# Patient Record
Sex: FEMALE | Race: WHITE | Employment: OTHER | URBAN - METROPOLITAN AREA
[De-identification: names, ages, dates, MRNs, and addresses within clinical notes are randomized per-mention and may not be internally consistent; named-entity substitution may affect disease eponyms.]

---

## 2017-01-13 ENCOUNTER — OFFICE VISIT (OUTPATIENT)
Dept: FAMILY MEDICINE | Facility: CLINIC | Age: 82
End: 2017-01-13
Payer: COMMERCIAL

## 2017-01-13 VITALS
TEMPERATURE: 97.3 F | HEART RATE: 84 BPM | BODY MASS INDEX: 27.66 KG/M2 | DIASTOLIC BLOOD PRESSURE: 46 MMHG | WEIGHT: 137 LBS | SYSTOLIC BLOOD PRESSURE: 106 MMHG

## 2017-01-13 DIAGNOSIS — N89.8 VAGINAL IRRITATION: ICD-10-CM

## 2017-01-13 DIAGNOSIS — R82.90 NONSPECIFIC FINDING ON EXAMINATION OF URINE: ICD-10-CM

## 2017-01-13 DIAGNOSIS — N30.01 ACUTE CYSTITIS WITH HEMATURIA: ICD-10-CM

## 2017-01-13 DIAGNOSIS — B37.31 CANDIDIASIS OF VAGINA: Primary | ICD-10-CM

## 2017-01-13 LAB
ALBUMIN UR-MCNC: 100 MG/DL
APPEARANCE UR: ABNORMAL
BACTERIA #/AREA URNS HPF: ABNORMAL /HPF
BILIRUB UR QL STRIP: ABNORMAL
COLOR UR AUTO: YELLOW
GLUCOSE UR STRIP-MCNC: NEGATIVE MG/DL
HGB UR QL STRIP: ABNORMAL
KETONES UR STRIP-MCNC: NEGATIVE MG/DL
LEUKOCYTE ESTERASE UR QL STRIP: ABNORMAL
MICRO REPORT STATUS: NORMAL
NITRATE UR QL: NEGATIVE
NON-SQ EPI CELLS #/AREA URNS LPF: ABNORMAL /LPF
PH UR STRIP: 5.5 PH (ref 5–7)
RBC #/AREA URNS AUTO: ABNORMAL /HPF (ref 0–2)
SP GR UR STRIP: >1.03 (ref 1–1.03)
SPECIMEN SOURCE: NORMAL
URN SPEC COLLECT METH UR: ABNORMAL
UROBILINOGEN UR STRIP-ACNC: 0.2 EU/DL (ref 0.2–1)
WBC #/AREA URNS AUTO: ABNORMAL /HPF (ref 0–2)
WET PREP SPEC: NORMAL

## 2017-01-13 PROCEDURE — 99213 OFFICE O/P EST LOW 20 MIN: CPT | Performed by: NURSE PRACTITIONER

## 2017-01-13 PROCEDURE — 81001 URINALYSIS AUTO W/SCOPE: CPT | Performed by: NURSE PRACTITIONER

## 2017-01-13 PROCEDURE — 87210 SMEAR WET MOUNT SALINE/INK: CPT | Performed by: NURSE PRACTITIONER

## 2017-01-13 PROCEDURE — 87088 URINE BACTERIA CULTURE: CPT | Performed by: NURSE PRACTITIONER

## 2017-01-13 PROCEDURE — 87086 URINE CULTURE/COLONY COUNT: CPT | Performed by: NURSE PRACTITIONER

## 2017-01-13 PROCEDURE — 87186 SC STD MICRODIL/AGAR DIL: CPT | Performed by: NURSE PRACTITIONER

## 2017-01-13 RX ORDER — NITROFURANTOIN 25; 75 MG/1; MG/1
100 CAPSULE ORAL 2 TIMES DAILY
Qty: 20 CAPSULE | Refills: 0 | Status: SHIPPED | OUTPATIENT
Start: 2017-01-13 | End: 2017-01-23

## 2017-01-13 RX ORDER — FLUCONAZOLE 150 MG/1
TABLET ORAL
Qty: 2 TABLET | Refills: 0 | Status: SHIPPED | OUTPATIENT
Start: 2017-01-13 | End: 2017-01-27

## 2017-01-13 ASSESSMENT — ENCOUNTER SYMPTOMS
NAUSEA: 0
FREQUENCY: 1
DYSURIA: 1
CHILLS: 0
SORE THROAT: 0
FLANK PAIN: 1
DIARRHEA: 0
CONSTIPATION: 0
FEVER: 0
EYE ITCHING: 0
COUGH: 0
WHEEZING: 0
SINUS PRESSURE: 0
FATIGUE: 0
EYE DISCHARGE: 0
RHINORRHEA: 0
SHORTNESS OF BREATH: 0
DIAPHORESIS: 0

## 2017-01-13 NOTE — PROGRESS NOTES
SUBJECTIVE:                                                    Kina Sears is a 89 year old female who presents to clinic today for the following health issues:      Has been having vaginal irritation for the last month. Lives in assisted living and has been putting some nystatin cream on the area and it is not getting better. Is incont of urine and changes pads at least once per day. Is having some lower back pain-more than usual. Has noticed that is going to the bathroom more frequently than usual. No chills or sweats.     Vaginal Symptoms      Duration: over one month    Description  itching, burning, odor and pelvic pain    Intensity:  severe    Accompanying signs and symptoms (fever/dysuria/abdominal or back pain): Pain, burning, difficult to sit down, urine is dark yellow    History  Sexually active: not at present  Possibility of pregnancy: No  Recent antibiotic use: no     Precipitating or alleviating factors: None    Therapies tried and outcome: Nystatin cream, drinking water  Outcome: not effective           Problem list and histories reviewed & adjusted, as indicated.  Additional history: as documented    Current Outpatient Prescriptions   Medication Sig Dispense Refill     Cholecalciferol (VITAMIN D PO)        fluconazole (DIFLUCAN) 150 MG tablet Take 1 tab by mouth today and then repeat in 4 days. 2 tablet 0     nitrofurantoin, macrocrystal-monohydrate, (MACROBID) 100 MG capsule Take 1 capsule (100 mg) by mouth 2 times daily for 10 days 20 capsule 0     lisinopril (PRINIVIL,ZESTRIL) 20 MG tablet Take 1 tablet (20 mg) by mouth daily 90 tablet 0     labetalol (NORMODYNE) 100 MG tablet TAKE ONE AND ONE-HALF TABLETS BY MOUTH TWICE DAILY 270 tablet 0     fluticasone (FLONASE) 50 MCG/ACT nasal spray Spray 2 sprays into both nostrils daily As needed for nasal congestion. 16 g 3     diclofenac (VOLTAREN) 1 % GEL Apply 4 grams to knees up to four times daily using enclosed dosing card as needed for pain 100  g 1     acetaminophen (TYLENOL) 325 MG tablet Take 650mg orally every morning and evening.  Make 650mg additionally as needed for pain every 4 hours, do no exceed 3000mg in 24 hour period 100 tablet 11     omeprazole (PRILOSEC) 20 MG capsule Take 1 capsule (20 mg) by mouth daily 90 capsule 1     order for DME Equipment being ordered: 4 wheeled walker 1 Device 0     acetaminophen 650 MG TABS Take 650 mg by mouth every 4 hours as needed. 250 tablet      clotrimazole (LOTRIMIN) 1 % cream Apply topically 2 times daily As needed under arm and under breasts 15 g 1     nitroglycerin (NITROSTAT) 0.4 MG SL tablet Place 1 tablet (0.4 mg) under the tongue See Admin Instructions EVERY 5 MIN AS NEEDED, UP TO 3 PER EPISODE 90 tablet 0     simvastatin (ZOCOR) 20 MG tablet Take 1 tablet (20 mg) by mouth At Bedtime 90 tablet 1     amLODIPine (NORVASC) 5 MG tablet TAKE ONE TABLET BY MOUTH TWICE A  tablet 1     Allergies   Allergen Reactions     Sulfa Drugs Rash       ROS:  Review of Systems   Constitutional: Negative for fever, chills, diaphoresis and fatigue.   HENT: Negative for ear discharge, ear pain, hearing loss, rhinorrhea, sinus pressure and sore throat.    Eyes: Negative for discharge and itching.   Respiratory: Negative for cough, shortness of breath and wheezing.    Gastrointestinal: Negative for nausea, diarrhea and constipation.   Genitourinary: Positive for dysuria, urgency, frequency, flank pain and enuresis.        Vaginal itching, hurts to sit.     Vaginal odor    Hurts to sit down in vaginal area                 OBJECTIVE:                                                    /46 mmHg  Pulse 84  Temp(Src) 97.3  F (36.3  C) (Tympanic)  Ht   Wt 137 lb (62.143 kg)  Breastfeeding? No  Body mass index is 27.66 kg/(m^2).  Physical Exam   Constitutional: She appears well-developed.   HENT:   Right Ear: Tympanic membrane and external ear normal.   Left Ear: Tympanic membrane and external ear normal.   Nose:  No mucosal edema or rhinorrhea.   Cardiovascular: Normal rate, regular rhythm and normal heart sounds.    Pulmonary/Chest: Effort normal and breath sounds normal.   Abdominal: Soft. Bowel sounds are normal.   Genitourinary:         Neurological: She is alert.   Skin: Skin is warm and dry.   Psychiatric: She has a normal mood and affect.         Diagnostic Test Results:  See Results from today     ASSESSMENT/PLAN:                                                        1. Vaginal irritation  Enc to change incont pads every other time that goes to the bathroom  Use a barrier cream like A&D or Desitin   - UA reflex to Microscopic and Culture  - Wet prep    2. Nonspecific finding on examination of urine  - Urine Culture Aerobic Bacterial    3. Candidiasis of vagina  Educated on use of med  May stop using nystatin cream   - Urine Microscopic  - fluconazole (DIFLUCAN) 150 MG tablet; Take 1 tab by mouth today and then repeat in 4 days.  Dispense: 2 tablet; Refill: 0    4. Acute cystitis with hematuria  Patient counseled regarding prevention of UTI's  Patient instructed to return for fever, vomiting, rash, flank/back pain or if symptoms not resolved in 2 days  Will send urine for C&S    - nitrofurantoin, macrocrystal-monohydrate, (MACROBID) 100 MG capsule; Take 1 capsule (100 mg) by mouth 2 times daily for 10 days  Dispense: 20 capsule; Refill: 0      ROBBIE Dvais [facility]

## 2017-01-13 NOTE — MR AVS SNAPSHOT
After Visit Summary   1/13/2017    Kina Sears    MRN: [id number]           Patient Information     Date Of Birth          6/9/1923        Visit Information        Provider Department      1/13/2017 10:40 AM Margaret Clement APRN [facility]        Today's Diagnoses     Candidiasis of vagina    -  1     Vaginal irritation         Nonspecific finding on examination of urine         Acute cystitis with hematuria           Care Instructions      Vaginal Infection: Yeast (Candidiasis)  Yeast infection occurs when yeast in the vagina increase and start attacking the vaginal tissues. Yeast is a type of fungus. These infections are often caused by a type of yeast called Candida albicans. Other species of yeast can also cause infections. Factors that may make infection more likely include recent antibiotic use, douching, or increased sex. Yeast infections are more common in women who have diabetes, or are obese or pregnant, or have a weak immune system.  Symptoms of yeast infection    Clumpy or thin, white discharge, which may look like cottage cheese    No odor or minimal odor    Severe vaginal itching or burning    Burning with urination    Swelling, redness of vulva    Pain during sex  Treating yeast infection  Yeast infection is treated with a vaginal antifungal cream. In some cases, antifungal pills are prescribed instead. During treatment:    Finish all of your medicine, even if your symptoms go away.    Apply the cream before going to bed. Lie flat after applying so that it doesn't drip out.    Do not douche or use tampons.    Don't rely on a diaphragm or condoms, since the cream may weaken them.    Avoid intercourse if advised by your healthcare provider.     Should I treat a yeast infection myself?  Discuss with your healthcare provider whether you should use over-the-counter medicines to treat a yeast infection. Self-treatment may depend on whether:    You've had a yeast  "infection in the past.    You're at risk for STDs.  Call your healthcare provider if symptoms do not go away or come back after treatment.     6252-8788 [employer]. [address] [place] [address], [place] [address]. All rights reserved. This information is not intended as a substitute for professional medical care. Always follow your healthcare professional's instructions.              Follow-ups after your visit        Who to contact     Normal or non-critical lab and imaging results will be communicated to you by [employer], letter or phone within 4 business days after the clinic has received the results. If you do not hear from us within 7 days, please contact the clinic through [employer] or phone. If you have a critical or abnormal lab result, we will notify you by phone as soon as possible.  Submit refill requests through [employer] or call your pharmacy and they will forward the refill request to us. Please allow 3 business days for your refill to be completed.          If you need to speak with a  for additional information , please call: [phone]           Additional Information About Your Visit        [employer] Information     [employer] lets you send messages to your doctor, view your test results, renew your prescriptions, schedule appointments and more. To sign up, go to [email or web address] . Click on \"Log in\" on the left side of the screen, which will take you to the Welcome page. Then click on \"Sign up Now\" on the right side of the page.     You will be asked to enter the access code listed below, as well as some personal information. Please follow the directions to create your username and password.     Your access code is: [email or web address]  Expires: 2017 11:21 AM     Your access code will  in 90 days. If you need help or a new code, please call your [place] clinic or [phone].        Care EveryWhere ID     This is your Care EveryWhere ID. This could be used by other " organizations to access your [facility] medical records  [id number]        Your Vitals Were     Pulse Temperature Breastfeeding?             84 97.3  F (36.3  C) (Tympanic) No          Blood Pressure from Last 3 Encounters:   01/13/17 106/46   03/22/16 125/65   02/26/16 132/69    Weight from Last 3 Encounters:   01/13/17 137 lb (62.143 kg)   03/22/16 131 lb (59.421 kg)   02/26/16 125 lb (56.7 kg)              We Performed the Following     UA reflex to Microscopic and Culture     Urine Culture Aerobic Bacterial     Urine Microscopic     Wet prep          Today's Medication Changes          These changes are accurate as of: 1/13/17 11:21 AM.  If you have any questions, ask your nurse or doctor.               Start taking these medicines.        Dose/Directions    fluconazole 150 MG tablet   Commonly known as:  DIFLUCAN   Used for:  Candidiasis of vagina   Started by:  Margaret Clement APRN CNP        Take 1 tab by mouth today and then repeat in 4 days.   Quantity:  2 tablet   Refills:  0       nitrofurantoin (macrocrystal-monohydrate) 100 MG capsule   Commonly known as:  MACROBID   Used for:  Acute cystitis with hematuria   Started by:  Margaret Clement APRN CNP        Dose:  100 mg   Take 1 capsule (100 mg) by mouth 2 times daily for 10 days   Quantity:  20 capsule   Refills:  0            Where to get your medicines      These medications [place] [facility] [place] [id number]  [address] [place] [id number]     Phone:  [phone]    - fluconazole 150 MG tablet  - nitrofurantoin (macrocrystal-monohydrate) 100 MG capsule             Primary Care Provider Office Phone # Fax #    Melisa Butcher [phone]       [address] [place] [facility] [address]        Thank you!     Thank you for choosing [facility]  for your care. Our goal is always to provide you with excellent care. Hearing back from our  patients is one way we can continue to improve our services. Please take a few minutes to complete the written survey that you may receive in the mail after your visit with us. Thank you!             Your Updated Medication List - Protect others around you: Learn how to safely use, store and throw away your medicines at www.disposemymeds.org.          This list is accurate as of: 1/13/17 11:21 AM.  Always use your most recent med list.                   Brand Name Dispense Instructions for use    * ACETAMINOPHEN 8 HOUR 650 MG 8 hour tablet   Generic drug:  acetaminophen     250 tablet    Take 650 mg by mouth every 4 hours as needed.       * acetaminophen 325 MG tablet    TYLENOL    100 tablet    Take 650mg orally every morning and evening.  Make 650mg additionally as needed for pain every 4 hours, do no exceed 3000mg in 24 hour period       amLODIPine 5 MG tablet    NORVASC    180 tablet    TAKE ONE TABLET BY MOUTH TWICE A DAY       clotrimazole 1 % cream    LOTRIMIN    15 g    Apply topically 2 times daily As needed under arm and under breasts       diclofenac 1 % Gel topical gel    VOLTAREN    100 g    Apply 4 grams to knees up to four times daily using enclosed dosing card as needed for pain       fluconazole 150 MG tablet    DIFLUCAN    2 tablet    Take 1 tab by mouth today and then repeat in 4 days.       fluticasone 50 MCG/ACT spray    FLONASE    16 g    Spray 2 sprays into both nostrils daily As needed for nasal congestion.       labetalol 100 MG tablet    NORMODYNE    270 tablet    TAKE ONE AND ONE-HALF TABLETS BY MOUTH TWICE DAILY       lisinopril 20 MG tablet    PRINIVIL/ZESTRIL    90 tablet    Take 1 tablet (20 mg) by mouth daily       nitrofurantoin (macrocrystal-monohydrate) 100 MG capsule    MACROBID    20 capsule    Take 1 capsule (100 mg) by mouth 2 times daily for 10 days       nitroglycerin 0.4 MG sublingual tablet    NITROSTAT    90 tablet    Place 1 tablet (0.4 mg) under the tongue See Admin  Instructions EVERY 5 MIN AS NEEDED, UP TO 3 PER EPISODE       omeprazole 20 MG CR capsule    priLOSEC    90 capsule    Take 1 capsule (20 mg) by mouth daily       order for DME     1 Device    Equipment being ordered: 4 wheeled walker       simvastatin 20 MG tablet    ZOCOR    90 tablet    Take 1 tablet (20 mg) by mouth At Bedtime       VITAMIN D PO          * Notice:  This list has 2 medication(s) that are the same as other medications prescribed for you. Read the directions carefully, and ask your doctor or other care provider to review them with you.

## 2017-01-13 NOTE — PATIENT INSTRUCTIONS
Vaginal Infection: Yeast (Candidiasis)  Yeast infection occurs when yeast in the vagina increase and start attacking the vaginal tissues. Yeast is a type of fungus. These infections are often caused by a type of yeast called Candida albicans. Other species of yeast can also cause infections. Factors that may make infection more likely include recent antibiotic use, douching, or increased sex. Yeast infections are more common in women who have diabetes, or are obese or pregnant, or have a weak immune system.  Symptoms of yeast infection    Clumpy or thin, white discharge, which may look like cottage cheese    No odor or minimal odor    Severe vaginal itching or burning    Burning with urination    Swelling, redness of vulva    Pain during sex  Treating yeast infection  Yeast infection is treated with a vaginal antifungal cream. In some cases, antifungal pills are prescribed instead. During treatment:    Finish all of your medicine, even if your symptoms go away.    Apply the cream before going to bed. Lie flat after applying so that it doesn't drip out.    Do not douche or use tampons.    Don't rely on a diaphragm or condoms, since the cream may weaken them.    Avoid intercourse if advised by your healthcare provider.     Should I treat a yeast infection myself?  Discuss with your healthcare provider whether you should use over-the-counter medicines to treat a yeast infection. Self-treatment may depend on whether:    You've had a yeast infection in the past.    You're at risk for STDs.  Call your healthcare provider if symptoms do not go away or come back after treatment.     1746-5594 [employer]. [address] [place] [address], [place] [address]. All rights reserved. This information is not intended as a substitute for professional medical care. Always follow your healthcare professional's instructions.

## 2017-01-13 NOTE — NURSING NOTE
"Chief Complaint   Patient presents with     Vaginal Problem       Initial /46 mmHg  Pulse 84  Temp(Src) 97.3  F (36.3  C) (Tympanic)  Ht   Wt 137 lb (62.143 kg)  Breastfeeding? No Estimated body mass index is 27.66 kg/(m^2) as calculated from the following:    Height as of 2/11/16: 4' 11\" (1.499 m).    Weight as of this encounter: 137 lb (62.143 kg).  BP completed using cuff size: regular    April SONU Holder      "

## 2017-01-16 LAB
BACTERIA SPEC CULT: ABNORMAL
MICRO REPORT STATUS: ABNORMAL
MICROORGANISM SPEC CULT: ABNORMAL
MICROORGANISM SPEC CULT: ABNORMAL
SPECIMEN SOURCE: ABNORMAL

## 2017-01-16 NOTE — PROGRESS NOTES
Quick Note:    Your urine culture is positive. You are on the right antibiotic. Make sure to take the full course of the antibiotic. Please return 3 days after your antibiotic is finished to resubmit a urine sample to make sure that infection is gone.  ______

## 2017-01-18 ENCOUNTER — TELEPHONE (OUTPATIENT)
Dept: FAMILY MEDICINE | Facility: CLINIC | Age: 82
End: 2017-01-18

## 2017-01-18 DIAGNOSIS — N39.0 UTI (URINARY TRACT INFECTION): Primary | ICD-10-CM

## 2017-01-23 ENCOUNTER — TELEPHONE (OUTPATIENT)
Dept: FAMILY MEDICINE | Facility: CLINIC | Age: 82
End: 2017-01-23

## 2017-01-23 ENCOUNTER — COMMUNICATION - HEALTHEAST (OUTPATIENT)
Dept: ADMINISTRATIVE | Facility: CLINIC | Age: 82
End: 2017-01-23

## 2017-01-23 NOTE — TELEPHONE ENCOUNTER
Patient's son called back. He is leaving town as soon as he hears back from us. He wants to know if patient should still be taking the macrobid and what time of day should she be taking her metoprolol.  She is currently taking:  Metoprolol 25 mg daily  Lasix 20 mg BID  K 10 mEq daily  Vit D 2000 iu daily    She is not taking simvastatin or amlodipine.  Please review current medications and address whether she should still be taking the macrobid and when she should be taking the metoprolol. Provider's CMA notified. Thank you.  Kathi Montenegro RN

## 2017-01-23 NOTE — TELEPHONE ENCOUNTER
Patient son called and would like to talk to the nurse of Dr. Butcher about some medications that his mom was prescribed when she was in with [facility].  Son says its important to call right away.    Mila Ott [facility]

## 2017-01-23 NOTE — TELEPHONE ENCOUNTER
Advised to finish the macrobid, take until gone.     Follow up this Friday to address metoprolol and lasix.

## 2017-01-23 NOTE — TELEPHONE ENCOUNTER
Son reports that patient was seen on 1/13/17 for UTI. She was treated with macrobid 100 mg BID for 10 days. She took medication on Fri, Sat, Sun, and Mon.   Patient was admitted to [facility] for difficulty with breathing due to another issue separate from UTI. She did not receive her macrobid on Tues, Wed, Thurs, or Friday even though son had told staff about the macrobid. When patient was discharged on Saturday, patient's son started the macrobid again. She received dosing on Sat, Sun, and today. Son reports that patient is feeling much better, no UTI symptoms now. Son is wondering if she should continue with the macrobid or not.  Patient was also taken off of labetolol and prescribed metoprolol when she was in the hospital. He did not remember the dosing. He is going to her assisted living facility and will confirm what medications and dosing she is on.  Please advise in Dr. Butcher's absence recommendations about her macrobid. Thank you.  Kathi Montenegro RN

## 2017-01-27 ENCOUNTER — OFFICE VISIT (OUTPATIENT)
Dept: FAMILY MEDICINE | Facility: CLINIC | Age: 82
End: 2017-01-27
Payer: COMMERCIAL

## 2017-01-27 VITALS
HEART RATE: 92 BPM | SYSTOLIC BLOOD PRESSURE: 112 MMHG | OXYGEN SATURATION: 97 % | DIASTOLIC BLOOD PRESSURE: 60 MMHG | TEMPERATURE: 97.7 F

## 2017-01-27 DIAGNOSIS — N30.01 ACUTE CYSTITIS WITH HEMATURIA: ICD-10-CM

## 2017-01-27 DIAGNOSIS — R82.90 NONSPECIFIC FINDING ON EXAMINATION OF URINE: ICD-10-CM

## 2017-01-27 DIAGNOSIS — B37.9 CANDIDA INFECTION: ICD-10-CM

## 2017-01-27 DIAGNOSIS — R30.0 DYSURIA: Primary | ICD-10-CM

## 2017-01-27 DIAGNOSIS — I50.43 ACUTE ON CHRONIC COMBINED SYSTOLIC AND DIASTOLIC CONGESTIVE HEART FAILURE (H): ICD-10-CM

## 2017-01-27 LAB
ALBUMIN SERPL-MCNC: 3.6 G/DL (ref 3.4–5)
ALBUMIN UR-MCNC: NEGATIVE MG/DL
ALP SERPL-CCNC: 67 U/L (ref 40–150)
ALT SERPL W P-5'-P-CCNC: 21 U/L (ref 0–50)
ANION GAP SERPL CALCULATED.3IONS-SCNC: 9 MMOL/L (ref 3–14)
APPEARANCE UR: ABNORMAL
AST SERPL W P-5'-P-CCNC: 19 U/L (ref 0–45)
BACTERIA #/AREA URNS HPF: ABNORMAL /HPF
BASOPHILS # BLD AUTO: 0.1 10E9/L (ref 0–0.2)
BASOPHILS NFR BLD AUTO: 1 %
BILIRUB SERPL-MCNC: 0.3 MG/DL (ref 0.2–1.3)
BILIRUB UR QL STRIP: NEGATIVE
BUN SERPL-MCNC: 37 MG/DL (ref 7–30)
CALCIUM SERPL-MCNC: 9 MG/DL (ref 8.5–10.1)
CHLORIDE SERPL-SCNC: 107 MMOL/L (ref 94–109)
CO2 SERPL-SCNC: 24 MMOL/L (ref 20–32)
COLOR UR AUTO: YELLOW
CREAT SERPL-MCNC: 1.33 MG/DL (ref 0.52–1.04)
DIFFERENTIAL METHOD BLD: ABNORMAL
EOSINOPHIL # BLD AUTO: 0.5 10E9/L (ref 0–0.7)
EOSINOPHIL NFR BLD AUTO: 7 %
ERYTHROCYTE [DISTWIDTH] IN BLOOD BY AUTOMATED COUNT: 22.4 % (ref 10–15)
GFR SERPL CREATININE-BSD FRML MDRD: 37 ML/MIN/1.7M2
GLUCOSE SERPL-MCNC: 123 MG/DL (ref 70–99)
GLUCOSE UR STRIP-MCNC: NEGATIVE MG/DL
HCT VFR BLD AUTO: 33.6 % (ref 35–47)
HGB BLD-MCNC: 9.9 G/DL (ref 11.7–15.7)
HGB UR QL STRIP: ABNORMAL
HYALINE CASTS #/AREA URNS LPF: ABNORMAL /LPF (ref 0–2)
KETONES UR STRIP-MCNC: NEGATIVE MG/DL
LEUKOCYTE ESTERASE UR QL STRIP: ABNORMAL
LYMPHOCYTES # BLD AUTO: 1.4 10E9/L (ref 0.8–5.3)
LYMPHOCYTES NFR BLD AUTO: 19.3 %
MCH RBC QN AUTO: 23.1 PG (ref 26.5–33)
MCHC RBC AUTO-ENTMCNC: 29.5 G/DL (ref 31.5–36.5)
MCV RBC AUTO: 79 FL (ref 78–100)
MONOCYTES # BLD AUTO: 0.8 10E9/L (ref 0–1.3)
MONOCYTES NFR BLD AUTO: 10.5 %
NEUTROPHILS # BLD AUTO: 4.6 10E9/L (ref 1.6–8.3)
NEUTROPHILS NFR BLD AUTO: 62.2 %
NITRATE UR QL: NEGATIVE
NON-SQ EPI CELLS #/AREA URNS LPF: ABNORMAL /LPF
NT-PROBNP SERPL-MCNC: 1909 PG/ML (ref 0–450)
PH UR STRIP: 5 PH (ref 5–7)
PLATELET # BLD AUTO: 338 10E9/L (ref 150–450)
POTASSIUM SERPL-SCNC: 5.5 MMOL/L (ref 3.4–5.3)
PROT SERPL-MCNC: 7 G/DL (ref 6.8–8.8)
RBC # BLD AUTO: 4.28 10E12/L (ref 3.8–5.2)
RBC #/AREA URNS AUTO: ABNORMAL /HPF (ref 0–2)
SODIUM SERPL-SCNC: 140 MMOL/L (ref 133–144)
SP GR UR STRIP: 1.01 (ref 1–1.03)
URN SPEC COLLECT METH UR: ABNORMAL
UROBILINOGEN UR STRIP-ACNC: 0.2 EU/DL (ref 0.2–1)
WBC # BLD AUTO: 7.3 10E9/L (ref 4–11)
WBC #/AREA URNS AUTO: ABNORMAL /HPF (ref 0–2)

## 2017-01-27 PROCEDURE — 85025 COMPLETE CBC W/AUTO DIFF WBC: CPT | Performed by: NURSE PRACTITIONER

## 2017-01-27 PROCEDURE — 99214 OFFICE O/P EST MOD 30 MIN: CPT | Performed by: NURSE PRACTITIONER

## 2017-01-27 PROCEDURE — 87086 URINE CULTURE/COLONY COUNT: CPT | Performed by: NURSE PRACTITIONER

## 2017-01-27 PROCEDURE — 36415 COLL VENOUS BLD VENIPUNCTURE: CPT | Performed by: NURSE PRACTITIONER

## 2017-01-27 PROCEDURE — 83880 ASSAY OF NATRIURETIC PEPTIDE: CPT | Performed by: NURSE PRACTITIONER

## 2017-01-27 PROCEDURE — 81001 URINALYSIS AUTO W/SCOPE: CPT | Performed by: NURSE PRACTITIONER

## 2017-01-27 PROCEDURE — 80053 COMPREHEN METABOLIC PANEL: CPT | Performed by: NURSE PRACTITIONER

## 2017-01-27 PROCEDURE — 87088 URINE BACTERIA CULTURE: CPT | Performed by: NURSE PRACTITIONER

## 2017-01-27 PROCEDURE — 87186 SC STD MICRODIL/AGAR DIL: CPT | Performed by: NURSE PRACTITIONER

## 2017-01-27 RX ORDER — MULTIVIT-MIN/IRON/FOLIC ACID/K 18-600-40
CAPSULE ORAL
COMMUNITY
End: 2018-01-01

## 2017-01-27 RX ORDER — CIPROFLOXACIN 500 MG/1
500 TABLET, FILM COATED ORAL 2 TIMES DAILY
Qty: 14 TABLET | Refills: 0 | Status: SHIPPED | OUTPATIENT
Start: 2017-01-27 | End: 2018-01-01

## 2017-01-27 RX ORDER — FUROSEMIDE 40 MG
40 TABLET ORAL DAILY
Qty: 90 TABLET | Refills: 3 | Status: SHIPPED | OUTPATIENT
Start: 2017-01-27 | End: 2018-01-01

## 2017-01-27 RX ORDER — NYSTATIN 100000 U/G
CREAM TOPICAL 2 TIMES DAILY
COMMUNITY
End: 2018-01-01

## 2017-01-27 RX ORDER — METOPROLOL SUCCINATE 25 MG/1
25 TABLET, EXTENDED RELEASE ORAL DAILY
Qty: 90 TABLET | Refills: 3 | Status: SHIPPED | OUTPATIENT
Start: 2017-01-27 | End: 2018-01-01

## 2017-01-27 ASSESSMENT — ENCOUNTER SYMPTOMS
ABDOMINAL PAIN: 0
NUMBNESS: 0
VOMITING: 0
DYSURIA: 1
SORE THROAT: 0
DIARRHEA: 1
COUGH: 0
NAUSEA: 0
CHEST TIGHTNESS: 0
DIZZINESS: 0
LIGHT-HEADEDNESS: 0
FATIGUE: 0
FREQUENCY: 1
POLYDIPSIA: 0
ABDOMINAL DISTENTION: 0
SHORTNESS OF BREATH: 0
RHINORRHEA: 0
HEADACHES: 0
CONSTIPATION: 0
ARTHRALGIAS: 0
PALPITATIONS: 0
POLYPHAGIA: 0
WHEEZING: 0

## 2017-01-27 NOTE — Clinical Note
[address]  [address]  [phone]      January 30, 2017      Kina Sears  [address]  [address]  [facility] [address]              Dear Ms. Sears,    Your urine culture is positive. You are on the right antibiotic. Make sure to take the full course of the antibiotic. Please return 3 days after your antibiotic is finished to resubmit a urine sample to make sure that infection is gone. Hope the diarrhea is better!    Please call or e-mail with any additional questions or concerns.       Sincerely,    Margaret Clement, ROBBIE-CNP

## 2017-01-27 NOTE — NURSING NOTE
"Chief Complaint   Patient presents with     Urinary Problem       Initial /60 mmHg  Pulse 92  Temp(Src) 97.7  F (36.5  C) (Tympanic)  SpO2 97% Estimated body mass index is 27.66 kg/(m^2) as calculated from the following:    Height as of 2/11/16: 4' 11\" (1.499 m).    Weight as of 1/13/17: 137 lb (62.143 kg).  BP completed using cuff size: regular    April SONU Holder      "

## 2017-01-27 NOTE — PATIENT INSTRUCTIONS
"   * BLADDER INFECTION,Female (Adult)    A bladder infection (\"cystitis\" or \"UTI\") usually causes a constant urge to urinate and a burning when passing urine. Urine may be cloudy, smelly or dark. There may be pain in the lower abdomen. A bladder infection occurs when bacteria from the vaginal area enter the bladder opening (urethra). This can occur from sexual intercourse, wearing tight clothing, dehydration and other factors.  HOME CARE:  1. Drink lots of fluids (at least 6-8 glasses a day, unless you must restrict fluids for other medical reasons). This will force the medicine into your urinary system and flush the bacteria out of your body. Cranberry juice has been shown to help clear out the bacteria.  2. Avoid sexual intercourse until your symptoms are gone.  3. A bladder infection is treated with antibiotics. You may also be given Pyridium (generic = phenazopyridine) to reduce the burning sensation. This medicine will cause your urine to become a bright orange color. The orange urine may stain clothing. You may wear a pad or panty-liner to protect clothing.  PREVENTING FUTURE INFECTIONS:  1. Always wipe from front to back after a bowel movement.  2. Keep the genital area clean and dry.  3. Drink plenty of fluids each day to avoid dehydration.  4. Urinate right after intercourse to flush out the bladder.  5. Wear cotton underwear and cotton-lined panty hose; avoid tight-fitting pants.  6. If you are on birth control pills and are having frequent bladder infections, discuss with your doctor.  FOLLOW UP: Return to this facility or see your doctor if ALL symptoms are not gone after three days of treatment.  GET PROMPT MEDICAL ATTENTION if any of the following occur:    Fever over 101 F (38.3 C)    No improvement by the third day of treatment    Increasing back or abdominal pain    Repeated vomiting; unable to keep medicine down    Weakness, dizziness or fainting    Vaginal discharge    Pain, redness or swelling in " the labia (outer vaginal area)    4959-5494 [employer], [address] [place] [address], [place] [address]. All rights reserved. This information is not intended as a substitute for professional medical care. Always follow your healthcare professional's instructions.

## 2017-01-27 NOTE — MR AVS SNAPSHOT
"              After Visit Summary   1/27/2017    Kina Sears    MRN: [id number]           Patient Information     Date Of Birth          6/9/1923        Visit Information        Provider Department      1/27/2017 10:40 AM Margaret Clement APRN [facility]        Today's Diagnoses     Dysuria    -  1     Nonspecific finding on examination of urine         Acute on chronic combined systolic and diastolic congestive heart failure (H)         Acute cystitis with hematuria         Candida infection           Care Instructions       * BLADDER INFECTION,Female (Adult)    A bladder infection (\"cystitis\" or \"UTI\") usually causes a constant urge to urinate and a burning when passing urine. Urine may be cloudy, smelly or dark. There may be pain in the lower abdomen. A bladder infection occurs when bacteria from the vaginal area enter the bladder opening (urethra). This can occur from sexual intercourse, wearing tight clothing, dehydration and other factors.  HOME CARE:  1. Drink lots of fluids (at least 6-8 glasses a day, unless you must restrict fluids for other medical reasons). This will force the medicine into your urinary system and flush the bacteria out of your body. Cranberry juice has been shown to help clear out the bacteria.  2. Avoid sexual intercourse until your symptoms are gone.  3. A bladder infection is treated with antibiotics. You may also be given Pyridium (generic = phenazopyridine) to reduce the burning sensation. This medicine will cause your urine to become a bright orange color. The orange urine may stain clothing. You may wear a pad or panty-liner to protect clothing.  PREVENTING FUTURE INFECTIONS:  1. Always wipe from front to back after a bowel movement.  2. Keep the genital area clean and dry.  3. Drink plenty of fluids each day to avoid dehydration.  4. Urinate right after intercourse to flush out the bladder.  5. Wear cotton underwear and cotton-lined panty hose; " "avoid tight-fitting pants.  6. If you are on birth control pills and are having frequent bladder infections, discuss with your doctor.  FOLLOW UP: Return to this facility or see your doctor if ALL symptoms are not gone after three days of treatment.  GET PROMPT MEDICAL ATTENTION if any of the following occur:    Fever over 101 F (38.3 C)    No improvement by the third day of treatment    Increasing back or abdominal pain    Repeated vomiting; unable to keep medicine down    Weakness, dizziness or fainting    Vaginal discharge    Pain, redness or swelling in the labia (outer vaginal area)    5762-9374 [employer], [address] [place] [address]. All rights reserved. This information is not intended as a substitute for professional medical care. Always follow your healthcare professional's instructions.          Follow-ups after your visit        Who to contact     Normal or non-critical lab and imaging results will be communicated to you by [email or web address], letter or phone within 4 business days after the clinic has received the results. If you do not hear from us within 7 days, please contact the clinic through [email or web address] or phone. If you have a critical or abnormal lab result, we will notify you by phone as soon as possible.  Submit refill requests through Our[employer] or call your pharmacy and they will forward the refill request to us. Please allow 3 business days for your refill to be completed.          If you need to speak with a  for additional information , please call: [phone]           Additional Information About Your Visit        OurHealthMate Information     Our[employer]ate lets you send messages to your doctor, view your test results, renew your prescriptions, schedule appointments and more. To sign up, go to [email or web address] . Click on \"Log in\" on the left side of the screen, which will take you to the Welcome page. Then click on \"Sign up Now\" on the right side of the page.     You " will be asked to enter the access code listed below, as well as some personal information. Please follow the directions to create your username and password.     Your access code is: [email or web address]  Expires: 2017 11:21 AM     Your access code will  in 90 days. If you need help or a new code, please call your [facility] clinic or [phone].        Care EveryWhere ID     This is your Care EveryWhere ID. This could be used by other organizations to access your [facility] medical records  [id number]        Your Vitals Were     Pulse Temperature Pulse Oximetry             92 97.7  F (36.5  C) (Tympanic) 97%          Blood Pressure from Last 3 Encounters:   17 84/40   17 106/46   16 125/65    Weight from Last 3 Encounters:   17 137 lb (62.143 kg)   16 131 lb (59.421 kg)   16 125 lb (56.7 kg)              We Performed the Following     CBC with platelets differential     Comprehensive metabolic panel     N terminal pro BNP outpatient     UA reflex to Microscopic and Culture     Urine Culture Aerobic Bacterial     Urine Microscopic          Today's Medication Changes          These changes are accurate as of: 17 11:38 AM.  If you have any questions, ask your nurse or doctor.               Start taking these medicines.        Dose/Directions    ciprofloxacin 500 MG tablet   Commonly known as:  CIPRO   Used for:  Acute cystitis with hematuria   Started by:  Margaret Clement APRN CNP        Dose:  500 mg   Take 1 tablet (500 mg) by mouth 2 times daily   Quantity:  14 tablet   Refills:  0       dimethicone-zinc oxide cream   Used for:  Candida infection   Started by:  Margaret Clement APRN CNP        Apply three per day until area is cleared   Quantity:  142 g   Refills:  1         These medicines have changed or have updated prescriptions.        Dose/Directions    acetaminophen 325 MG tablet   Commonly known as:  TYLENOL   This may have changed:  Another  medication with the same name was removed. Continue taking this medication, and follow the directions you see here.   Used for:  Primary osteoarthritis of right knee   Changed by:  Melisa Butcher, DO        Take 650mg orally every morning and evening.  Make 650mg additionally as needed for pain every 4 hours, do no exceed 3000mg in 24 hour period   Quantity:  100 tablet   Refills:  11       furosemide 40 MG tablet   Commonly known as:  LASIX   This may have changed:    - medication strength  - how much to take  - when to take this   Used for:  Acute on chronic combined systolic and diastolic congestive heart failure (H)   Changed by:  Margaret Clement APRN CNP        Dose:  40 mg   Take 1 tablet (40 mg) by mouth daily   Quantity:  90 tablet   Refills:  3       metoprolol 25 MG 24 hr tablet   Commonly known as:  TOPROL-XL   This may have changed:    - medication strength  - when to take this   Used for:  Acute on chronic combined systolic and diastolic congestive heart failure (H)   Changed by:  Margaret Clement APRN CNP        Dose:  25 mg   Take 1 tablet (25 mg) by mouth daily   Quantity:  90 tablet   Refills:  3       vitamin D 2000 UNITS Caps   This may have changed:  Another medication with the same name was removed. Continue taking this medication, and follow the directions you see here.   Changed by:  Margaret Clement APRN CNP        Refills:  0         Stop taking these medicines if you haven't already. Please contact your care team if you have questions.     amLODIPine 5 MG tablet   Commonly known as:  NORVASC   Stopped by:  Margaret Clement APRN CNP           labetalol 100 MG tablet   Commonly known as:  NORMODYNE   Stopped by:  Margaret Clement APRN CNP                Where to get your medicines      These medications were sent to [facility] [address] [facility]  [address] [facility], [facility] [address]     Phone:  [phone] -  ciprofloxacin 500 MG tablet  - dimethicone-zinc oxide cream      These medications were sent to [facility] - [place], [place] - [address] [facility]  [address] [facility] [place], [place] [address]     Phone:  [phone]    - furosemide 40 MG tablet  - metoprolol 25 MG 24 hr tablet             Primary Care Provider Office Phone # Fax #    Melisa Butcher [phone]       [facility] [address] [place] [address] [address] [place] [address]        Thank you!     Thank you for choosing [facility]  for your care. Our goal is always to provide you with excellent care. Hearing back from our patients is one way we can continue to improve our services. Please take a few minutes to complete the written survey that you may receive in the mail after your visit with us. Thank you!             Your Updated Medication List - Protect others around you: Learn how to safely use, store and throw away your medicines at www.disposemymeds.org.          This list is accurate as of: 1/27/17 11:38 AM.  Always use your most recent med list.                   Brand Name Dispense Instructions for use    acetaminophen 325 MG tablet    TYLENOL    100 tablet    Take 650mg orally every morning and evening.  Make 650mg additionally as needed for pain every 4 hours, do no exceed 3000mg in 24 hour period       ciprofloxacin 500 MG tablet    CIPRO    14 tablet    Take 1 tablet (500 mg) by mouth 2 times daily       clotrimazole 1 % cream    LOTRIMIN    15 g    Apply topically 2 times daily As needed under arm and under breasts       diclofenac 1 % Gel topical gel    VOLTAREN    100 g    Apply 4 grams to knees up to four times daily using enclosed dosing card as needed for pain       dimethicone-zinc oxide cream     142 g    Apply three per day until area is cleared       fluticasone 50 MCG/ACT spray    FLONASE    16 g    Spray 2 sprays into both nostrils daily As needed for nasal congestion.        furosemide 40 MG tablet    LASIX    90 tablet    Take 1 tablet (40 mg) by mouth daily       lisinopril 20 MG tablet    PRINIVIL/ZESTRIL    90 tablet    Take 1 tablet (20 mg) by mouth daily       metoprolol 25 MG 24 hr tablet    TOPROL-XL    90 tablet    Take 1 tablet (25 mg) by mouth daily       nitroglycerin 0.4 MG sublingual tablet    NITROSTAT    90 tablet    Place 1 tablet (0.4 mg) under the tongue See Admin Instructions EVERY 5 MIN AS NEEDED, UP TO 3 PER EPISODE       nystatin cream    MYCOSTATIN     Apply topically 2 times daily       omeprazole 20 MG CR capsule    priLOSEC    90 capsule    Take 1 capsule (20 mg) by mouth daily       order for DME     1 Device    Equipment being ordered: 4 wheeled walker       POTASSIUM CHLORIDE PO      Take 10 mEq by mouth       TYLENOL PM EXTRA STRENGTH PO          vitamin D 2000 UNITS Caps

## 2017-01-27 NOTE — Clinical Note
[address]  [address] [phone]      January 27, 2017      Kian Orion  [address]  [address]  [facility] [address]              Dear Kina,     The blood test that checks for fluid is still very elevated, so it is important that you continue to take your furosemide. Your potassium is a bit elevated. Please stop taking the potassium replacement. Your kidney function is also a bit more elevated since increasing your lasix dose. Please try and make sure that you are drinking plenty of water. Should plan to recheck your BMP and BNP in 2 weeks.     Please call or email with any additional questions or concerns.    You may want to consider signing up for [employer], which is a way to access your results online and review your records. The most useful aspect of this for you and your family is that you could ask questions and send messages online through a secure email site. For information regarding this, as well as a printable authorization form, go to [email or web address]            Sincerely,    ROBBIE Solano/antonio                        Results for orders placed or performed in visit on 01/27/17   UA reflex to Microscopic and Culture   Result Value Ref Range    Color Urine Yellow     Appearance Urine Slightly Cloudy     Glucose Urine Negative NEG mg/dL    Bilirubin Urine Negative NEG    Ketones Urine Negative NEG mg/dL    Specific Gravity Urine 1.015 1.003 - 1.035    Blood Urine Small (A) NEG    pH Urine 5.0 5.0 - 7.0 pH    Protein Albumin Urine Negative NEG mg/dL    Urobilinogen Urine 0.2 0.2 - 1.0 EU/dL    Nitrite Urine Negative NEG    Leukocyte Esterase Urine Large (A) NEG    Source Midstream Urine    Urine Microscopic   Result Value Ref Range    WBC Urine 10-25 (A) 0 - 2 /HPF    RBC Urine O - 2 0 - 2 /HPF    Hyaline Casts 2-5 (A) 0 - 2 /LPF    Squamous Epithelial /LPF Urine Moderate (A) FEW /LPF    Bacteria Urine Many (A) NEG /HPF   CBC with  platelets differential   Result Value Ref Range    WBC 7.3 4.0 - 11.0 10e9/L    RBC Count 4.28 3.8 - 5.2 10e12/L    Hemoglobin 9.9 (L) 11.7 - 15.7 g/dL    Hematocrit 33.6 (L) 35.0 - 47.0 %    MCV 79 78 - 100 fl    MCH 23.1 (L) 26.5 - 33.0 pg    MCHC 29.5 (L) 31.5 - 36.5 g/dL    RDW 22.4 (H) 10.0 - 15.0 %    Platelet Count 338 150 - 450 10e9/L    Diff Method Automated Method     % Neutrophils 62.2 %    % Lymphocytes 19.3 %    % Monocytes 10.5 %    % Eosinophils 7.0 %    % Basophils 1.0 %    Absolute Neutrophil 4.6 1.6 - 8.3 10e9/L    Absolute Lymphocytes 1.4 0.8 - 5.3 10e9/L    Absolute Monocytes 0.8 0.0 - 1.3 10e9/L    Absolute Eosinophils 0.5 0.0 - 0.7 10e9/L    Absolute Basophils 0.1 0.0 - 0.2 10e9/L   Comprehensive metabolic panel   Result Value Ref Range    Sodium 140 133 - 144 mmol/L    Potassium 5.5 (H) 3.4 - 5.3 mmol/L    Chloride 107 94 - 109 mmol/L    Carbon Dioxide 24 20 - 32 mmol/L    Anion Gap 9 3 - 14 mmol/L    Glucose 123 (H) 70 - 99 mg/dL    Urea Nitrogen 37 (H) 7 - 30 mg/dL    Creatinine 1.33 (H) 0.52 - 1.04 mg/dL    GFR Estimate 37 (L) >60 mL/min/1.7m2    GFR Estimate If Black 45 (L) >60 mL/min/1.7m2    Calcium 9.0 8.5 - 10.1 mg/dL    Bilirubin Total 0.3 0.2 - 1.3 mg/dL    Albumin 3.6 3.4 - 5.0 g/dL    Protein Total 7.0 6.8 - 8.8 g/dL    Alkaline Phosphatase 67 40 - 150 U/L    ALT 21 0 - 50 U/L    AST 19 0 - 45 U/L   N terminal pro BNP outpatient   Result Value Ref Range    N-Terminal Pro Bnp 1909 (H) 0 - 450 pg/mL

## 2017-01-27 NOTE — Clinical Note
[facility]  [address] [place]  [address]  [phone]      January 27, 2017      Kina Sears  [address]  [address]  [facility] [address]              Kina,       Your anemia is improving since you started taking the iron pill, please continue to take it.     Discussed results of urine test in office, please take antibiotic as prescribed.     Please call or email with any additional questions or concerns            Sincerely,    ROBBIE Solano                  Ref Range 1/27/17 11:39 AM   Flag   WBC 4.0 - 11.0 10e9/L 7.3    RBC Count 3.8 - 5.2 10e12/L 4.28    Hemoglobin 11.7 - 15.7 g/dL 9.9 L   Hematocrit 35.0 - 47.0 % 33.6 L   MCV 78 - 100 fl 79    MCH 26.5 - 33.0 pg 23.1 L   MCHC 31.5 - 36.5 g/dL 29.5 L   RDW 10.0 - 15.0 % 22.4 H   Platelet Count 150 - 450 10e9/L 338    Diff Method  Automated Method    % Neutrophils % 62.2    % Lymphocytes % 19.3    % Monocytes % 10.5    % Eosinophils % 7.0    % Basophils % 1.0    Absolute Neutrophil 1.6 - 8.3 10e9/L 4.6    Absolute Lymphocytes 0.8 - 5.3 10e9/L 1.4    Absolute Monocytes 0.0 - 1.3 10e9/L 0.8    Absolute Eosinophils 0.0 - 0.7 10e9/L 0.5    Absolute Basophils 0.0 - 0.2 10e9/L 0.1    Resulting Agency [facility]        Specimen Collected: 01/27/17 11:39 AM Last Resulted: 01/27/17 12:07 PM               Ref Range 1/27/17 11:06 AM   Flag   Color Urine  Yellow    Appearance Urine  Slightly Cloudy    Glucose Urine NEG mg/dL Negative    Bilirubin Urine NEG  Negative    Ketones Urine NEG mg/dL Negative    Specific Gravity Urine 1.003 - 1.035  1.015    Blood Urine NEG  Small A   pH Urine 5.0 - 7.0 pH 5.0    Protein Albumin Urine NEG mg/dL Negative    Urobilinogen Urine 0.2 - 1.0 EU/dL 0.2    Nitrite Urine NEG  Negative    Leukocyte Esterase Urine NEG  Large A   Source  Midstream Urine    Resulting Agency [facility]        Specimen Collected: 01/27/17 11:06 AM Last Resulted: 01/27/17 11:15 AM            Ref Range 1/27/17 11:06 AM   Flag    WBC Urine 0 - 2 /HPF 10-25 A   RBC Urine 0 - 2 /HPF O - 2    Hyaline Casts 0 - 2 /LPF 2-5 A   Squamous Epithelial /LPF Urine FEW /LPF Moderate A   Bacteria Urine NEG /HPF Many A   Resulting Agency [facility]        Specimen Collected: 01/27/17 11:06 AM Last Resulted: 01/27/17 11:15 AM

## 2017-01-27 NOTE — PROGRESS NOTES
SUBJECTIVE:                                                    Kina Sears is a 89 year old female who presents to clinic today for the following health issues:    Here today with son, Javier,  for follow up on UTI. Took Macrobid and started to have diarrhea. Unsure if diarrhea was from that or from Diflucan. Went to [facility] and was admitted to hospital for CHF. While was in the hospital did not get Macrobid. Did not have any diarrhea when was in the hospital. When got out of the hospital son started to give the Macrobid again, last dose was yesterday. Diarrhea started again after resuming the Macrobid. Is now having urinary symptoms again, pain with urination. Pain with sitting but that is because skin is raw from diarrhea that is having 3-4 times per day-is not able to control it. Is also incont of urine and wears a pad. Son bought bigger more absorbant pads but has not started wearing them yet. When was in the hospital stopped labateolol and started on metoprolol. Lasix was increased to twice per day, started on iron as well. Hates that has to get up multiple times per night to go to the bathroom. Is worried that may fall when is getting up so mch at night.     URINARY TRACT SYMPTOMS      Duration: over one month    Description  dysuria and dark yellow urine    Intensity:  severe    Accompanying signs and symptoms:  Fever/chills: no   Flank pain YES- bilateral  Nausea and vomiting: no   Vaginal symptoms: very painful-sitting is difficult  Abdominal/Pelvic Pain: no     History  History of frequent UTI's: YES  History of kidney stones: no   Sexually Active: no   Possibility of pregnancy: No    Precipitating or alleviating factors: None    Therapies tried and outcome: Diflucan and Macrobid prescribed at office visit 1/13/17. Symptoms improved initially but have now returned.  Went to [facility] 1/17/16-1/20/16 because she had fluid on her lungs.   While in the hospital Macrobid was not given as  prescribed.   Her son started giving her the Macrobid again in the afternoon 1/20/16 when she was discharged from the hospital.          Problem list and histories reviewed & adjusted, as indicated.  Additional history: as documented    Current Outpatient Prescriptions   Medication Sig Dispense Refill     POTASSIUM CHLORIDE PO Take 10 mEq by mouth       Diphenhydramine-APAP, sleep, (TYLENOL PM EXTRA STRENGTH PO)        Cholecalciferol (VITAMIN D) 2000 UNITS CAPS        nystatin (MYCOSTATIN) cream Apply topically 2 times daily       ciprofloxacin (CIPRO) 500 MG tablet Take 1 tablet (500 mg) by mouth 2 times daily 14 tablet 0     dimethicone-zinc oxide (EUCERIN) cream Apply three per day until area is cleared 142 g 1     metoprolol (TOPROL-XL) 25 MG 24 hr tablet Take 1 tablet (25 mg) by mouth daily 90 tablet 3     furosemide (LASIX) 40 MG tablet Take 1 tablet (40 mg) by mouth daily 90 tablet 3     lisinopril (PRINIVIL,ZESTRIL) 20 MG tablet Take 1 tablet (20 mg) by mouth daily 90 tablet 0     fluticasone (FLONASE) 50 MCG/ACT nasal spray Spray 2 sprays into both nostrils daily As needed for nasal congestion. 16 g 3     diclofenac (VOLTAREN) 1 % GEL Apply 4 grams to knees up to four times daily using enclosed dosing card as needed for pain 100 g 1     acetaminophen (TYLENOL) 325 MG tablet Take 650mg orally every morning and evening.  Make 650mg additionally as needed for pain every 4 hours, do no exceed 3000mg in 24 hour period 100 tablet 11     clotrimazole (LOTRIMIN) 1 % cream Apply topically 2 times daily As needed under arm and under breasts 15 g 1     omeprazole (PRILOSEC) 20 MG capsule Take 1 capsule (20 mg) by mouth daily 90 capsule 1     order for DME Equipment being ordered: 4 wheeled walker 1 Device 0     [DISCONTINUED] FUROSEMIDE PO Take 20 mg by mouth 2 times daily       [DISCONTINUED] METOPROLOL SUCCINATE ER PO Take 25 mg by mouth       nitroglycerin (NITROSTAT) 0.4 MG SL tablet Place 1 tablet (0.4 mg) under  the tongue See Admin Instructions EVERY 5 MIN AS NEEDED, UP TO 3 PER EPISODE 90 tablet 0     Allergies   Allergen Reactions     Sulfa Drugs Rash       ROS:  Review of Systems   Constitutional: Negative for fatigue.   HENT: Negative for ear pain, rhinorrhea and sore throat.    Respiratory: Negative for cough, chest tightness, shortness of breath and wheezing.    Cardiovascular: Negative for chest pain, palpitations and leg swelling.   Gastrointestinal: Positive for diarrhea. Negative for nausea, vomiting, abdominal pain, constipation and abdominal distention.   Endocrine: Negative for cold intolerance, heat intolerance, polydipsia, polyphagia and polyuria.   Genitourinary: Positive for dysuria, urgency, frequency and enuresis.   Musculoskeletal: Negative for arthralgias.   Neurological: Negative for dizziness, light-headedness, numbness and headaches.         OBJECTIVE:                                                    /60 mmHg  Pulse 92  Temp(Src) 97.7  F (36.5  C) (Tympanic)  SpO2 97%  There is no weight on file to calculate BMI.  Physical Exam   Constitutional: She appears well-developed.   HENT:   Right Ear: Tympanic membrane and external ear normal.   Left Ear: Tympanic membrane and external ear normal.   Nose: No mucosal edema or rhinorrhea.   Cardiovascular: Normal rate, regular rhythm and normal heart sounds.    Pulmonary/Chest: Effort normal and breath sounds normal.   Abdominal: Soft. Bowel sounds are normal.   Neurological: She is alert.   Skin: Skin is warm and dry.   Psychiatric: She has a normal mood and affect.         Diagnostic Test Results:  none      ASSESSMENT/PLAN:                                                    1. Dysuria    - UA reflex to Microscopic and Culture  - Urine Microscopic    2. Nonspecific finding on examination of urine  - Urine Culture Aerobic Bacterial    3. Acute on chronic combined systolic and diastolic congestive heart failure (H)  Metoprolol refilled to mail  order pharmacy   Change lasix from 20 mg orally to 40 mg orally daily  Recheck labs today   - metoprolol (TOPROL-XL) 25 MG 24 hr tablet; Take 1 tablet (25 mg) by mouth daily  Dispense: 90 tablet; Refill: 3  - CBC with platelets differential  - Comprehensive metabolic panel  - N terminal pro BNP outpatient  - furosemide (LASIX) 40 MG tablet; Take 1 tablet (40 mg) by mouth daily  Dispense: 90 tablet; Refill: 3  No signs of fluid overload at visit today    4. Acute cystitis with hematuria  Patient counseled regarding prevention of UTI's  Patient instructed to return for fever, vomiting, rash, flank/back pain or if symptoms not resolved in 2 days  Will send urine for C&S  Encouraged to eat yogurt   - ciprofloxacin (CIPRO) 500 MG tablet; Take 1 tablet (500 mg) by mouth 2 times daily  Dispense: 14 tablet; Refill: 0    5. Candida infection  Educated on use of cream  - dimethicone-zinc oxide (EUCERIN) cream; Apply three per day until area is cleared  Dispense: 142 g; Refill: 1        ROBBIE Davis [facility]

## 2017-01-30 ENCOUNTER — TELEPHONE (OUTPATIENT)
Dept: FAMILY MEDICINE | Facility: CLINIC | Age: 82
End: 2017-01-30

## 2017-01-30 NOTE — TELEPHONE ENCOUNTER
Received fax from [facility] Stephanie Archuleta     Re: furosemide 20mg    Request note: patients son statted 1 tablet 2 times a day requesting qty 180    furosemide (LASIX) 40 MG tablet, Sig: Take 1 tablet (40 mg) by mouth daily, Disp #90 x 3, Approved 01/27/2017    Note: no hx of 20mg in chart

## 2017-01-30 NOTE — TELEPHONE ENCOUNTER
Patient's son reports that she is now taking lasix 40 mg every morning. She does have a few of the 20 mg tablets and she is taking 2 of the 20 mg tablets until the 40 mg tablets arrive.  Kathi Montenegro RN

## 2017-01-30 NOTE — PROGRESS NOTES
Quick Note:    Kina,   Your urine culture is positive. You are on the right antibiotic. Make sure to take the full course of the antibiotic. Please return 3 days after your antibiotic is finished to resubmit a urine sample to make sure that infection is gone. Hope the diarrhea is better!    Please call or email with any additional questions or concerns  ROBBIE Solano    ______

## 2017-01-31 ENCOUNTER — TELEPHONE (OUTPATIENT)
Dept: FAMILY MEDICINE | Facility: CLINIC | Age: 82
End: 2017-01-31

## 2017-01-31 NOTE — TELEPHONE ENCOUNTER
Patient's son notified of recommendations below to stop cipro. He will have facility draw labs for BMP on Thursday and have them fax result to Dr. Butcher. He was unaware that patient was to stop potasium. He is going to check his discharge paper from 1/27/17 office visit.  Kathi Montenegro RN

## 2017-01-31 NOTE — TELEPHONE ENCOUNTER
Patient's son Javier called to report that troy is having numerous episodes of diarrhea. She cannot even make it to the bathroom. She is not eating or drinking now because she is afraid that it will continue to go right through her. Patient has had a total of 5 days of the prescribed seven of cipro 500 mg BID. He is worried about her becoming dehydrated. He would like to know if they are able to stop the antibiotic 2 days early to help with the diarrhea. Advised to have patient take antibiotic with food, add yogurt and a probiotic. Encourage fluid intake and watch for signs of dehydration. Patient does not want to eat or drink due to the diarrhea. Son is over 100 miles away and cannot get to the store for her.   Kathi Montenegro RN

## 2017-01-31 NOTE — TELEPHONE ENCOUNTER
I think it is fine for her to stop the cipro.    I would like to see her BMP repeated this week.  Perhaps this could be drawn at her assisted living and results sent to us?    Please also confirm that she has stopped her potassium supplement per Margaret's result noted from 1/27..    Melisa Butcher

## 2017-01-31 NOTE — TELEPHONE ENCOUNTER
The stop potassium was on the lab result from 1/27 when it was discovered that her potassium was elevated, not from the office visit.  Please see if he got the letter with that information.    Melisa Butcher

## 2017-02-01 ENCOUNTER — TELEPHONE (OUTPATIENT)
Dept: FAMILY MEDICINE | Facility: CLINIC | Age: 82
End: 2017-02-01

## 2017-02-01 NOTE — TELEPHONE ENCOUNTER
Received a call from Javier pt son, he is asking if we can f/u on conversation of yesterday , I reviewed with him notes and he needs an order for the assisted living staff to stop her potassium and stop her Cipro  Asking them to have a BMP done tomorrow per Dr Butcher  I called and spoke with nursing director and pt is on TAB self administrated meds,  They were not aware of need for change, she will go down and see pt and make appro changes --usaly her son set up his mother meds and [place],   I wrote a letter and faxed it to  [phone]   Today   I recalled son and advised him order sent   They are encouraging his mother to drink fluids   Son reporting  Her diarrhea is improved today  Per staff Kina is seen and managed by in house MD group  [facility]   Chart   To Dr Butcher for update  [facility]  RN/Angel Dill

## 2017-02-01 NOTE — TELEPHONE ENCOUNTER
Patient's son notified. Please new telephone encounter 2-1-17 for further information. Will close this encounter.  Magdalena

## 2017-02-02 ENCOUNTER — TRANSFERRED RECORDS (OUTPATIENT)
Dept: HEALTH INFORMATION MANAGEMENT | Facility: CLINIC | Age: 82
End: 2017-02-02

## 2017-02-02 LAB
CREAT SERPL-MCNC: 1.16 MG/DL (ref 0.55–1.02)
GFR SERPL CREATININE-BSD FRML MDRD: 43 ML/MIN/1.73M2
GLUCOSE SERPL-MCNC: 177 MG/DL (ref 70–100)
POTASSIUM SERPL-SCNC: 4.6 MMOL/L (ref 3.5–5.2)

## 2017-02-03 DIAGNOSIS — I10 ESSENTIAL HYPERTENSION WITH GOAL BLOOD PRESSURE LESS THAN 140/90: Primary | ICD-10-CM

## 2017-02-03 RX ORDER — LISINOPRIL 20 MG/1
20 TABLET ORAL DAILY
Qty: 90 TABLET | Refills: 0 | Status: SHIPPED | OUTPATIENT
Start: 2017-02-03 | End: 2017-05-03

## 2017-02-03 NOTE — TELEPHONE ENCOUNTER
Routing refill request to provider for review/approval because:  Labs out of range:  K+ elevated; creatinine elevated    Thelma Mccracken RN

## 2017-02-03 NOTE — TELEPHONE ENCOUNTER
Lisinopril 20mg      Last Written Prescription Date: 11/04/2016 #90 x 0  Last filled 11/09/2016  Last Office Visit with G, P or [facility] prescribing provider: 01/27/2017 MIGUEL Clement       POTASSIUM   Date Value Ref Range Status   01/27/2017 5.5* 3.4 - 5.3 mmol/L Final     CREATININE   Date Value Ref Range Status   01/27/2017 1.33* 0.52 - 1.04 mg/dL Final     BP Readings from Last 3 Encounters:   01/27/17 112/60   01/13/17 106/46   03/22/16 125/65

## 2017-02-07 ENCOUNTER — TELEPHONE (OUTPATIENT)
Dept: FAMILY MEDICINE | Facility: CLINIC | Age: 82
End: 2017-02-07

## 2017-02-07 NOTE — TELEPHONE ENCOUNTER
Pt son called and wanted to know if dr butcher got his mother labs back,  Dr Butcher Confirmed she did and that the labs looked good and were much better , tre states that he had stopped giving his mom the potassium and will continue not too.       Kristi Amezquita, Station [place]

## 2017-02-15 ENCOUNTER — COMMUNICATION - HEALTHEAST (OUTPATIENT)
Dept: ADMINISTRATIVE | Facility: CLINIC | Age: 82
End: 2017-02-15

## 2017-02-20 ENCOUNTER — TELEPHONE (OUTPATIENT)
Dept: FAMILY MEDICINE | Facility: CLINIC | Age: 82
End: 2017-02-20

## 2017-02-20 DIAGNOSIS — R19.7 DIARRHEA, UNSPECIFIED TYPE: Primary | ICD-10-CM

## 2017-02-20 NOTE — TELEPHONE ENCOUNTER
Pts son javier  is calling and states that his mother is still having diarrhea, he wants to know if any of the recent med changes could cause this, please call to triage.    Javier is stating since dr jennings is out of office,  Margaret Clement was involved in her previous care as well.         Kristi Amezquita, Station [place]

## 2017-02-20 NOTE — TELEPHONE ENCOUNTER
Patient's son reports that the bladder issues have resolved. Patient is having diarrhea now. She was recently switched to metoprolol. Son is wondering if this could be the cause. Also diarrhea is black in color, but she is taking an iron supplement. Son will call back to let us know more information about how many times per day she is having diarrhea, is it mucusy or liquid, does she have temp.

## 2017-02-20 NOTE — TELEPHONE ENCOUNTER
Patient's sone reports that she is having an episode of diarrhea every 30 minutes. He did not say how much the amount was. It is liquid with a few semi solid chunks of stool. Did not think that there was any mucus in it. Very dark in color. She is eating little amounts of food, but is drinking quite a bit of water during the day. The staff at the assisted living center have given her imodium a few times but did not seem to help. She is feeling fine otherwise. It is very hard for patient to come into clinic due to her age. He is wondering if she can have her stool tested or cultured. He can have staff collect it. Please advise. Patient would like to be called on land line first. Thank you.  Kathi Montenegro RN

## 2017-02-21 NOTE — TELEPHONE ENCOUNTER
"I spoke to Javier. He said that Kina has been complaining of diarrhea ever since she left the hospital 2 weeks ago. She has tried imodium and still has loose stools. Sometimes she has a formed stool then ift goes back to the diarrhea. He wasn't sure about a fever or cramping. He said I could check with Gayle at [phone] who is one of the nurses there.    I asked if she was under the care of the physician group at the home and he said she was but they just round every month and don't deal with day to day things. They might make recommendations but we have been going through Dr Butcher for her care.\"    I left a message for the nurse at the assisted living to call me back. Please check orders that you would like. Aida Avina RN    "

## 2017-02-21 NOTE — TELEPHONE ENCOUNTER
I am confused about what the plan is.  Last we contacted the assisted living they told us she was being seen by the physician group there.  If this is the case the assisted living should contact that group.    If not I need to know how long she has been having diarrhea to know if stool cultures are appropriate.    If Javier does not have the information perhaps we could talk with the nurse at the assisted living.    Melisa Butcher

## 2017-02-23 ENCOUNTER — TRANSFERRED RECORDS (OUTPATIENT)
Dept: HEALTH INFORMATION MANAGEMENT | Facility: CLINIC | Age: 82
End: 2017-02-23

## 2017-02-24 ENCOUNTER — TRANSFERRED RECORDS (OUTPATIENT)
Dept: HEALTH INFORMATION MANAGEMENT | Facility: CLINIC | Age: 82
End: 2017-02-24

## 2017-02-27 ENCOUNTER — TELEPHONE (OUTPATIENT)
Dept: FAMILY MEDICINE | Facility: CLINIC | Age: 82
End: 2017-02-27

## 2017-02-27 NOTE — TELEPHONE ENCOUNTER
ML for son to callback   I called the [facility]  Spoke with nurse there and they did get the stool sample today and it has been sent, -they will call with results as soon as they get them , patient  Not reporting any new sx or c/o, no temp ,  Taking fluids as normal still some loose stools   [facility]  [facility]  RN/Angel Dill    [facility]  [phone]

## 2017-02-27 NOTE — TELEPHONE ENCOUNTER
Please call Javier pt's son.  Received lab results from her assisted living.  Everything looks pretty good.  they have not been able to collect a liquid stool for the stool studies.  Please find out how if he has heard from her and how she is doing if he knows.  If not please contact the [facility] for an update..  Thanks.    Melisa Butcher

## 2017-02-28 NOTE — TELEPHONE ENCOUNTER
Patient son called and would like to talk to Dr. Butcher nurse.  He says he returning a call from the nurse.    [facility]

## 2017-02-28 NOTE — TELEPHONE ENCOUNTER
It would be very unusual for iron to cause diarrhea, it typically causes constipation.  We checked her iron (blood count) at the end of last week and she was still anemic.  The assisted living reported they were able to collect a stool sample so we'll see if it looks like there is any infection.  i would recommend continuing the iron for now.    Melisa Butcher

## 2017-02-28 NOTE — TELEPHONE ENCOUNTER
"Patient's son reports that she had missed her iron pills on Tuesday and Wednesday of last week. She felt great. No episodes of diarrhea. He is wondering if the diarrhea has any corralation to the iron pills. Patient is back on the iron pills and today she feels \"crummy\" again.  Also patient's son is requesting another set of labs to retest her iron.   Kathi Montenegro RN    "

## 2017-03-01 ENCOUNTER — TELEPHONE (OUTPATIENT)
Dept: FAMILY MEDICINE | Facility: CLINIC | Age: 82
End: 2017-03-01

## 2017-03-01 NOTE — TELEPHONE ENCOUNTER
Called and spoke with Javier, advised per Dr Butcher note, ok, will stop iron and update as need   PZach Spence  RN/Angel Dill

## 2017-03-01 NOTE — TELEPHONE ENCOUNTER
I did get her results from last week.  Her BMP was stable but her blood count remains low.   I have not yet received the stool results.  They can stop the iron supplement if they really feel that is the issue but I doubt it is.  Why don't they hold the iron for 1 week and then update me.    Melisa Butcher

## 2017-03-01 NOTE — TELEPHONE ENCOUNTER
Patient  SonSusan called  He is wanting to know if Dr Butcher rec the BMP from the assisted living, was done yesterday, and he reported the C diff was negative,  He wants  us to be aware last week he forgot her iron supplement for 3 days and his mother did NOT have diarrhea those days, re restarted it and she got diarrhea again, he is giving it at night  She is eating in her room as she is afraid to go to dinning room as she may need to use rest room or have an accident    Not sure how well she is eating   He would like to have the Hgb mehrdad to see if any improvement and possible to stop iron,   Discussed other ways to get iron in diet   Poss  liquid supplement   Boost or ensure   Advised will route to Dr Butcher- she is out of office today    [facility]  RN/Angel Dill

## 2017-03-09 ENCOUNTER — COMMUNICATION - HEALTHEAST (OUTPATIENT)
Dept: ADMINISTRATIVE | Facility: CLINIC | Age: 82
End: 2017-03-09

## 2017-03-09 ENCOUNTER — TELEPHONE (OUTPATIENT)
Dept: FAMILY MEDICINE | Facility: CLINIC | Age: 82
End: 2017-03-09

## 2017-03-09 ENCOUNTER — TELEPHONE (OUTPATIENT)
Dept: NURSING | Facility: CLINIC | Age: 82
End: 2017-03-09

## 2017-03-09 NOTE — TELEPHONE ENCOUNTER
Clinic Action Needed:Yes  Reason for Call: Patient is still having diarrhea.  Tried to contact [facility] to see if they received the order to get a stool sample, but nobody is answering the phone.  The patient said they collected and tested her stool, but can see no record.   Patient Recommendations/Teaching:Referred to clinic - within 24 hours  Teaching per [facility] guidelines.  Routed to:  Team Oscar Willams RN  [place] [facility]

## 2017-03-09 NOTE — TELEPHONE ENCOUNTER
"Call Type: Triage Call    Presenting Problem: \"I've had diarrhea for weeks now, why can't they  give me that pill?\" [facility] called to see if they  have recieved the order for C-Diff stool sample, but unable to  contact anyone.  Patient said they did test a stool sample, but  nothing in EPIC. Will keep trying to contact.  Triage Note:  Guideline Title: Diarrhea or Other Change in Bowel Habits  Recommended Disposition: See Provider within 24 hours  Original Inclination: Wanted to speak with a nurse  Override Disposition:  Intended Action: Follow advice given  Physician Contacted: No  Evaluated by provider AND symptoms not improving or worsening with suggested  treatment or home care ?  YES  Signs of dehydration ? NO  New or worsening signs and symptoms that may indicate shock ? NO  New onset of diarrhea AND any temperature elevation in an immunocompromised  individual or frail elderly ? NO  Severe pain/cramping in abdomen or rectum that interferes with ability to carry  out normal activities or sleep ? NO  Diarrhea associated with new abdominal bloating, distension or swelling ? NO  Diarrhea lasting longer than 72 hours AND not improving with home care ? NO  Passing red, black or tarry material from rectum AND onset of new signs and  symptoms of hypovolemia ? NO  Diarrhea (without blood in stool) following crampy abdominal pain AND repeated  vomiting that has not improved with 3 days of home care ? NO  Worms visible in stool/toilet AND not previously evaluated ? NO  Rectal bleeding (blood in or on the stool, more than scant; black tarry stools) ?  NO  Diarrhea began 3-5 days after starting an antibiotic ? NO  Bloody diarrhea AND has not been evaluated ? NO  Diarrhea began after any gastrointestinal (GI) surgery or procedure and is lasting  longer than defined in provider specified discharge information ? NO  Known diabetic and diarrhea or other change in bowel habits ? NO  High to low (but not zero) " risk of exposure to Ebola within the past 21 days ? NO  Traveled out of country in past 2 months and new onset of unexplained symptom(s) ?  NO  New onset of diarrhea with temperature of 101.5 F (38.6 C) or greater AND symptoms  not improving with 12 hours of home care ? NO  Diarrhea began with current or recent radiation or chemotherapy AND is not  improving with 24 hours of home care ? NO  Physician Instructions:  Care Advice: Call provider if symptoms worsen, such as high fever,  dizziness, severe abdominal cramping, or confusion or lethargy.  SYMPTOM / CONDITION MANAGEMENT  Go to the ED if you have developed signs and symptoms of dehydration such  as very dry mouth and tongue  increased pulse rate at rest  no urine output for 12 hours or more  increasing weakness or drowsiness, or lightheadedness when trying to sit  upright or standing.

## 2017-03-15 NOTE — TELEPHONE ENCOUNTER
Can you please call [facility] and ask them to resend the results to Dr Butcher? We do not have results of her stool studies. Thanks, Aida Avina RN

## 2017-03-15 NOTE — TELEPHONE ENCOUNTER
Left msg for [facility] to call me back and or fax the stool studies to us and gave our fax information.

## 2017-03-16 NOTE — TELEPHONE ENCOUNTER
"The Director of Nursing from [facility] called back. Her name is Fior and her number is [phone].      She said that Kina doesn't really have diarrhea. States, \"We had a heck of a time collecting for the c-diff because her stools were firm. She had one day this week where she had a firm stool and some loose stools with it, then the next day no stool and then they have been firm since. I think Kina is using  Diarrhea as an excuse not to go to the dining room. She prefers to stay in her room.\"    Nurse also said that they do have a rounding physician there that looks in on Kina. The family dispenses her medication so they don't have anything to do with that. Aida Avina RN    "

## 2017-03-16 NOTE — TELEPHONE ENCOUNTER
I received the fax that the C diff was negative.  Please continue to try [place] to get more info and what is going on with the patient.    Melisa Butcher

## 2017-03-16 NOTE — TELEPHONE ENCOUNTER
Left message on Coordinators phone that I need to speak with a nurse regarding Kina. Asked that they call back. Aida Avina RN

## 2017-03-17 NOTE — TELEPHONE ENCOUNTER
Spoke with both Fior and Javier and gave them Dr. Butcher's recommendation that Kina restart on her iron supplement. Javier will not see Kina until Monday but he will get her restarted on it then.    Thelma Mccracken RN

## 2017-03-17 NOTE — TELEPHONE ENCOUNTER
Okay.  I think they just need to communicate that to Javier then.  If we get another call regarding this we'll direct it back to Stacie Kennedy to address with Javier and Kina.    Please do however ask them to restart Kina's iron supplement then.  Javier had requested to hold it due to the diarrhea.    Thanks.  Melisa Butcher

## 2017-04-14 PROBLEM — I50.43 ACUTE ON CHRONIC COMBINED SYSTOLIC AND DIASTOLIC CHF (CONGESTIVE HEART FAILURE) (H): Status: ACTIVE | Noted: 2017-01-27

## 2017-05-03 DIAGNOSIS — I10 ESSENTIAL HYPERTENSION WITH GOAL BLOOD PRESSURE LESS THAN 140/90: ICD-10-CM

## 2017-05-03 NOTE — TELEPHONE ENCOUNTER
lisinopril (PRINIVIL/ZESTRIL) 20 MG tablet      Last Written Prescription Date: 2/3/17  Last Fill Quantity: 90, # refills: 0  Last Office Visit with G, P or [facility] prescribing provider: 1/27/17       Potassium   Date Value Ref Range Status   02/02/2017 4.6 3.5 - 5.2 mmol/L Final     Creatinine   Date Value Ref Range Status   02/02/2017 1.16 (A) 0.55 - 1.02 mg/dL Final     BP Readings from Last 3 Encounters:   01/27/17 112/60   01/13/17 106/46   03/22/16 125/65

## 2017-05-04 NOTE — TELEPHONE ENCOUNTER
Routing refill request to provider for review/approval because:  Labs abnormal. Aida Avina RN

## 2017-05-05 ENCOUNTER — TELEPHONE (OUTPATIENT)
Dept: FAMILY MEDICINE | Facility: CLINIC | Age: 82
End: 2017-05-05

## 2017-05-05 RX ORDER — LISINOPRIL 20 MG/1
TABLET ORAL
Qty: 90 TABLET | Refills: 0 | Status: SHIPPED | OUTPATIENT
Start: 2017-05-05 | End: 2017-08-01

## 2017-05-05 NOTE — TELEPHONE ENCOUNTER
I do not have an update potassium level since the medication was stopped in Jan.  If she has had blood work done at her Assisted living to check the potassium by one of the other providers they would have that informaiton.  If she has not had that updated then we could certainly order that.    It might be easier to keep her care with the group that sees patients through her assisted living so that they have all of the information available to them for her care.    Melisa Butcher

## 2017-05-05 NOTE — TELEPHONE ENCOUNTER
Reason for call: question      Patients son Javier called. He said his Mother has been off of the Potassium Chloride for a bit now, and is wondering if she should start back on. He would like to hear from Dr. Butcher's care team. He also states that Mom is in assisted living, so other Doctors are treating her as well for different things.  Mila Judd  Clinic Station  [facility]                Phone Number patient can be reached at: Cell is best today [phone]  Best Time:  any    Can we leave a detailed message on this number:  YES    Call taken on 5/5/2017 at 1:39 PM by Mila Judd

## 2017-05-05 NOTE — TELEPHONE ENCOUNTER
I left a message on Javier's phone regarding Dr Seth message below. The rounding MD's may have done a potassium level and he should check with the assisted living facility. Aida Avina RN

## 2017-05-23 ENCOUNTER — TELEPHONE (OUTPATIENT)
Dept: FAMILY MEDICINE | Facility: CLINIC | Age: 82
End: 2017-05-23

## 2017-05-23 NOTE — TELEPHONE ENCOUNTER
Son calls with concern of his mom having diarrhea back again. She had diarrhea from when she was discharged from the hospital, they finally got it under control but now has had it for the past week again. She lives at an assisted living and is seen by [facility] that comes into the assisted living. She was on Cholestyramine which her son took her off of because it didn't seem to help. He had her on Immodium which was helping but now has come back again. He also took her off of the potassium chloride thinking that may be the culprit as well. He also has a call into the Physicians group as well wondering why they did not draw a BMP this time. '    He is wondering why she was switched from labetalol to metoprolol in the hospital as she had been on that for many many years and was working just fine? Is wondering if that could be causing the diarrhea? Also wondering out of the potassium chloride, metoprolol and the furosemide which one has the most chance of causing diarrhea?     I did advise that she would probably need to be see if he wants Dr Butcher to give her thoughts on this as she has not seen her in over a year. He lives far away and is hard to get her here that is why he went with this physicians group that comes into the assisted living but he does understand the importance of staying with one or the other for better care for his mom.     Maria A Herrmann RN

## 2017-05-23 NOTE — TELEPHONE ENCOUNTER
She was likely switched from the labetalol to metoprolol due to a formulary issue during her hospital stay.  I do not think the metoprolol or potassium are likely causing the diarrhea.  The furosemide can rarely but it is very important not to stop that medication unless instructed by her doctor.    Unfortunately I do not know what is causing the diarrhea.    I think it is important that they use the physician group at her assisted living since it is difficult for her to get in here.  They also have acces to all of her nursing records and recent lab results.  When 2 different physicians or groups are both trying to manage the same issue without knowledge of what the other is doing it really complicates matters, results in duplicate testing and potentially danger to his mom if various medications are being prescribed.    I would encourage him to continue to pursue management of this issue through her provider in the assisted living and their physician group.    Melisa Butcher

## 2017-05-23 NOTE — TELEPHONE ENCOUNTER
I have attempted to contact this patient by phone with the following results: left message to return my call on answering machine.    MariaA Herrmann RN

## 2017-05-23 NOTE — TELEPHONE ENCOUNTER
Pt. Son left .stating he had questions about pt.  Called son back and he stated pt has continuing diarrhea.  He thinks it is from med's. Son requested Dr. Butcher return his call.  Please triage.    [facility]

## 2017-05-30 ENCOUNTER — TRANSFERRED RECORDS (OUTPATIENT)
Dept: HEALTH INFORMATION MANAGEMENT | Facility: CLINIC | Age: 82
End: 2017-05-30

## 2017-08-01 DIAGNOSIS — I10 ESSENTIAL HYPERTENSION WITH GOAL BLOOD PRESSURE LESS THAN 140/90: ICD-10-CM

## 2017-08-01 RX ORDER — LISINOPRIL 20 MG/1
TABLET ORAL
Qty: 90 TABLET | Refills: 0 | Status: SHIPPED | OUTPATIENT
Start: 2017-08-01 | End: 2018-01-01

## 2017-08-01 NOTE — TELEPHONE ENCOUNTER
Prescription approved per [facility] Refill Protocol or patient Primary care provider (PCP)  VASU Spence RN/Angel Dill

## 2017-08-01 NOTE — TELEPHONE ENCOUNTER
Lisinopril 20mg      Last Written Prescription Date: 05/05/2017 #90 x 0  Last filled 05/08/2017  Last Office Visit with G, P or [facility] prescribing provider: 01/27/2017 MIGUEL Clement       Potassium   Date Value Ref Range Status   02/02/2017 4.6 3.5 - 5.2 mmol/L Final     Creatinine   Date Value Ref Range Status   02/02/2017 1.16 (A) 0.55 - 1.02 mg/dL Final     BP Readings from Last 3 Encounters:   01/27/17 112/60   01/13/17 106/46   03/22/16 125/65

## 2018-01-01 ENCOUNTER — RECORDS - HEALTHEAST (OUTPATIENT)
Dept: LAB | Facility: CLINIC | Age: 83
End: 2018-01-01

## 2018-01-01 ENCOUNTER — APPOINTMENT (OUTPATIENT)
Dept: PHYSICAL THERAPY | Facility: CLINIC | Age: 83
End: 2018-01-01
Payer: MEDICARE

## 2018-01-01 ENCOUNTER — TELEPHONE (OUTPATIENT)
Dept: GERIATRICS | Facility: CLINIC | Age: 83
End: 2018-01-01

## 2018-01-01 ENCOUNTER — TRANSFERRED RECORDS (OUTPATIENT)
Dept: HEALTH INFORMATION MANAGEMENT | Facility: CLINIC | Age: 83
End: 2018-01-01

## 2018-01-01 ENCOUNTER — NURSING HOME VISIT (OUTPATIENT)
Dept: FAMILY MEDICINE | Facility: OTHER | Age: 83
End: 2018-01-01
Payer: COMMERCIAL

## 2018-01-01 ENCOUNTER — NURSING HOME VISIT (OUTPATIENT)
Dept: GERIATRICS | Facility: CLINIC | Age: 83
End: 2018-01-01
Payer: MEDICARE

## 2018-01-01 ENCOUNTER — NURSING HOME VISIT (OUTPATIENT)
Dept: GERIATRICS | Facility: CLINIC | Age: 83
End: 2018-01-01
Payer: COMMERCIAL

## 2018-01-01 ENCOUNTER — APPOINTMENT (OUTPATIENT)
Dept: PHYSICAL THERAPY | Facility: CLINIC | Age: 83
End: 2018-01-01
Attending: FAMILY MEDICINE
Payer: MEDICARE

## 2018-01-01 ENCOUNTER — APPOINTMENT (OUTPATIENT)
Dept: GENERAL RADIOLOGY | Facility: CLINIC | Age: 83
End: 2018-01-01
Attending: EMERGENCY MEDICINE
Payer: MEDICARE

## 2018-01-01 ENCOUNTER — DOCUMENTATION ONLY (OUTPATIENT)
Dept: CARE COORDINATION | Facility: CLINIC | Age: 83
End: 2018-01-01

## 2018-01-01 ENCOUNTER — HOSPITAL ENCOUNTER (OUTPATIENT)
Facility: CLINIC | Age: 83
Setting detail: OBSERVATION
Discharge: NURSING, REHAB OR LONG-TERM CARE | End: 2018-10-16
Attending: EMERGENCY MEDICINE | Admitting: PEDIATRICS
Payer: MEDICARE

## 2018-01-01 VITALS
DIASTOLIC BLOOD PRESSURE: 58 MMHG | SYSTOLIC BLOOD PRESSURE: 96 MMHG | WEIGHT: 112.6 LBS | TEMPERATURE: 96.5 F | BODY MASS INDEX: 22.74 KG/M2 | RESPIRATION RATE: 25 BRPM | HEART RATE: 70 BPM | OXYGEN SATURATION: 93 %

## 2018-01-01 VITALS
SYSTOLIC BLOOD PRESSURE: 117 MMHG | DIASTOLIC BLOOD PRESSURE: 68 MMHG | BODY MASS INDEX: 23.77 KG/M2 | HEART RATE: 82 BPM | RESPIRATION RATE: 24 BRPM | WEIGHT: 117.7 LBS | TEMPERATURE: 98.4 F | OXYGEN SATURATION: 93 %

## 2018-01-01 VITALS
DIASTOLIC BLOOD PRESSURE: 90 MMHG | RESPIRATION RATE: 20 BRPM | OXYGEN SATURATION: 96 % | BODY MASS INDEX: 24.4 KG/M2 | SYSTOLIC BLOOD PRESSURE: 151 MMHG | TEMPERATURE: 97.8 F | WEIGHT: 120.8 LBS | HEART RATE: 101 BPM

## 2018-01-01 VITALS
TEMPERATURE: 98.4 F | BODY MASS INDEX: 26.1 KG/M2 | DIASTOLIC BLOOD PRESSURE: 68 MMHG | RESPIRATION RATE: 36 BRPM | HEART RATE: 92 BPM | OXYGEN SATURATION: 96 % | SYSTOLIC BLOOD PRESSURE: 145 MMHG | WEIGHT: 129.2 LBS

## 2018-01-01 VITALS
HEART RATE: 87 BPM | OXYGEN SATURATION: 90 % | RESPIRATION RATE: 23 BRPM | SYSTOLIC BLOOD PRESSURE: 136 MMHG | BODY MASS INDEX: 25.29 KG/M2 | WEIGHT: 125.44 LBS | DIASTOLIC BLOOD PRESSURE: 44 MMHG | TEMPERATURE: 98.9 F | HEIGHT: 59 IN

## 2018-01-01 VITALS
WEIGHT: 109.6 LBS | OXYGEN SATURATION: 92 % | DIASTOLIC BLOOD PRESSURE: 61 MMHG | HEART RATE: 116 BPM | RESPIRATION RATE: 24 BRPM | BODY MASS INDEX: 22.14 KG/M2 | TEMPERATURE: 98 F | SYSTOLIC BLOOD PRESSURE: 143 MMHG

## 2018-01-01 VITALS
BODY MASS INDEX: 25.13 KG/M2 | WEIGHT: 124.4 LBS | DIASTOLIC BLOOD PRESSURE: 84 MMHG | RESPIRATION RATE: 22 BRPM | TEMPERATURE: 97.8 F | HEART RATE: 101 BPM | SYSTOLIC BLOOD PRESSURE: 143 MMHG | OXYGEN SATURATION: 93 %

## 2018-01-01 VITALS
DIASTOLIC BLOOD PRESSURE: 72 MMHG | SYSTOLIC BLOOD PRESSURE: 149 MMHG | RESPIRATION RATE: 24 BRPM | TEMPERATURE: 97.2 F | OXYGEN SATURATION: 99 % | BODY MASS INDEX: 27.95 KG/M2 | WEIGHT: 138.4 LBS | HEART RATE: 82 BPM

## 2018-01-01 VITALS
OXYGEN SATURATION: 99 % | TEMPERATURE: 96.3 F | SYSTOLIC BLOOD PRESSURE: 161 MMHG | RESPIRATION RATE: 21 BRPM | DIASTOLIC BLOOD PRESSURE: 74 MMHG | HEART RATE: 73 BPM

## 2018-01-01 DIAGNOSIS — Z78.9 IMPAIRED MOBILITY AND ACTIVITIES OF DAILY LIVING: Primary | ICD-10-CM

## 2018-01-01 DIAGNOSIS — L97.921 ULCER OF LEFT LOWER EXTREMITY, LIMITED TO BREAKDOWN OF SKIN (H): ICD-10-CM

## 2018-01-01 DIAGNOSIS — R32 URINARY INCONTINENCE, UNSPECIFIED TYPE: ICD-10-CM

## 2018-01-01 DIAGNOSIS — Z74.09 IMPAIRED MOBILITY AND ACTIVITIES OF DAILY LIVING: ICD-10-CM

## 2018-01-01 DIAGNOSIS — R33.9 URINARY RETENTION: ICD-10-CM

## 2018-01-01 DIAGNOSIS — I50.9 ACUTE CONGESTIVE HEART FAILURE, UNSPECIFIED HEART FAILURE TYPE (H): Primary | ICD-10-CM

## 2018-01-01 DIAGNOSIS — R09.81 SINUS CONGESTION: ICD-10-CM

## 2018-01-01 DIAGNOSIS — M15.0 PRIMARY OSTEOARTHRITIS INVOLVING MULTIPLE JOINTS: ICD-10-CM

## 2018-01-01 DIAGNOSIS — L89.152 PRESSURE INJURY OF COCCYGEAL REGION, STAGE 2 (H): ICD-10-CM

## 2018-01-01 DIAGNOSIS — Z78.9 IMPAIRED MOBILITY AND ACTIVITIES OF DAILY LIVING: ICD-10-CM

## 2018-01-01 DIAGNOSIS — L03.116 CELLULITIS OF LEFT LOWER EXTREMITY: ICD-10-CM

## 2018-01-01 DIAGNOSIS — I10 ESSENTIAL HYPERTENSION WITH GOAL BLOOD PRESSURE LESS THAN 140/90: ICD-10-CM

## 2018-01-01 DIAGNOSIS — I50.42 CHRONIC COMBINED SYSTOLIC AND DIASTOLIC CONGESTIVE HEART FAILURE (H): Primary | ICD-10-CM

## 2018-01-01 DIAGNOSIS — R52 GENERALIZED PAIN: ICD-10-CM

## 2018-01-01 DIAGNOSIS — K21.9 GASTROESOPHAGEAL REFLUX DISEASE WITHOUT ESOPHAGITIS: ICD-10-CM

## 2018-01-01 DIAGNOSIS — R53.1 WEAKNESS: ICD-10-CM

## 2018-01-01 DIAGNOSIS — I50.43 ACUTE ON CHRONIC COMBINED SYSTOLIC AND DIASTOLIC CONGESTIVE HEART FAILURE (H): ICD-10-CM

## 2018-01-01 DIAGNOSIS — R41.0 CONFUSION: ICD-10-CM

## 2018-01-01 DIAGNOSIS — I50.22 CHRONIC SYSTOLIC CONGESTIVE HEART FAILURE (H): ICD-10-CM

## 2018-01-01 DIAGNOSIS — R33.9 URINE RETENTION: ICD-10-CM

## 2018-01-01 DIAGNOSIS — I10 HYPERTENSION GOAL BP (BLOOD PRESSURE) < 140/90: ICD-10-CM

## 2018-01-01 DIAGNOSIS — T83.511D URINARY TRACT INFECTION ASSOCIATED WITH INDWELLING URETHRAL CATHETER, SUBSEQUENT ENCOUNTER: ICD-10-CM

## 2018-01-01 DIAGNOSIS — Z86.79 H/O ANGINA PECTORIS: ICD-10-CM

## 2018-01-01 DIAGNOSIS — Z74.09 IMPAIRED MOBILITY AND ACTIVITIES OF DAILY LIVING: Primary | ICD-10-CM

## 2018-01-01 DIAGNOSIS — I50.42 CHRONIC COMBINED SYSTOLIC AND DIASTOLIC CHF (CONGESTIVE HEART FAILURE) (H): ICD-10-CM

## 2018-01-01 DIAGNOSIS — K21.9 GASTROESOPHAGEAL REFLUX DISEASE, ESOPHAGITIS PRESENCE NOT SPECIFIED: ICD-10-CM

## 2018-01-01 DIAGNOSIS — I25.10 CORONARY ARTERY DISEASE INVOLVING NATIVE CORONARY ARTERY OF NATIVE HEART WITHOUT ANGINA PECTORIS: ICD-10-CM

## 2018-01-01 DIAGNOSIS — I50.42 CHRONIC COMBINED SYSTOLIC AND DIASTOLIC CONGESTIVE HEART FAILURE (H): ICD-10-CM

## 2018-01-01 DIAGNOSIS — L98.419 SKIN ULCER OF BUTTOCK, UNSPECIFIED ULCER STAGE (H): Primary | ICD-10-CM

## 2018-01-01 DIAGNOSIS — R41.0 CONFUSION: Primary | ICD-10-CM

## 2018-01-01 DIAGNOSIS — L97.429 ULCER OF HEEL AND MIDFOOT, LEFT, WITH UNSPECIFIED SEVERITY (H): ICD-10-CM

## 2018-01-01 DIAGNOSIS — N39.0 URINARY TRACT INFECTION ASSOCIATED WITH INDWELLING URETHRAL CATHETER, SUBSEQUENT ENCOUNTER: ICD-10-CM

## 2018-01-01 LAB
ALBUMIN SERPL-MCNC: 2.2 G/DL (ref 3.4–5)
ALBUMIN UR-MCNC: NEGATIVE MG/DL
ALBUMIN UR-MCNC: NEGATIVE MG/DL
ALP SERPL-CCNC: 87 U/L (ref 40–150)
ALT SERPL W P-5'-P-CCNC: 14 U/L (ref 0–50)
ANION GAP SERPL CALCULATED.3IONS-SCNC: 11 MMOL/L (ref 3–14)
ANION GAP SERPL CALCULATED.3IONS-SCNC: 12 MMOL/L (ref 5–18)
ANION GAP SERPL CALCULATED.3IONS-SCNC: 13 MMOL/L (ref 5–18)
ANION GAP SERPL CALCULATED.3IONS-SCNC: 13 MMOL/L (ref 5–18)
ANION GAP SERPL CALCULATED.3IONS-SCNC: 7 MMOL/L (ref 5–18)
ANION GAP SERPL CALCULATED.3IONS-SCNC: 7 MMOL/L (ref 5–18)
ANION GAP SERPL CALCULATED.3IONS-SCNC: 8 MMOL/L (ref 3–14)
ANION GAP SERPL CALCULATED.3IONS-SCNC: 9 MMOL/L (ref 3–14)
APPEARANCE UR: CLEAR
APPEARANCE UR: CLEAR
AST SERPL W P-5'-P-CCNC: 19 U/L (ref 0–45)
BACTERIA SPEC CULT: NO GROWTH
BASE EXCESS BLDV CALC-SCNC: 0.2 MMOL/L
BASE EXCESS BLDV CALC-SCNC: 6.4 MMOL/L
BASOPHILS # BLD AUTO: 0 10E9/L (ref 0–0.2)
BASOPHILS NFR BLD AUTO: 0.4 %
BILIRUB SERPL-MCNC: 0.2 MG/DL (ref 0.2–1.3)
BILIRUB UR QL STRIP: NEGATIVE
BILIRUB UR QL STRIP: NEGATIVE
BUN SERPL-MCNC: 12 MG/DL (ref 7–30)
BUN SERPL-MCNC: 12 MG/DL (ref 8–28)
BUN SERPL-MCNC: 13 MG/DL (ref 7–30)
BUN SERPL-MCNC: 13 MG/DL (ref 7–30)
BUN SERPL-MCNC: 13 MG/DL (ref 8–28)
BUN SERPL-MCNC: 13 MG/DL (ref 8–28)
BUN SERPL-MCNC: 15 MG/DL (ref 8–28)
BUN SERPL-MCNC: 15 MG/DL (ref 8–28)
BUN SERPL-MCNC: 16 MG/DL (ref 8–28)
BUN SERPL-MCNC: 16 MG/DL (ref 8–28)
CALCIUM SERPL-MCNC: 7.3 MG/DL (ref 8.5–10.1)
CALCIUM SERPL-MCNC: 7.5 MG/DL (ref 8.5–10.1)
CALCIUM SERPL-MCNC: 8.1 MG/DL (ref 8.5–10.1)
CALCIUM SERPL-MCNC: 8.2 MG/DL (ref 8.5–10.5)
CALCIUM SERPL-MCNC: 8.2 MG/DL (ref 8.5–10.5)
CALCIUM SERPL-MCNC: 8.6 MG/DL (ref 8.5–10.5)
CALCIUM SERPL-MCNC: 8.7 MG/DL (ref 8.5–10.5)
CALCIUM SERPL-MCNC: 8.7 MG/DL (ref 8.5–10.5)
CALCIUM SERPL-MCNC: 8.9 MG/DL (ref 8.5–10.5)
CALCIUM SERPL-MCNC: 8.9 MG/DL (ref 8.5–10.5)
CHLORIDE BLD-SCNC: 100 MMOL/L (ref 98–107)
CHLORIDE BLD-SCNC: 104 MMOL/L (ref 98–107)
CHLORIDE BLD-SCNC: 105 MMOL/L (ref 98–107)
CHLORIDE BLD-SCNC: 113 MMOL/L (ref 98–107)
CHLORIDE SERPL-SCNC: 103 MMOL/L (ref 94–109)
CHLORIDE SERPL-SCNC: 106 MMOL/L (ref 94–109)
CHLORIDE SERPL-SCNC: 108 MMOL/L (ref 94–109)
CHLORIDE SERPLBLD-SCNC: 100 MMOL/L (ref 98–107)
CHLORIDE SERPLBLD-SCNC: 104 MMOL/L (ref 98–107)
CHLORIDE SERPLBLD-SCNC: 105 MMOL/L (ref 98–107)
CO2 SERPL-SCNC: 17 MMOL/L (ref 22–31)
CO2 SERPL-SCNC: 21 MMOL/L (ref 20–32)
CO2 SERPL-SCNC: 23 MMOL/L (ref 22–31)
CO2 SERPL-SCNC: 23 MMOL/L (ref 22–31)
CO2 SERPL-SCNC: 27 MMOL/L (ref 20–32)
CO2 SERPL-SCNC: 27 MMOL/L (ref 22–31)
CO2 SERPL-SCNC: 28 MMOL/L (ref 20–32)
COLOR UR AUTO: ABNORMAL
COLOR UR AUTO: NORMAL
CREAT SERPL-MCNC: 0.51 MG/DL (ref 0.52–1.04)
CREAT SERPL-MCNC: 0.55 MG/DL (ref 0.52–1.04)
CREAT SERPL-MCNC: 0.59 MG/DL (ref 0.6–1.1)
CREAT SERPL-MCNC: 0.59 MG/DL (ref 0.6–1.1)
CREAT SERPL-MCNC: 0.62 MG/DL (ref 0.52–1.04)
CREAT SERPL-MCNC: 0.7 MG/DL (ref 0.6–1.1)
CREAT SERPL-MCNC: 0.7 MG/DL (ref 0.6–1.1)
CREAT SERPL-MCNC: 0.71 MG/DL (ref 0.6–1.1)
CREAT SERPL-MCNC: 0.71 MG/DL (ref 0.6–1.1)
CREAT SERPL-MCNC: 0.74 MG/DL (ref 0.6–1.1)
CREAT SERPL-MCNC: 0.74 MG/DL (ref 0.6–1.1)
DIFFERENTIAL METHOD BLD: ABNORMAL
EOSINOPHIL NFR BLD AUTO: 0.6 %
ERYTHROCYTE [DISTWIDTH] IN BLOOD BY AUTOMATED COUNT: 15.5 % (ref 11–14.5)
ERYTHROCYTE [DISTWIDTH] IN BLOOD BY AUTOMATED COUNT: 15.9 % (ref 10–15)
ERYTHROCYTE [DISTWIDTH] IN BLOOD BY AUTOMATED COUNT: 15.9 % (ref 10–15)
ERYTHROCYTE [DISTWIDTH] IN BLOOD BY AUTOMATED COUNT: 16.4 % (ref 11–14.5)
ERYTHROCYTE [DISTWIDTH] IN BLOOD BY AUTOMATED COUNT: 16.4 % (ref 11–14.5)
GFR SERPL CREATININE-BSD FRML MDRD: >60 ML/MIN/1.73M2
GFR SERPL CREATININE-BSD FRML MDRD: >90 ML/MIN/1.7M2
GLUCOSE BLD-MCNC: 116 MG/DL (ref 70–125)
GLUCOSE BLD-MCNC: 119 MG/DL (ref 70–125)
GLUCOSE BLD-MCNC: 68 MG/DL (ref 70–125)
GLUCOSE BLD-MCNC: 73 MG/DL (ref 70–125)
GLUCOSE SERPL-MCNC: 102 MG/DL (ref 70–99)
GLUCOSE SERPL-MCNC: 116 MG/DL (ref 70–125)
GLUCOSE SERPL-MCNC: 118 MG/DL (ref 70–99)
GLUCOSE SERPL-MCNC: 119 MG/DL (ref 70–125)
GLUCOSE SERPL-MCNC: 123 MG/DL (ref 70–99)
GLUCOSE SERPL-MCNC: 68 MG/DL (ref 70–125)
GLUCOSE SERPL-MCNC: 73 MG/DL (ref 70–125)
GLUCOSE UR STRIP-MCNC: NEGATIVE MG/DL
GLUCOSE UR STRIP-MCNC: NEGATIVE MG/DL
HCO3 BLDV-SCNC: 24 MMOL/L (ref 21–28)
HCO3 BLDV-SCNC: 29 MMOL/L (ref 21–28)
HCT VFR BLD AUTO: 27.4 % (ref 35–47)
HCT VFR BLD AUTO: 30.8 % (ref 35–47)
HCT VFR BLD AUTO: 30.9 % (ref 35–47)
HCT VFR BLD AUTO: 37.7 % (ref 35–47)
HCT VFR BLD AUTO: 37.7 % (ref 35–47)
HEMOGLOBIN: 11.2 G/DL (ref 12–16)
HGB BLD-MCNC: 11.2 G/DL (ref 12–16)
HGB BLD-MCNC: 8.4 G/DL (ref 11.7–15.7)
HGB BLD-MCNC: 9.5 G/DL (ref 12–16)
HGB BLD-MCNC: 9.6 G/DL (ref 11.7–15.7)
HGB BLD-MCNC: 9.6 G/DL (ref 11.7–15.7)
HGB UR QL STRIP: NEGATIVE
HGB UR QL STRIP: NEGATIVE
HYALINE CASTS #/AREA URNS LPF: 9 /LPF (ref 0–2)
IMM GRANULOCYTES # BLD: 0.1 10E9/L (ref 0–0.4)
IMM GRANULOCYTES NFR BLD: 1.4 %
KETONES UR STRIP-MCNC: NEGATIVE MG/DL
KETONES UR STRIP-MCNC: NEGATIVE MG/DL
LEUKOCYTE ESTERASE UR QL STRIP: NEGATIVE
LEUKOCYTE ESTERASE UR QL STRIP: NEGATIVE
LYMPHOCYTES # BLD AUTO: 1.6 10E9/L (ref 0.8–5.3)
LYMPHOCYTES NFR BLD AUTO: 16.3 %
MAGNESIUM SERPL-MCNC: 1.8 MG/DL (ref 1.6–2.3)
MCH RBC QN AUTO: 27.4 PG (ref 26.5–33)
MCH RBC QN AUTO: 27.7 PG (ref 26.5–33)
MCH RBC QN AUTO: 27.9 PG (ref 27–34)
MCH RBC QN AUTO: 28.1 PG (ref 27–34)
MCH RBC QN AUTO: 28.1 PG (ref 27–34)
MCHC RBC AUTO-ENTMCNC: 29.7 G/DL (ref 32–36)
MCHC RBC AUTO-ENTMCNC: 29.7 G/DL (ref 32–36)
MCHC RBC AUTO-ENTMCNC: 30.7 G/DL (ref 31.5–36.5)
MCHC RBC AUTO-ENTMCNC: 30.7 G/DL (ref 32–36)
MCHC RBC AUTO-ENTMCNC: 31.2 G/DL (ref 31.5–36.5)
MCV RBC AUTO: 89 FL (ref 78–100)
MCV RBC AUTO: 89 FL (ref 78–100)
MCV RBC AUTO: 91 FL (ref 80–100)
MCV RBC AUTO: 95 FL (ref 80–100)
MCV RBC AUTO: 95 FL (ref 80–100)
MONOCYTES # BLD AUTO: 0.6 10E9/L (ref 0–1.3)
MONOCYTES NFR BLD AUTO: 5.8 %
MUCOUS THREADS #/AREA URNS LPF: PRESENT /LPF
NEUTROPHILS # BLD AUTO: 7.6 10E9/L (ref 1.6–8.3)
NEUTROPHILS NFR BLD AUTO: 75.5 %
NITRATE UR QL: NEGATIVE
NITRATE UR QL: NEGATIVE
NRBC # BLD AUTO: 0 10*3/UL
NRBC BLD AUTO-RTO: 0 /100
NT-PROBNP SERPL-MCNC: ABNORMAL PG/ML (ref 0–1800)
O2/TOTAL GAS SETTING VFR VENT: 21 %
O2/TOTAL GAS SETTING VFR VENT: 21 %
PCO2 BLDV: 33 MM HG (ref 40–50)
PCO2 BLDV: 35 MM HG (ref 40–50)
PH BLDV: 7.45 PH (ref 7.32–7.43)
PH BLDV: 7.55 PH (ref 7.32–7.43)
PH UR STRIP: 5 PH (ref 5–7)
PH UR STRIP: 7 [PH] (ref 4.5–8)
PLATELET # BLD AUTO: 284 10E9/L (ref 150–450)
PLATELET # BLD AUTO: 291 THOU/UL (ref 140–440)
PLATELET # BLD AUTO: 291 THOU/UL (ref 140–440)
PLATELET # BLD AUTO: 368 10E9/L (ref 150–450)
PLATELET # BLD AUTO: 397 THOU/UL (ref 140–440)
PMV BLD AUTO: 10.5 FL (ref 8.5–12.5)
PMV BLD AUTO: 9.6 FL (ref 8.5–12.5)
PO2 BLDV: 58 MM HG (ref 25–47)
PO2 BLDV: 60 MM HG (ref 25–47)
POTASSIUM BLD-SCNC: 3.7 MMOL/L (ref 3.5–5)
POTASSIUM BLD-SCNC: 3.7 MMOL/L (ref 3.5–5)
POTASSIUM BLD-SCNC: 3.9 MMOL/L (ref 3.5–5)
POTASSIUM BLD-SCNC: 4.2 MMOL/L (ref 3.5–5)
POTASSIUM SERPL-SCNC: 3.3 MMOL/L (ref 3.4–5.3)
POTASSIUM SERPL-SCNC: 3.6 MMOL/L (ref 3.4–5.3)
POTASSIUM SERPL-SCNC: 3.7 MMOL/L (ref 3.5–5)
POTASSIUM SERPL-SCNC: 3.7 MMOL/L (ref 3.5–5)
POTASSIUM SERPL-SCNC: 3.9 MMOL/L (ref 3.5–5)
POTASSIUM SERPL-SCNC: 4.2 MMOL/L (ref 3.4–5.3)
POTASSIUM SERPL-SCNC: 4.2 MMOL/L (ref 3.5–5)
POTASSIUM SERPL-SCNC: 4.4 MMOL/L (ref 3.4–5.3)
PROT SERPL-MCNC: 6.2 G/DL (ref 6.8–8.8)
RBC # BLD AUTO: 3.07 10E12/L (ref 3.8–5.2)
RBC # BLD AUTO: 3.41 MILL/UL (ref 3.8–5.4)
RBC # BLD AUTO: 3.47 10E12/L (ref 3.8–5.2)
RBC # BLD AUTO: 3.99 MILL/UL (ref 3.8–5.4)
RBC # BLD AUTO: 3.99 MILL/UL (ref 3.8–5.4)
RBC #/AREA URNS AUTO: 1 /HPF (ref 0–2)
SODIUM SERPL-SCNC: 139 MMOL/L (ref 133–144)
SODIUM SERPL-SCNC: 139 MMOL/L (ref 136–145)
SODIUM SERPL-SCNC: 140 MMOL/L (ref 133–144)
SODIUM SERPL-SCNC: 140 MMOL/L (ref 136–145)
SODIUM SERPL-SCNC: 140 MMOL/L (ref 136–145)
SODIUM SERPL-SCNC: 142 MMOL/L (ref 133–144)
SODIUM SERPL-SCNC: 142 MMOL/L (ref 136–145)
SOURCE: ABNORMAL
SP GR UR STRIP: 1.01 (ref 1–1.03)
SP GR UR STRIP: 1.01 (ref 1–1.03)
SQUAMOUS #/AREA URNS AUTO: <1 /HPF (ref 0–1)
TROPONIN I SERPL-MCNC: 0.04 UG/L (ref 0–0.04)
UROBILINOGEN UR STRIP-ACNC: NORMAL
UROBILINOGEN UR STRIP-MCNC: 0 MG/DL (ref 0–2)
WBC # BLD AUTO: 10.1 10E9/L (ref 4–11)
WBC # BLD AUTO: 5.7 10E9/L (ref 4–11)
WBC # BLD AUTO: 6.2 THOU/UL (ref 4–11)
WBC #/AREA URNS AUTO: 1 /HPF (ref 0–5)
WBC: 10.1 THOU/UL (ref 4–11)
WBC: 6.2 THOU/UL (ref 4–11)
YEAST #/AREA URNS HPF: ABNORMAL /HPF

## 2018-01-01 PROCEDURE — A9270 NON-COVERED ITEM OR SERVICE: HCPCS | Mod: GY | Performed by: NURSE PRACTITIONER

## 2018-01-01 PROCEDURE — 99225 ZZC SUBSEQUENT OBSERVATION CARE,LEVEL II: CPT | Performed by: FAMILY MEDICINE

## 2018-01-01 PROCEDURE — 25000132 ZZH RX MED GY IP 250 OP 250 PS 637: Mod: GY | Performed by: EMERGENCY MEDICINE

## 2018-01-01 PROCEDURE — 25000132 ZZH RX MED GY IP 250 OP 250 PS 637: Performed by: INTERNAL MEDICINE

## 2018-01-01 PROCEDURE — 80048 BASIC METABOLIC PNL TOTAL CA: CPT | Performed by: NURSE PRACTITIONER

## 2018-01-01 PROCEDURE — 36415 COLL VENOUS BLD VENIPUNCTURE: CPT | Performed by: NURSE PRACTITIONER

## 2018-01-01 PROCEDURE — 82803 BLOOD GASES ANY COMBINATION: CPT | Performed by: EMERGENCY MEDICINE

## 2018-01-01 PROCEDURE — 93010 ELECTROCARDIOGRAM REPORT: CPT | Mod: Z6 | Performed by: EMERGENCY MEDICINE

## 2018-01-01 PROCEDURE — 36415 COLL VENOUS BLD VENIPUNCTURE: CPT | Performed by: PEDIATRICS

## 2018-01-01 PROCEDURE — 85025 COMPLETE CBC W/AUTO DIFF WBC: CPT | Performed by: EMERGENCY MEDICINE

## 2018-01-01 PROCEDURE — 40000193 ZZH STATISTIC PT WARD VISIT: Performed by: PHYSICAL THERAPIST

## 2018-01-01 PROCEDURE — 99285 EMERGENCY DEPT VISIT HI MDM: CPT | Mod: 25 | Performed by: EMERGENCY MEDICINE

## 2018-01-01 PROCEDURE — G0378 HOSPITAL OBSERVATION PER HR: HCPCS

## 2018-01-01 PROCEDURE — A9270 NON-COVERED ITEM OR SERVICE: HCPCS | Mod: GY | Performed by: EMERGENCY MEDICINE

## 2018-01-01 PROCEDURE — 99310 SBSQ NF CARE HIGH MDM 45: CPT | Performed by: NURSE PRACTITIONER

## 2018-01-01 PROCEDURE — 97162 PT EVAL MOD COMPLEX 30 MIN: CPT | Mod: GP | Performed by: PHYSICAL THERAPIST

## 2018-01-01 PROCEDURE — 96376 TX/PRO/DX INJ SAME DRUG ADON: CPT | Mod: 59 | Performed by: EMERGENCY MEDICINE

## 2018-01-01 PROCEDURE — 83735 ASSAY OF MAGNESIUM: CPT | Performed by: EMERGENCY MEDICINE

## 2018-01-01 PROCEDURE — 93005 ELECTROCARDIOGRAM TRACING: CPT | Mod: XU | Performed by: EMERGENCY MEDICINE

## 2018-01-01 PROCEDURE — 96376 TX/PRO/DX INJ SAME DRUG ADON: CPT

## 2018-01-01 PROCEDURE — 85027 COMPLETE CBC AUTOMATED: CPT | Performed by: NURSE PRACTITIONER

## 2018-01-01 PROCEDURE — 85018 HEMOGLOBIN: CPT | Performed by: FAMILY MEDICINE

## 2018-01-01 PROCEDURE — 96374 THER/PROPH/DIAG INJ IV PUSH: CPT | Mod: 59 | Performed by: EMERGENCY MEDICINE

## 2018-01-01 PROCEDURE — 25000132 ZZH RX MED GY IP 250 OP 250 PS 637: Mod: GY | Performed by: NURSE PRACTITIONER

## 2018-01-01 PROCEDURE — 99220 ZZC INITIAL OBSERVATION CARE,LEVL III: CPT | Mod: AI | Performed by: NURSE PRACTITIONER

## 2018-01-01 PROCEDURE — 97110 THERAPEUTIC EXERCISES: CPT | Mod: GP | Performed by: PHYSICAL THERAPIST

## 2018-01-01 PROCEDURE — 81001 URINALYSIS AUTO W/SCOPE: CPT | Performed by: EMERGENCY MEDICINE

## 2018-01-01 PROCEDURE — 80053 COMPREHEN METABOLIC PANEL: CPT | Performed by: EMERGENCY MEDICINE

## 2018-01-01 PROCEDURE — A9270 NON-COVERED ITEM OR SERVICE: HCPCS | Mod: GY | Performed by: FAMILY MEDICINE

## 2018-01-01 PROCEDURE — 36415 COLL VENOUS BLD VENIPUNCTURE: CPT | Performed by: FAMILY MEDICINE

## 2018-01-01 PROCEDURE — 99309 SBSQ NF CARE MODERATE MDM 30: CPT | Performed by: NURSE PRACTITIONER

## 2018-01-01 PROCEDURE — 82803 BLOOD GASES ANY COMBINATION: CPT | Performed by: FAMILY MEDICINE

## 2018-01-01 PROCEDURE — 25000132 ZZH RX MED GY IP 250 OP 250 PS 637: Mod: GY | Performed by: FAMILY MEDICINE

## 2018-01-01 PROCEDURE — 97530 THERAPEUTIC ACTIVITIES: CPT | Mod: GP | Performed by: PHYSICAL THERAPIST

## 2018-01-01 PROCEDURE — 99305 1ST NF CARE MODERATE MDM 35: CPT | Performed by: FAMILY MEDICINE

## 2018-01-01 PROCEDURE — 84484 ASSAY OF TROPONIN QUANT: CPT | Performed by: EMERGENCY MEDICINE

## 2018-01-01 PROCEDURE — 99217 ZZC OBSERVATION CARE DISCHARGE: CPT | Performed by: FAMILY MEDICINE

## 2018-01-01 PROCEDURE — 71046 X-RAY EXAM CHEST 2 VIEWS: CPT | Mod: TC

## 2018-01-01 PROCEDURE — 25000132 ZZH RX MED GY IP 250 OP 250 PS 637: Mod: GY | Performed by: INTERNAL MEDICINE

## 2018-01-01 PROCEDURE — 84132 ASSAY OF SERUM POTASSIUM: CPT | Performed by: PEDIATRICS

## 2018-01-01 PROCEDURE — 80048 BASIC METABOLIC PNL TOTAL CA: CPT | Performed by: FAMILY MEDICINE

## 2018-01-01 PROCEDURE — 83880 ASSAY OF NATRIURETIC PEPTIDE: CPT | Performed by: EMERGENCY MEDICINE

## 2018-01-01 PROCEDURE — 25000128 H RX IP 250 OP 636: Performed by: EMERGENCY MEDICINE

## 2018-01-01 PROCEDURE — 25000128 H RX IP 250 OP 636: Performed by: PEDIATRICS

## 2018-01-01 PROCEDURE — 99309 SBSQ NF CARE MODERATE MDM 30: CPT | Mod: GW | Performed by: NURSE PRACTITIONER

## 2018-01-01 PROCEDURE — 51702 INSERT TEMP BLADDER CATH: CPT | Performed by: EMERGENCY MEDICINE

## 2018-01-01 PROCEDURE — 99203 OFFICE O/P NEW LOW 30 MIN: CPT | Performed by: SPECIALIST

## 2018-01-01 RX ORDER — ACETAMINOPHEN 325 MG/1
650 TABLET ORAL EVERY 4 HOURS PRN
Qty: 100 TABLET | DISCHARGE
Start: 2018-01-01 | End: 2018-01-01

## 2018-01-01 RX ORDER — AMOXICILLIN 250 MG
1-2 CAPSULE ORAL DAILY PRN
COMMUNITY
End: 2018-01-01

## 2018-01-01 RX ORDER — METOPROLOL SUCCINATE 25 MG/1
TABLET, EXTENDED RELEASE ORAL
Qty: 90 TABLET | Refills: 1 | OUTPATIENT
Start: 2018-01-01

## 2018-01-01 RX ORDER — LISINOPRIL 10 MG/1
20 TABLET ORAL DAILY
Status: DISCONTINUED | OUTPATIENT
Start: 2018-01-01 | End: 2018-01-01 | Stop reason: HOSPADM

## 2018-01-01 RX ORDER — POTASSIUM CL/LIDO/0.9 % NACL 10MEQ/0.1L
10 INTRAVENOUS SOLUTION, PIGGYBACK (ML) INTRAVENOUS
Status: DISCONTINUED | OUTPATIENT
Start: 2018-01-01 | End: 2018-01-01 | Stop reason: HOSPADM

## 2018-01-01 RX ORDER — METOPROLOL SUCCINATE 25 MG/1
25 TABLET, EXTENDED RELEASE ORAL DAILY
Status: DISCONTINUED | OUTPATIENT
Start: 2018-01-01 | End: 2018-01-01 | Stop reason: HOSPADM

## 2018-01-01 RX ORDER — POTASSIUM CHLORIDE 7.45 MG/ML
10 INJECTION INTRAVENOUS
Status: DISCONTINUED | OUTPATIENT
Start: 2018-01-01 | End: 2018-01-01 | Stop reason: HOSPADM

## 2018-01-01 RX ORDER — FUROSEMIDE 10 MG/ML
20 INJECTION INTRAMUSCULAR; INTRAVENOUS ONCE
Status: COMPLETED | OUTPATIENT
Start: 2018-01-01 | End: 2018-01-01

## 2018-01-01 RX ORDER — NALOXONE HYDROCHLORIDE 0.4 MG/ML
.1-.4 INJECTION, SOLUTION INTRAMUSCULAR; INTRAVENOUS; SUBCUTANEOUS
Status: DISCONTINUED | OUTPATIENT
Start: 2018-01-01 | End: 2018-01-01 | Stop reason: HOSPADM

## 2018-01-01 RX ORDER — POTASSIUM CHLORIDE 1500 MG/1
20-40 TABLET, EXTENDED RELEASE ORAL
Status: DISCONTINUED | OUTPATIENT
Start: 2018-01-01 | End: 2018-01-01 | Stop reason: HOSPADM

## 2018-01-01 RX ORDER — ACETAMINOPHEN 325 MG/1
650 TABLET ORAL EVERY 4 HOURS PRN
Status: DISCONTINUED | OUTPATIENT
Start: 2018-01-01 | End: 2018-01-01 | Stop reason: HOSPADM

## 2018-01-01 RX ORDER — LIDOCAINE 40 MG/G
CREAM TOPICAL
Status: DISCONTINUED | OUTPATIENT
Start: 2018-01-01 | End: 2018-01-01 | Stop reason: HOSPADM

## 2018-01-01 RX ORDER — NYSTATIN 100000 [USP'U]/G
POWDER TOPICAL DAILY PRN
Status: ON HOLD | COMMUNITY
End: 2018-01-01

## 2018-01-01 RX ORDER — POTASSIUM CHLORIDE 1.5 G/1.58G
20-40 POWDER, FOR SOLUTION ORAL
Status: DISCONTINUED | OUTPATIENT
Start: 2018-01-01 | End: 2018-01-01 | Stop reason: HOSPADM

## 2018-01-01 RX ORDER — NITROGLYCERIN 0.4 MG/1
0.4 TABLET SUBLINGUAL EVERY 5 MIN PRN
Status: DISCONTINUED | OUTPATIENT
Start: 2018-01-01 | End: 2018-01-01 | Stop reason: HOSPADM

## 2018-01-01 RX ORDER — FUROSEMIDE 40 MG
TABLET ORAL
Qty: 90 TABLET | Refills: 0 | Status: SHIPPED | OUTPATIENT
Start: 2018-01-01 | End: 2018-01-01

## 2018-01-01 RX ORDER — METOPROLOL SUCCINATE 25 MG/1
TABLET, EXTENDED RELEASE ORAL
Qty: 90 TABLET | Refills: 1 | Status: SHIPPED | OUTPATIENT
Start: 2018-01-01 | End: 2018-01-01

## 2018-01-01 RX ORDER — FUROSEMIDE 20 MG
20 TABLET ORAL DAILY
Qty: 30 TABLET | Refills: 0 | DISCHARGE
Start: 2018-01-01 | End: 2018-01-01

## 2018-01-01 RX ORDER — BISACODYL 10 MG
10 SUPPOSITORY, RECTAL RECTAL DAILY PRN
COMMUNITY

## 2018-01-01 RX ORDER — FUROSEMIDE 20 MG
20 TABLET ORAL DAILY
Status: DISCONTINUED | OUTPATIENT
Start: 2018-01-01 | End: 2018-01-01

## 2018-01-01 RX ORDER — FERROUS SULFATE 325(65) MG
325 TABLET ORAL
COMMUNITY
End: 2018-01-01

## 2018-01-01 RX ORDER — LOPERAMIDE HCL 2 MG
2 CAPSULE ORAL 4 TIMES DAILY PRN
COMMUNITY
End: 2018-01-01

## 2018-01-01 RX ORDER — AMOXICILLIN 250 MG
1 CAPSULE ORAL AT BEDTIME
COMMUNITY
End: 2018-01-01

## 2018-01-01 RX ORDER — FUROSEMIDE 20 MG
20 TABLET ORAL DAILY
Status: DISCONTINUED | OUTPATIENT
Start: 2018-01-01 | End: 2018-01-01 | Stop reason: HOSPADM

## 2018-01-01 RX ORDER — POTASSIUM CHLORIDE 29.8 MG/ML
20 INJECTION INTRAVENOUS
Status: DISCONTINUED | OUTPATIENT
Start: 2018-01-01 | End: 2018-01-01 | Stop reason: HOSPADM

## 2018-01-01 RX ORDER — TRAMADOL HYDROCHLORIDE 50 MG/1
25 TABLET ORAL EVERY 6 HOURS PRN
Qty: 12 TABLET | Refills: 0 | Status: SHIPPED | OUTPATIENT
Start: 2018-01-01 | End: 2018-01-01

## 2018-01-01 RX ORDER — FUROSEMIDE 20 MG
20 TABLET ORAL 2 TIMES DAILY
Status: DISCONTINUED | OUTPATIENT
Start: 2018-01-01 | End: 2018-01-01

## 2018-01-01 RX ADMIN — TRAMADOL HYDROCHLORIDE 25 MG: 50 TABLET ORAL at 02:18

## 2018-01-01 RX ADMIN — ACETAMINOPHEN 650 MG: 325 TABLET ORAL at 22:51

## 2018-01-01 RX ADMIN — ACETAMINOPHEN 650 MG: 325 TABLET ORAL at 17:10

## 2018-01-01 RX ADMIN — ACETAMINOPHEN 650 MG: 325 TABLET ORAL at 08:04

## 2018-01-01 RX ADMIN — ACETAMINOPHEN 650 MG: 325 TABLET ORAL at 11:44

## 2018-01-01 RX ADMIN — ACETAMINOPHEN 650 MG: 325 TABLET ORAL at 16:36

## 2018-01-01 RX ADMIN — POTASSIUM CHLORIDE 20 MEQ: 1500 TABLET, EXTENDED RELEASE ORAL at 11:55

## 2018-01-01 RX ADMIN — FUROSEMIDE 20 MG: 10 INJECTION, SOLUTION INTRAVENOUS at 11:35

## 2018-01-01 RX ADMIN — FUROSEMIDE 20 MG: 20 TABLET ORAL at 16:36

## 2018-01-01 RX ADMIN — FUROSEMIDE 20 MG: 10 INJECTION, SOLUTION INTRAVENOUS at 19:19

## 2018-01-01 RX ADMIN — FUROSEMIDE 20 MG: 10 INJECTION, SOLUTION INTRAVENOUS at 10:23

## 2018-01-01 RX ADMIN — ACETAMINOPHEN 650 MG: 325 TABLET ORAL at 23:42

## 2018-01-01 RX ADMIN — LISINOPRIL 20 MG: 10 TABLET ORAL at 08:51

## 2018-01-01 RX ADMIN — METOPROLOL SUCCINATE 25 MG: 25 TABLET, EXTENDED RELEASE ORAL at 08:51

## 2018-01-01 RX ADMIN — LISINOPRIL 20 MG: 10 TABLET ORAL at 08:37

## 2018-01-01 RX ADMIN — TRAMADOL HYDROCHLORIDE 25 MG: 50 TABLET ORAL at 11:12

## 2018-01-01 RX ADMIN — OMEPRAZOLE 20 MG: 20 CAPSULE, DELAYED RELEASE ORAL at 08:51

## 2018-01-01 RX ADMIN — FUROSEMIDE 20 MG: 20 TABLET ORAL at 08:36

## 2018-01-01 RX ADMIN — TRAMADOL HYDROCHLORIDE 25 MG: 50 TABLET ORAL at 20:38

## 2018-01-01 RX ADMIN — OMEPRAZOLE 20 MG: 20 CAPSULE, DELAYED RELEASE ORAL at 08:38

## 2018-01-01 RX ADMIN — FUROSEMIDE 20 MG: 20 TABLET ORAL at 08:52

## 2018-01-01 RX ADMIN — POTASSIUM CHLORIDE 40 MEQ: 1500 TABLET, EXTENDED RELEASE ORAL at 09:55

## 2018-01-01 RX ADMIN — METOPROLOL SUCCINATE 25 MG: 25 TABLET, EXTENDED RELEASE ORAL at 08:38

## 2018-01-01 ASSESSMENT — ENCOUNTER SYMPTOMS
SHORTNESS OF BREATH: 1
FEVER: 0
SORE THROAT: 0
ABDOMINAL PAIN: 0
FREQUENCY: 0
HEARTBURN: 0
DIARRHEA: 1
EYE PAIN: 0
NAUSEA: 0
SHORTNESS OF BREATH: 0
APPETITE CHANGE: 0
COUGH: 0
HEADACHES: 0
ARTHRALGIAS: 0
FREQUENCY: 1
WEAKNESS: 1
DIZZINESS: 0
CONSTIPATION: 0

## 2018-01-01 ASSESSMENT — PAIN DESCRIPTION - DESCRIPTORS
DESCRIPTORS: CONSTANT;ACHING
DESCRIPTORS: SORE
DESCRIPTORS: SORE
DESCRIPTORS: THROBBING

## 2018-01-30 NOTE — TELEPHONE ENCOUNTER
"Requested Prescriptions   Pending Prescriptions Disp Refills     metoprolol succinate (TOPROL-XL) 25 MG 24 hr tablet [Pharmacy Med Name: METOPROLOL SUCCINATE ER 25MG TB24] 90 tablet 3    Last Written Prescription Date:  01/27/2017  #90 x 3  Last filled 10/30/2017  Last Office Visit with [facility] [place]:  01/27/2017 [facility]   Future Office Visit:   none   Sig: TAKE ONE TABLET BY MOUTH EVERY DAY    Beta-Blockers Protocol Failed    1/28/2018  6:42 AM       Failed - Blood pressure under 140/90    BP Readings from Last 3 Encounters:   01/27/17 112/60   01/13/17 106/46   03/22/16 125/65                Failed - Recent or future visit with authorizing provider's specialty    Patient had office visit in the last year or has a visit in the next 30 days with authorizing provider.  See \"Patient Info\" tab in inbasket, or \"Choose Columns\" in Meds & Orders section of the refill encounter.            Passed - Patient is age 6 or older        furosemide (LASIX) 40 MG tablet [Pharmacy Med Name: FUROSEMIDE 40MG TABS] 90 tablet 3    Last Written Prescription Date:  01/27/2017 #90 x 3  Last filled 10/31/2017  Last Office Visit with [facility] [place]:  01/27/2017 [facility]   Future Office Visit:   none   Sig: TAKE ONE TABLET BY MOUTH EVERY DAY    Diuretics (Including Combos) Protocol Failed    1/28/2018  6:42 AM       Failed - Blood pressure under 140/90    BP Readings from Last 3 Encounters:   01/27/17 112/60   01/13/17 106/46   03/22/16 125/65                Failed - Recent or future visit with authorizing provider's specialty    Patient had office visit in the last year or has a visit in the next 30 days with authorizing provider.  See \"Patient Info\" tab in inbasket, or \"Choose Columns\" in Meds & Orders section of the refill encounter.            Failed - Normal serum creatinine on file in past 12 months    Recent Labs   Lab Test 02/02/17   CR  1.16*             Failed - Normal serum sodium on file in past 12 months    Recent Labs   Lab " Test  01/27/17   1139   NA  140             Passed - Patient is age 18 or older       Passed - No active pregancy on record       Passed - Normal serum potassium on file in past 12 months    Recent Labs   Lab Test 02/02/17   POTASSIUM  4.6                   Passed - No positive pregnancy test in past 12 months

## 2018-01-30 NOTE — TELEPHONE ENCOUNTER
Prescription approved per [facility] Refill Protocol or patient Primary care provider (PCP)  VASU Spence RN/Angel Dill

## 2018-07-19 NOTE — TELEPHONE ENCOUNTER
"Requested Prescriptions   Pending Prescriptions Disp Refills     metoprolol succinate (TOPROL-XL) 25 MG 24 hr tablet [Pharmacy Med Name: METOPROLOL SUCCINATE ER 25MG TB24] 90 tablet 1    Last Written Prescription Date:  01/30/2018 #90 x 1  Last filled 05/01/2018  Last office visit: 1/27/2017 [facility]   Future Office Visit:  None   Sig: TAKE ONE TABLET BY MOUTH EVERY DAY    Beta-Blockers Protocol Failed    7/19/2018 12:03 PM       Failed - Blood pressure under 140/90 in past 12 months    BP Readings from Last 3 Encounters:   01/27/17 112/60   01/13/17 106/46   03/22/16 125/65                Failed - Recent (12 mo) or future (30 days) visit within the authorizing provider's specialty    Patient had office visit in the last 12 months or has a visit in the next 30 days with authorizing provider or within the authorizing provider's specialty.  See \"Patient Info\" tab in inbasket, or \"Choose Columns\" in Meds & Orders section of the refill encounter.           Passed - Patient is age 6 or older          "

## 2018-10-09 NOTE — PROGRESS NOTES
[facility]  PRIMARY CARE PROVIDER AND CLINIC:  [facility] [address] [facility]  Chief Complaint   Patient presents with     Hospital F/U     [place] Medical Record Number:  [id number]  Place of Service where encounter took place:  [facility] [[id number]]    HPI:    Kina Sears is a 89 year old  (6/9/1923),admitted to the above facility from  [facility].  Hospital stay 10/1/18 through 10/9/18.  Admitted to this facility for  rehab, medical management and nursing care.  HPI information obtained from: facility chart records, facility staff, patient report, [facility] chart review and Care Everywhere [facility] chart review.     [facility] SUMMARY:   Kina Sears is a 89 y.o. female with past medical history significant for polio with residual right-sided deficits, CHF, hypertension, GERD who presented to [facility] on 10/1/18 with left lower extremity swelling, erythema, and pain. Given the appearance of patient's left lower extremity and elevated leukocytosis to 20.5 on admission the likely cause of her symptoms is cellulitis. Left lower extremity ultrasound was negative for DVT. She was initially started on IV vancomycin and IV Zosyn in the emergency department and then narrowed to IV Ancef. She had slow improvement of her erythema and swelling with IV antibiotics; however on hospital day 4 after being switched to Keflex she had an increase in her leukocytosis.    Other sources of infection were considered and UA and urine culture were done due to having a Rico catheter placed on admission for urinary incontinence. Urine culture grew Pseudomonas sensitive to ciprofloxacin. The Rico catheter was removed and patient no longer had urinary retention, though did have a few bouts of incontinence. Until sensitivities returned patient continued on IV Zosyn. On 10/9/2018 sensitivities are returned showing Pseudomonas was  sensitive to ciprofloxacin.    On day of discharge she was sent out on Keflex 500 mg twice daily for the cellulitis to continue 3 days further for a 10-day course. Additionally she was discharged with 3 days further of a 5-day course of antibiotics with ciprofloxacin twice daily for her Pseudomonas UTI.    She continued to have intermittent throbbing pain throughout her hospitalization in that left lower extremity; however, patient's son stated this was an ongoing issue even prior to hospitalization that they have been working with. They are concerned she may need a higher level of care.    Patient had also sustained a mechanical fall onto her right side the morning of admission. She was ambulating in her apartment at her assisted living facility and her walker got away from her and she fell onto her right side. She did not hit her head and did not have loss of consciousness. Extensive imaging of the right side shows degenerative changes but was negative for fractures of the right lower extremity, pelvis, shoulder. CT had showed no evidence of intracranial bleed or trauma.    Urinary retention: Patient had issues with urinary retention during her hospitalization, likely related to her feeling unsafe to get out of bed and transfer to the commode due to pain in her left lower extremity from her cellulitis.     Acute kidney injury: Patient presented with creatinine of 1.17 from her baseline of 0.8-1.0. Likely related to dehydration/poor oral intake in the day prior to admission and her coronary retention. MAGDALENO resolved with IV fluids.    Pressure ulcers: 9 erythematous lesions were noted on patient's buttocks upon admission, which represented several stage II and unstageable pressure ulcers. She was treated with calmoseptine ointment and Aquaphor applied directly to these lesions with frequent position changes. This should be continued outpatient.    Unequal pupils: On admission it was noted that her right pupil was not  reactive to light, staying at about 4 mm. Patient denies vision symptoms or eye pain. Vision intact and equal bilaterally on exam. Does have a history of bilateral cataract surgery, so it is possible that this is a change related to that. Recommend follow-up evaluation with primary care provider.    History of hypertension: PTA lisinopril was initially held due to MAGDALENO, then due to hypotension. Restarted after these resolved.    History of heart failure with reduced ejection fraction: Echocardiogram 1/17/17 showed LVEF 25% with moderate mitral regurgitation. PTA Lasix was initially held due to MAGDALENO, then due to hypotension. Restarted after these resolved.     History of atrial fibrillation: Regular rate on examination. PTA Toprol-XL was continued for rate control.    History of GERD: PTA omeprazole was continued.    Hx of Gout: Started on allopurinol as an outpatient. Consider restarting this.    Incidental imaging findings: Outpatient workup for incidental imaging finding such as left thyroid nodule as well as femur bone lesion seen on x-ray should be considered. Discussed with patient utility of further workup.    Current issues are:         Impaired mobility and activities of daily living  Hypertension goal BP (blood pressure) < 140/90  Chronic systolic congestive heart failure (H)  Coronary artery disease involving native coronary artery of native heart without angina pectoris  Urinary incontinence, unspecified type  Gastroesophageal reflux disease without esophagitis  Urinary tract infection associated with indwelling urethral catheter, subsequent encounter  Cellulitis of left lower extremity    CODE STATUS/ADVANCE DIRECTIVES DISCUSSION:   DNR / DNI  Patient's living condition: lives in an assisted living facility    ALLERGIES:Sulfa drugs  PAST MEDICAL HISTORY:  has a past medical history of GENERAL OSTEOARTHROSIS (4/8/2005); High Blood Pressure (5/26/2005); 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  PAST SURGICAL HISTORY:  has a past surgical history that includes surgical history of - ; angioplasty (7/2006); and Eye surgery.  FAMILY HISTORY: family history includes Arthritis in her father and sister; Breast Cancer in her sister; C.A.D. in her brother and mother; Cancer - colorectal in her sister; Cerebrovascular Disease in her brother; Diabetes in her brother; Hypertension in her mother. There is no history of Prostate Cancer.  SOCIAL HISTORY:  reports that she has never smoked. She has never used smokeless tobacco. She reports that she does not drink alcohol or use illicit drugs.    Post Discharge Medication Reconciliation Status: discharge medications reconciled, continue medications without change.  Current Outpatient Prescriptions   Medication Sig Dispense Refill     acetaminophen (TYLENOL) 325 MG tablet Take 650mg orally every morning and evening.  Make 650mg additionally as needed for pain every 4 hours, do no exceed 3000mg in 24 hour period 100 tablet 11     Cholecalciferol (VITAMIN D) 2000 UNITS CAPS        ciprofloxacin (CIPRO) 500 MG tablet Take 1 tablet (500 mg) by mouth 2 times daily 14 tablet 0     ferrous sulfate (IRON) 325 (65 Fe) MG tablet Take 325 mg by mouth daily (with breakfast)       fluticasone (FLONASE) 50 MCG/ACT nasal spray Spray 2 sprays into both nostrils daily As needed for nasal congestion. 16 g 3     furosemide (LASIX) 40 MG tablet TAKE ONE TABLET BY MOUTH EVERY DAY 90 tablet 0     lisinopril (PRINIVIL/ZESTRIL) 20 MG tablet TAKE ONE TABLET BY MOUTH EVERY DAY 90 tablet 0     metoprolol succinate (TOPROL-XL) 25 MG 24 hr tablet TAKE ONE TABLET BY MOUTH EVERY DAY 90 tablet 1     nitroglycerin (NITROSTAT) 0.4 MG SL tablet Place 1 tablet (0.4 mg) under the tongue See Admin Instructions EVERY 5 MIN AS NEEDED, UP TO 3 PER EPISODE 90 tablet 0     nystatin (MYCOSTATIN) cream Apply topically 2 times daily       omeprazole  (PRILOSEC) 20 MG capsule Take 1 capsule (20 mg) by mouth daily 90 capsule 1     order for DME Equipment being ordered: 4 wheeled walker 1 Device 0       ROS:  10 point ROS of systems including Constitutional, Eyes, Respiratory, Cardiovascular, Gastroenterology, Genitourinary, Integumentary, Muscularskeletal, Psychiatric were all negative except for pertinent positives noted.    Exam:  /74  Pulse 73  Temp 96.3  F (35.7  C)  Resp 21  SpO2 99%  GENERAL APPEARANCE:  Alert, in no distress  RESP:  respiratory effort and palpation of chest normal, no respiratory distress, lungs sounds clear  CV:  Palpation and auscultation of heart done , regular rate and rhythm, no murmur, rub, or gallop, edema +2 pitting pedal edema of left  ABDOMEN:  normal bowel sounds, soft, nontender,   M/S:  Gait and station requires so for transfers, no tenderness or swelling of the joints   SKIN:  Inspection and palpation of skin and subcutaneous tissue at baseline - left leg dressing clean and intact. Lesions of leg and buttock not obs.  PSYCH:  insight and judgement, memory seems appropriate, affect and mood normal     Lab/Diagnostic data: Hospital labs reviewed.    ASSESSMENT/PLAN:  Impaired mobility and activities of daily living  Was in assisted living. Admitted to TCU following hospital stay. States therapy hurts - mostly her left leg.  - continue Physical Therapy / Ocupational Therapy   - goal of care is for her to maximize function and reside at the nursing home.     Hypertension goal BP (blood pressure) < 140/90  Chronic systolic congestive heart failure (H)  Hypotensive at hospital with sepsis. Currently on lisinopril, metorpolol and lasix  - check labs, follow vital signs, weights and signs of HF.     Coronary artery disease involving native coronary artery of native heart without angina pectoris  Hx of MI with stent to LAD. Denies chest pains.   - continue current cares    Urinary incontinence, unspecified type  Complains  of complete incontince.  - discontinue oxybutynin     Gastroesophageal reflux disease without esophagitis  No current complaint of heart burn  - continue prilosec    Urinary tract infection associated with indwelling urethral catheter, subsequent encounter  Complete abx    Cellulitis of left lower extremity  Complete abx       Orders:  1. Discharge Oxybutynin  2. Patient wants side rail   3. Check BMP and CBC on Friday dx: Cellulitis   4. discontinue Cholestyramine     Total time spent with patient visit was 35 min including patient visit and review of past records. Greater than 50% of total time spent with counseling and coordinating care due to diagnosis.    Electronically signed by:  Katie Calderon NP

## 2018-10-12 NOTE — PROGRESS NOTES
[facility]  PRIMARY CARE PROVIDER AND CLINIC:  Ayden [facility] [address] [facility]  Chief Complaint   Patient presents with     Nursing Home Acute     TCU Follow up     [place] Medical Record Number:  [id number]  [facility][id number]    HPI:    Kina Sears is a 89 year old  (6/9/1923),admitted to the above facility from  [facility].  Hospital stay 10/1/18 through 10/9/18.  Admitted to this facility for  rehab, medical management and nursing care.  HPI information obtained from: facility chart records, facility staff, patient report, [facility] Epic chart review and Care Everywhere Epic chart review.     Current issues are:         Skin ulcer of buttock, unspecified ulcer stage (H)  Chronic systolic congestive heart failure (H)  Impaired mobility and activities of daily living  Hypertension goal BP (blood pressure) < 140/90  Coronary artery disease involving native coronary artery of native heart without angina pectoris  Urinary incontinence, unspecified type  Gastroesophageal reflux disease without esophagitis  Urinary tract infection associated with indwelling urethral catheter, subsequent encounter  Cellulitis of left lower extremity    ALLERGIES:Sulfa drugs  PAST MEDICAL HISTORY:  has a past medical history of DNR no code (do not resuscitate) (10/14/2018); GENERAL OSTEOARTHROSIS (4/8/2005); High Blood Pressure (5/26/2005); Right knee DJD (4/5/2010); Shoulder arthritis (4/5/2010); and SPASTIC HEMIPLEGIA/PARES UNSP SIDE (5/26/2005).  PAST SURGICAL HISTORY:  has a past surgical history that includes surgical history of - ; angioplasty (7/2006); and Eye surgery.  FAMILY HISTORY: family history includes Arthritis in her father and sister; Breast Cancer in her sister; C.A.D. in her brother and mother; Cancer - colorectal in her sister; Cerebrovascular Disease in her brother; Diabetes in  her brother; Hypertension in her mother. There is no history of Prostate Cancer.  SOCIAL HISTORY:  reports that she has never smoked. She has never used smokeless tobacco. She reports that she does not drink alcohol or use illicit drugs.    Current Outpatient Prescriptions   Medication Sig Dispense Refill     Calcium Carb-Cholecalciferol 500-200 MG-UNIT TABS Take 1 tablet by mouth daily       ferrous sulfate (IRON) 325 (65 Fe) MG tablet Take 325 mg by mouth daily (with breakfast)       fluticasone (FLONASE) 50 MCG/ACT nasal spray Spray 2 sprays into both nostrils daily As needed for nasal congestion. 16 g 3     lisinopril (PRINIVIL/ZESTRIL) 20 MG tablet TAKE ONE TABLET BY MOUTH EVERY DAY 90 tablet 0     metoprolol succinate (TOPROL-XL) 25 MG 24 hr tablet TAKE ONE TABLET BY MOUTH EVERY DAY 90 tablet 1     nitroglycerin (NITROSTAT) 0.4 MG SL tablet Place 1 tablet (0.4 mg) under the tongue See Admin Instructions EVERY 5 MIN AS NEEDED, UP TO 3 PER EPISODE 90 tablet 0     omeprazole (PRILOSEC) 20 MG capsule Take 1 capsule (20 mg) by mouth daily 90 capsule 1     order for DME Equipment being ordered: 4 wheeled walker 1 Device 0     acetaminophen (TYLENOL) 325 MG tablet Take 2 tablets (650 mg) by mouth every 4 hours as needed for mild pain 100 tablet      furosemide (LASIX) 20 MG tablet Take 1 tablet (20 mg) by mouth daily At 0800 and 1/2 tablet (10 mg) by mouth by mouth at 1400 30 tablet 0     traMADol (ULTRAM) 50 MG tablet Take 0.5 tablets (25 mg) by mouth every 6 hours as needed for moderate pain 12 tablet 0       ROS:  4 point ROS including Respiratory, CV, GI and , other than that noted in the HPI,  is negative    Exam:  /72  Pulse 82  Temp 97.2  F (36.2  C)  Resp 24  Wt 138 lb 6.4 oz (62.8 kg)  SpO2 99%  BMI 27.95 kg/m2  GENERAL APPEARANCE:  Alert, in no distress  RESP:  respiratory effort and palpation of chest normal, no respiratory distress, lungs sounds exp wheeze  CV:  Palpation and auscultation of  heart done , regular rate and rhythm, no murmur, rub, or gallop, edema +2 pitting pedal edema of left  ABDOMEN:  normal bowel sounds, soft, nontender,   M/S:  Gait and station requires so for transfers, no tenderness or swelling of the joints   SKIN:  Inspection and palpation of skin and subcutaneous tissue at baseline - left leg dressing clean and intact. Lesions of leg not obs, lesion of buttock with several areas of ulceration. No signs of drainage or infection.   PSYCH:  insight and judgement, memory seems appropriate, affect and mood normal     Lab/Diagnostic data: Facility labs reviewed.    ASSESSMENT/PLAN:  Skin ulcer of buttock, unspecified ulcer stage (H)  Several areas of ulceration present on buttock.  pressure vs. Excoriation. Loose stool present. Currently has orders for mepilex but dressing not staying in place and protecting area from stool.  - add poop goop (equal parts, zinc oxides, nystatin, stoma paste)    Impaired mobility and activities of daily living  Was in assisted living. Admitted to TCU following hospital stay. States therapy hurts - mostly her left leg.  - continue Physical Therapy / Ocupational Therapy   - goal of care is for her to maximize function and reside at the nursing home.     Hypertension goal BP (blood pressure) < 140/90  Chronic systolic congestive heart failure (H)  Hypotensive at hospital with sepsis. Complains of increase in shortness of breath.  - give additional lasix     Coronary artery disease involving native coronary artery of native heart without angina pectoris  Hx of MI with stent to LAD. Denies chest pains.   - continue current cares    Urinary incontinence, unspecified type  Complains of complete incontince.  - discontinue oxybutynin     Gastroesophageal reflux disease without esophagitis  No current complaint of heart burn  - continue prilosec    Urinary tract infection associated with indwelling urethral catheter, subsequent encounter  Complete  abx    Cellulitis of left lower extremity  Complete abx       Orders:  1. Additional Lasix 20 mg po everyday x 2 day  2. Poop goop (equal parts stoma paste / zinc oxidine / nystatin cream) apply with diaper changes.     Electronically signed by:  Katie Calderon NP

## 2018-10-14 PROBLEM — L97.429 ULCER OF HEEL AND MIDFOOT, LEFT, WITH UNSPECIFIED SEVERITY (H): Status: ACTIVE | Noted: 2018-01-01

## 2018-10-14 PROBLEM — R33.9 URINE RETENTION: Status: ACTIVE | Noted: 2018-01-01

## 2018-10-14 PROBLEM — L97.921 ULCER OF LEFT LOWER EXTREMITY, LIMITED TO BREAKDOWN OF SKIN (H): Status: ACTIVE | Noted: 2018-01-01

## 2018-10-14 PROBLEM — Z87.448 HX OF ACUTE RENAL FAILURE: Status: ACTIVE | Noted: 2018-01-01

## 2018-10-14 PROBLEM — I50.9 CHF (CONGESTIVE HEART FAILURE) (H): Status: ACTIVE | Noted: 2018-01-01

## 2018-10-14 PROBLEM — Z66 DNR NO CODE (DO NOT RESUSCITATE): Status: ACTIVE | Noted: 2018-01-01

## 2018-10-14 PROBLEM — Z86.79 HISTORY OF ATRIAL FIBRILLATION: Status: ACTIVE | Noted: 2018-01-01

## 2018-10-14 PROBLEM — I50.20 SYSTOLIC HEART FAILURE (H): Status: ACTIVE | Noted: 2018-01-01

## 2018-10-14 PROBLEM — I50.23 ACUTE ON CHRONIC SYSTOLIC CONGESTIVE HEART FAILURE (H): Status: ACTIVE | Noted: 2018-01-01

## 2018-10-14 PROBLEM — Z87.39 HISTORY OF GOUT: Status: ACTIVE | Noted: 2018-01-01

## 2018-10-14 PROBLEM — Z87.440 PERSONAL HISTORY OF URINARY TRACT INFECTION: Status: ACTIVE | Noted: 2018-01-01

## 2018-10-14 PROBLEM — L89.159 DECUBITUS ULCER OF COCCYGEAL REGION: Status: ACTIVE | Noted: 2018-01-01

## 2018-10-14 NOTE — H&P
[facility]    History and Physical  Hospitalist       Date of Admission:  10/14/2018    Assessment & Plan   Kina Sears is a 89 year old female who presents with acute on chronic decompemnsated systolic heart failure with shortness of breath and left leg swelling that started last night and patient states she was unable to sleep. Patient was noted to be retaining urine in the ER and fluid overload was noted on her chest xray.       Principal Problem:    Acute on chronic systolic congestive heart failure (H)    Spoke to patient family, due to her decline and age patient and family are focused on symptom management and quality of life.Pt son, HCA, asking about palliative care and hospice  Patient with a recent complicated hospitalization, discharged from [facility] on 10/9/18    Systolic heart failure, acute on chronic. Patient with echo in 2017, EF 25%.   Due to goals of care will not order an updated Echocardiogram  Patient is DNR  No transfer to ICU or aggressive care  Patient has been on lasix, but with recent hospitalization this was held due to hypotension  BNP elevated this admission  Troponin negative  IV Lasix given in ER with good results  Plan to observe patient on telemetry, monitor oxygen saturations, blood pressure  Will need to adjust diuretics for possible discaharge home    Active Problems:      Hypertension goal BP (blood pressure) < 140/90  Chronic, stable. Patient currently normotensive. OP prescriptions Lisinopril and Metoprolol. Will resume pending clinical course and patient response to IV Lasix.       Decubitus ulcer of coccygeal region    Ulcer of left lower extremity, limited to breakdown of skin (H)    Ulcer of heel, left, with unspecified severity (H)  Noted on admission. Patient with recent hospitalization for cellulitis, skin ulcerations noted at that time. Skin breakdown documented. Heel protectors. Ordered [facility] nurse to evaluate and treat. Barrier cream to  bottom.       Personal history of urinary tract infection - pseudomonas  Patient recently completed a course of two antibiotics.   Will re culture urine to ensure resolution of the infection.       Urine retention  Noted on admission and on recent hospitalization. Patient was completed Keflex and Cipro on the 10/12 for an infection.  Patient was on Oxybutynin for 2 months prior to her presentation to the ER on 9/24 for urine retention.  Unknown dose and unknown plan at this time for continued treatment. Patient with current ramírez catheter in place. Will need to evaluate the need for this on discharge.       Hx of acute renal failure  Recent acute renal injury during last hospitalization. Will need to monitor.       History of atrial fibrillation  Patient with a history of A fib.Monitor with telemetry. EKG showed NS with L BBB.       History of gout  Patient was on Allopurinol at some point in the recent past, not at this time. Will need to monitor.       Hyperlipidemia LDL goal <100  Chronic, no treatment.       DNR no code (do not resuscitate)  Will honor.     Diarrhea:   Patient states she has been taking laxatives for constipation this week. Will monitor.     DVT Prophylaxis: Obs  Code Status: Full Code  Expected discharge: Tomorrow, recommended to prior living arrangement once hemodynamically stable.    Whitney Argueta, CNP    Primary Care Physician   Physician No Ref-Primary    Chief Complaint   Shortness of breath and edema    History is obtained from the patient, electronic health record, emergency department physician and patient's children    History of Present Illness   Kina Sears is a 89 year old female who presents with complaints of shortness of breath and left leg edema. Patient with a history of heart failure and a recent complicated hospitalization at [facility]. (see below) . Patient states she became short of breath last night and was not able to sleep. She was feeling more short of breath and  worried about her swelling and wanted to come to the emergency room.     Copied from discharge Summary from [facility].   Kina Sears is a 89 y.o. female with past medical history significant for polio with residual right-sided deficits, CHF, hypertension, GERD who presented to [facility] on 10/1/18 with left lower extremity swelling, erythema, and pain. Given the appearance of patient's left lower extremity and elevated leukocytosis to 20.5 on admission the likely cause of her symptoms is cellulitis. Left lower extremity ultrasound was negative for DVT. She was initially started on IV vancomycin and IV Zosyn in the emergency department and then narrowed to IV Ancef. She had slow improvement of her erythema and swelling with IV antibiotics; however on hospital day 4 after being switched to Keflex she had an increase in her leukocytosis.    Other sources of infection were considered and UA and urine culture were done due to having a Rico catheter placed on admission for urinary incontinence. Urine culture grew Pseudomonas sensitive to ciprofloxacin. The Rico catheter was removed and patient no longer had urinary retention, though did have a few bouts of incontinence. Until sensitivities returned patient continued on IV Zosyn. On 10/9/2018 sensitivities are returned showing Pseudomonas was sensitive to ciprofloxacin.    On day of discharge she was sent out on Keflex 500 mg twice daily for the cellulitis to continue 3 days further for a 10-day course. Additionally she was discharged with 3 days further of a 5-day course of antibiotics with ciprofloxacin twice daily for her Pseudomonas UTI.    She continued to have intermittent throbbing pain throughout her hospitalization in that left lower extremity; however, patient's son stated this was an ongoing issue even prior to hospitalization that they have been working with. They are concerned she may need a higher level of care.    Patient had also sustained a  mechanical fall onto her right side the morning of admission. She was ambulating in her apartment at her assisted living facility and her walker got away from her and she fell onto her right side. She did not hit her head and did not have loss of consciousness. Extensive imaging of the right side shows degenerative changes but was negative for fractures of the right lower extremity, pelvis, shoulder. CT had showed no evidence of intracranial bleed or trauma.    Urinary retention: Patient had issues with urinary retention during her hospitalization, likely related to her feeling unsafe to get out of bed and transfer to the commode due to pain in her left lower extremity from her cellulitis.     Acute kidney injury: Patient presented with creatinine of 1.17 from her baseline of 0.8-1.0. Likely related to dehydration/poor oral intake in the day prior to admission and her coronary retention. MAGDALENO resolved with IV fluids.    Pressure ulcers: 9 erythematous lesions were noted on patient's buttocks upon admission, which represented several stage II and unstageable pressure ulcers. She was treated with calmoseptine ointment and Aquaphor applied directly to these lesions with frequent position changes. This should be continued outpatient.    Unequal pupils: On admission it was noted that her right pupil was not reactive to light, staying at about 4 mm. Patient denies vision symptoms or eye pain. Vision intact and equal bilaterally on exam. Does have a history of bilateral cataract surgery, so it is possible that this is a change related to that. Recommend follow-up evaluation with primary care provider.    History of hypertension: PTA lisinopril was initially held due to MAGDALENO, then due to hypotension. Restarted after these resolved.    History of heart failure with reduced ejection fraction: Echocardiogram 1/17/17 showed LVEF 25% with moderate mitral regurgitation. PTA Lasix was initially held due to MAGDALENO, then due to  hypotension. Restarted after these resolved.     History of atrial fibrillation: Regular rate on examination. PTA Toprol-XL was continued for rate control.    History of GERD: PTA omeprazole was continued.    Hx of Gout: Started on allopurinol as an outpatient. Consider restarting this.    Incidental imaging findings: Outpatient workup for incidental imaging finding such as left thyroid nodule as well as femur bone lesion seen on x-ray should be considered. Discussed with patient utility of further workup.       Past Medical History    I have reviewed this patient's medical history and updated it with pertinent information if needed.   Past Medical History:   Diagnosis Date     GENERAL OSTEOARTHROSIS 4/8/2005 April 8, 2005 - injection rt knee.     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     Discharge Diagnoses from [facility] 10/14:   Cellulitis of the left lower extremity  Catheter associated UTI growing Pseudomonas sensitive to ciprofloxacin  Mechanical Fall  Iron deficiency anemia, unspecified iron deficiency anemia type  MAGDALENO (acute kidney injury) (H)  Incidental left thyroid nodule.  Femur bone lesion  Urinary incontinence  Sacral pressure ulcers  History of hypertension  History of CHF  History of GERD  History of Gout      Past Surgical History   I have reviewed this patient's surgical history and updated it with pertinent information if needed.  Past Surgical History:   Procedure Laterality Date     ANGIOPLASTY  7/2006    LAD Coronary artery     EYE SURGERY      cataract     SURGICAL HISTORY OF -       Cholecystectomy       Prior to Admission Medications   Prior to Admission Medications   Prescriptions Last Dose Informant Patient Reported? Taking?   Acetaminophen (TYLENOL PO) 10/13/2018 at 2030  Yes Yes   Sig: Take 500  mg by mouth 4 times daily   Calcium Carb-Cholecalciferol 500-200 MG-UNIT TABS 10/13/2018 at 1600  Yes Yes   Sig: Take 1 tablet by mouth daily   Furosemide (LASIX PO) 10/14/2018 at 0800  Yes Yes   Sig: Take 20 mg by mouth daily   ferrous sulfate (IRON) 325 (65 Fe) MG tablet 10/13/2018 at 0800  Yes Yes   Sig: Take 325 mg by mouth daily (with breakfast)   fluticasone (FLONASE) 50 MCG/ACT nasal spray Unknown at Unknown time  No No   Sig: Spray 2 sprays into both nostrils daily As needed for nasal congestion.   lisinopril (PRINIVIL/ZESTRIL) 20 MG tablet 10/14/2018 at 0800  No Yes   Sig: TAKE ONE TABLET BY MOUTH EVERY DAY   metoprolol succinate (TOPROL-XL) 25 MG 24 hr tablet 10/14/2018 at 0800  No Yes   Sig: TAKE ONE TABLET BY MOUTH EVERY DAY   nitroglycerin (NITROSTAT) 0.4 MG SL tablet Unknown at Unknown time  No No   Sig: Place 1 tablet (0.4 mg) under the tongue See Admin Instructions EVERY 5 MIN AS NEEDED, UP TO 3 PER EPISODE   nystatin (MYCOSTATIN) 175450 UNIT/GM POWD Unknown at Unknown time  Yes No   Sig: Apply topically daily as needed   omeprazole (PRILOSEC) 20 MG capsule 10/13/2018 at 0730  No Yes   Sig: Take 1 capsule (20 mg) by mouth daily   order for DME   No No   Sig: Equipment being ordered: 4 wheeled walker      Facility-Administered Medications: None     Allergies   Allergies   Allergen Reactions     Sulfa Drugs Rash       Social History   I have reviewed this patient's social history and updated it with pertinent information if needed. Kina Sears  reports that she has never smoked. She has never used smokeless tobacco. She reports that she does not drink alcohol or use illicit drugs.    Patient was residing in [facility] until the last hospitalization. Patient was discharged to [facility] with the plan to transition to long term care with palliative care or hospice.     Family History   I have reviewed this patient's family history and updated it with pertinent information if needed.    Family History   Problem Relation Age of Onset     C.A.D. Mother      Hypertension Mother      Arthritis Father      Rheumatoid     Arthritis Sister      Cancer - colorectal Sister      Breast Cancer Sister      C.A.D. Brother      Diabetes Brother      Cerebrovascular Disease Brother      Prostate Cancer No family hx of        Review of Systems   The 10 point Review of Systems is negative other than noted in the HPI or here.     Physical Exam   Temp: 97.9  F (36.6  C) Temp src: Oral BP: 161/65 Pulse: 75 Heart Rate: 75 Resp: 24 SpO2: 97 % O2 Device: None (Room air)    Vital Signs with Ranges  Temp:  [97.1  F (36.2  C)-97.9  F (36.6  C)] 97.9  F (36.6  C)  Pulse:  [75-90] 75  Heart Rate:  [75-93] 75  Resp:  [20-24] 24  BP: (101-161)/(50-83) 161/65  SpO2:  [92 %-97 %] 97 %  129 lbs 13.62 oz    Data   Data reviewed today:  I personally reviewed EKG, C X-ray,    Recent Labs  Lab 10/14/18  0823   WBC 10.1   HGB 9.6*   MCV 89         POTASSIUM 3.6   CHLORIDE 108   CO2 21   BUN 12   CR 0.62   ANIONGAP 11   ANNA 8.1*   *   ALBUMIN 2.2*   PROTTOTAL 6.2*   BILITOTAL 0.2   ALKPHOS 87   ALT 14   AST 19   TROPI 0.038       Recent Results (from the past 24 hour(s))   XR Chest 2 Views    Narrative    XR CHEST 2 VW   10/14/2018 10:42 AM     HISTORY: Shortness of breath;     COMPARISON: Film dated 2/13/2016    FINDINGS: Heart is mildly enlarged. There are bilateral pleural  effusions. There are associated bibasilar opacities compatible with  infiltrate or atelectasis.. Mild pulmonary congestion.  The bones and  soft tissues are unremarkable.      Impression    IMPRESSION: Cardiomegaly, bilateral pleural effusions and associated  basilar infiltrate or atelectasis. These findings could be due to  congestive heart failure.        MARIAH QUEZADA MD           Review of Systems   Respiratory: Positive for shortness of breath.    Gastrointestinal: Positive for diarrhea.   Genitourinary: Positive for frequency.   Skin:  Positive for itching and rash.   Neurological: Positive for weakness.   All other systems reviewed and are negative.      Physical Exam   Constitutional: She appears well-developed.   HENT:   Head: Normocephalic. Head is with abrasion.       Eyes: Pupils are equal, round, and reactive to light.   Neck: No JVD present.   Cardiovascular: Normal rate, regular rhythm, S1 normal, S2 normal and intact distal pulses.    Pulmonary/Chest: Effort normal. No stridor. She has decreased breath sounds in the right lower field and the left lower field.   Abdominal: Soft. Bowel sounds are normal. There is no tenderness.   Genitourinary:   Genitourinary Comments: Redness surrounding vitaly area and rectal area   Musculoskeletal: She exhibits edema, tenderness and deformity.        Right shoulder: She exhibits swelling and deformity.   Arthritic joint changes   Neurological: She is alert.   Anxious and forgetful   Skin: Skin is warm and dry. Bruising and ecchymosis noted. There is erythema. There is pallor.        Psychiatric: Her mood appears anxious. Cognition and memory are impaired.   Nursing note and vitals reviewed.    Pt.presented with many open wounds on her left lateral,posterior and anterior leg area. Note wound measurements as ordered.   The 3 open areas on anterior left leg measure 1/4 cm diam.   Left outer lateral leg open wound is 1/2cm.  2 open areas left posterior leg below his left knee 1 measures 3cm length and 1 cm width.  2nd one in that area is approx 1/2 cm diam.   Open areas to buttock region starting in the right buttock the one that is most distal is 2cm length by 3 cm width the one in the coccyx is 3cm length by 1.5cm width.  Now left buttock the area starting most distal are 2 open ones 1/2 cm diam with the one most inner to buttock crease to measure 3 cm length by 1cm width.  Open areas which we measured to left buttock area most proximal  measuring 3 cm by 1cm the one most distal left buttock measures 1  cm.  Both of patient posterior heels have non-blanchable areas with suspected deep tissue injuries.  Left heel measures 1.5 cm and skin is peeling on heel and pts.left lower leg area.  The slightly bluish non-blanchable area to right posterior heel measures 1cm.  She has scabbed area below left nostril and bruises to left upper arm area.  Her right foot is slightly cooler than left foot and 3+ pitting edema.  Right foot 4+ pitting edema and warm.  Able to palpate dorsal pedal pulses bilaterally.  Ordered Barrier cream to be applied to buttock area.

## 2018-10-14 NOTE — IP AVS SNAPSHOT
[address] [facility]    [address] [facility]     [place] [address]    Phone:  [phone]                                       After Visit Summary   10/14/2018    Kina Sears    MRN: [id number]           After Visit Summary Signature Page     I have received my discharge instructions, and my questions have been answered. I have discussed any challenges I see with this plan with the nurse or doctor.    ..........................................................................................................................................  Patient/Patient Representative Signature      ..........................................................................................................................................  Patient Representative Print Name and Relationship to Patient    ..................................................               ................................................  Date                                   Time    ..........................................................................................................................................  Reviewed by Signature/Title    ...................................................              ..............................................  Date                                               Time          22EPIC Rev 08/18

## 2018-10-14 NOTE — ED TRIAGE NOTES
She has been short of breath all night and says she did not sleep well beacause of it.  She has a history of CHF and a-fib and has noticed he L leg swelling.

## 2018-10-14 NOTE — IP AVS SNAPSHOT
MRN:[id number]                      After Visit Summary   10/14/2018    Kina Sears    MRN: [id number]           Thank you!     Thank you for choosing [place] for your care. Our goal is always to provide you with excellent care. Hearing back from our patients is one way we can continue to improve our services. Please take a few minutes to complete the written survey that you may receive in the mail after you visit with us. Thank you!        Patient Information     Date Of Birth          6/9/1923        About your hospital stay     You were admitted on:  October 14, 2018 You last received care in the:  [address] [facility]    You were discharged on:  October 16, 2018        Reason for your hospital stay       Shortness of breath with increased fluid found on your lungs and legs - you have responded well to the extra Lasix given during this hospital stay and will be going home with a slight increase in your lasix.  Also, you were struggling with pain and this has improved with the addition to Ultram to your Tylenol.  You do have several wound ulcers and have seen a general surgeon during this stay and will continue to get wound care the the TCU as you return.                  Who to Call     For medical emergencies, please call 911.  For non-urgent questions about your medical care, please call your primary care provider or clinic, None          Attending Provider     Provider Specialty    Kourtney Cano MD Emergency Medicine    Ronald Nazario MD Internal Medicine       Primary Care Provider Fax #    Physician No Ref-Primary [phone]      After Care Instructions     Activity - Up with nursing assistance           Advance Diet as Tolerated       Follow this diet upon discharge: Regular            Daily weights       Call Provider for weight gain of more than 2 pounds per day or 5 pounds per week.            Rico catheter       To straight gravity drainage.  Change catheter every 2 weeks and PRN for leaking or decreased uring output with signs of bladder distention. DO NOT change catheter without a specific MD order IF diagnosis of benign prostatic hypertrophy (BPH), neurogenic bladder, or other urological conditions            General info for SNF       Length of Stay Estimate: Short Term Care: Estimated # of Days <30  Condition at Discharge: Improving  Level of care:skilled   Rehabilitation Potential: Fair  Admission H&P remains valid and up-to-date: Yes  Recent Chemotherapy: N/A  Use Nursing Home Standing Orders: Yes            Intake and output       Every shift            Mantoux instructions       Give two-step Mantoux (PPD) Per Facility Policy Yes            Wound care       Site:   Left leg and foot ulceration, bilateral buttock ulcerations   Instructions:  Apply Mepilex to all wounds.  Change every 3 days and prn before then if Mepilex disturbed.                  Follow-up Appointments     Follow Up and recommended labs and tests       Follow up with jail physician.  The following labs/tests are recommended: BMP in one week.  Follow up with urology within 2 weeks to discuss timing of removing the Rico catheter.  Follow up with wound care specialist as scheduled                  Your next 10 appointments already scheduled     Oct 24, 2018  8:45 AM CDT   New Visit with Con[facility] Quach MD   [facility]    [address] [facility] [address] [phone]              Additional Services     Occupational Therapy Adult Consult       Evaluate and treat as clinically indicated.    Reason:  Generalized weakness, recent UTI and fall at home, CHF exacerbation, skin ulcerations            Physical Therapy Adult Consult       Evaluate and treat as clinically indicated.    Reason:  Generalized weakness, recent UTI and fall at home, CHF exacerbation, skin ulcerations            WOUND CARE REFERRAL       Your  "provider has referred you to: [employer]: [facility] [phone]  [email or web address]    Reason for referral: Wound care      1. [facility] Wound Consult appointment is related to what kind of wound: chronic skin wound    2. Location of wound: Buttocks and left calf, left foot, left heel, right heel    3. Reason for referral: Assess and treat as indicated    4. Desired treatment if any: Per [facility] nurse     Please be aware that coverage of these services is subject to the terms and limitations of your health insurance plan.  Call member services at your health plan with any benefit or coverage questions.      Please bring the following with you to your appointment:    (1) Any X-Rays, CTs or MRIs which have been performed.  Contact the facility where they were done to arrange for  prior to your scheduled appointment.    (2) List of current medications   (3) This referral request   (4) Any documents/labs given to you for this referral                  Pending Results     No orders found from 10/12/2018 to 10/15/2018.            Statement of Approval     Ordered          10/16/18 1412  I have reviewed and agree with all the recommendations and orders detailed in this document.  EFFECTIVE NOW     Approved and electronically signed by:  Wendy Martinez MD             Admission Information     Date & Time Provider Department Dept. Phone    10/14/2018 Ronald Nazario MD [address] [facility] [phone]      Your Vitals Were     Blood Pressure Pulse Temperature Respirations Height Weight    136/44 (BP Location: Left arm) 87 98.9  F (37.2  C) (Oral) 23 1.499 m (4' 11\") 56.9 kg (125 lb 7.1 oz)    Pulse Oximetry BMI (Body Mass Index)                90% 25.34 kg/m2          MyChart Information     [employer] gives you secure access to your electronic health record. If you see a primary care provider, you can also send messages to " your care team and make appointments. If you have questions, please call your primary care clinic.  If you do not have a primary care provider, please call [phone] and they will assist you.        Care EveryWhere ID     This is your Care EveryWhere ID. This could be used by other organizations to access your [place] medical records  [id number]        Equal Access to Services     NATHALIE SMITH : Hadeleni theodore Sodaniel, waaxda luqianadaha, qaybta kaalmayolanda page, dallas clark . [facility] [phone].    ATENCIÓN: Si habla español, tiene a delgado disposición servicios gratuitos de asistencia lingüística. Smith[place] al [phone].    We comply with applicable federal civil rights laws and [place] laws. We do not discriminate on the basis of race, color, national origin, age, disability, sex, sexual orientation, or gender identity.               Review of your medicines      START taking        Dose / Directions    traMADol 50 MG tablet   Commonly known as:  ULTRAM        Dose:  25 mg   Take 0.5 tablets (25 mg) by mouth every 6 hours as needed for moderate pain   Quantity:  12 tablet   Refills:  0         CONTINUE these medicines which may have CHANGED, or have new prescriptions. If we are uncertain of the size of tablets/capsules you have at home, strength may be listed as something that might have changed.        Dose / Directions    acetaminophen 325 MG tablet   Commonly known as:  TYLENOL   This may have changed:    - medication strength  - how much to take  - when to take this  - reasons to take this        Dose:  650 mg   Take 2 tablets (650 mg) by mouth every 4 hours as needed for mild pain   Quantity:  100 tablet   Refills:  0       furosemide 20 MG tablet   Commonly known as:  LASIX   This may have changed:  additional instructions   Used for:  Acute congestive heart failure, unspecified heart failure type (H)        Dose:  20 mg   Take 1 tablet (20 mg) by mouth daily At 0800  and 1/2 tablet (10 mg) by mouth by mouth at 1400   Quantity:  30 tablet   Refills:  0         CONTINUE these medicines which have NOT CHANGED        Dose / Directions    Calcium Carb-Cholecalciferol 500-200 MG-UNIT Tabs        Dose:  1 tablet   Take 1 tablet by mouth daily   Refills:  0       ferrous sulfate 325 (65 Fe) MG tablet   Commonly known as:  IRON        Dose:  325 mg   Take 325 mg by mouth daily (with breakfast)   Refills:  0       fluticasone 50 MCG/ACT spray   Commonly known as:  FLONASE   Used for:  Sinus congestion        Dose:  2 spray   Spray 2 sprays into both nostrils daily As needed for nasal congestion.   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)        TAKE ONE TABLET BY MOUTH EVERY DAY   Quantity:  90 tablet   Refills:  1       nitroGLYcerin 0.4 MG sublingual tablet   Commonly known as:  NITROSTAT   Used for:  H/O angina pectoris        Dose:  0.4 mg   Place 1 tablet (0.4 mg) under the tongue See Admin Instructions EVERY 5 MIN AS NEEDED, UP TO 3 PER EPISODE   Quantity:  90 tablet   Refills:  0       omeprazole 20 MG CR capsule   Commonly known as:  priLOSEC   Used for:  Gastroesophageal reflux disease, esophagitis presence not specified        Dose:  20 mg   Take 1 capsule (20 mg) by mouth daily   Quantity:  90 capsule   Refills:  1       order for DME   Used for:  Falls frequently, Primary osteoarthritis of right knee, Spastic hemiplegia (H)        Equipment being ordered: 4 wheeled walker   Quantity:  1 Device   Refills:  0         STOP taking     nystatin 148837 UNIT/GM Powd   Commonly known as:  MYCOSTATIN                 Where to get your medicines      Some of these will need a paper prescription and others can be bought over the counter. Ask your nurse if you have questions.     Bring a paper prescription for each of these medications     traMADol 50 MG tablet       You don't need a prescription for these medications     acetaminophen 325 MG tablet    furosemide 20 MG tablet                Protect others around you: Learn how to safely use, store and throw away your medicines at www.disposemymeds.org.        Information about OPIOIDS     PRESCRIPTION OPIOIDS: WHAT YOU NEED TO KNOW   We gave you an opioid (narcotic) pain medicine. It is important to manage your pain, but opioids are not always the best choice. You should first try all the other options your care team gave you. Take this medicine for as short a time (and as few doses) as possible.    Some activities can increase your pain, such as bandage changes or therapy sessions. It may help to take your pain medicine 30 to 60 minutes before these activities. Reduce your stress by getting enough sleep, working on hobbies you enjoy and practicing relaxation or meditation. Talk to your care team about ways to manage your pain beyond prescription opioids.    These medicines have risks:    DO NOT drive when on new or higher doses of pain medicine. These medicines can affect your alertness and reaction times, and you could be arrested for driving under the influence (DUI). If you need to use opioids long-term, talk to your care team about driving.    DO NOT operate heavy machinery    DO NOT do any other dangerous activities while taking these medicines.    DO NOT drink any alcohol while taking these medicines.     If the opioid prescribed includes acetaminophen, DO NOT take with any other medicines that contain acetaminophen. Read all labels carefully. Look for the word  acetaminophen  or  Tylenol.  Ask your pharmacist if you have questions or are unsure.    You can get  addicted to pain medicines, especially if you have a history of addiction (chemical, alcohol or substance dependence). Talk to your care team about ways to reduce this risk.    All opioids tend to cause constipation. Drink plenty of water and eat foods that have a lot of fiber, such as fruits, vegetables, prune juice, apple juice and high-fiber cereal. Take a laxative (Miralax, milk of magnesia, Colace, Senna) if you don t move your bowels at least every other day. Other side effects include upset stomach, sleepiness, dizziness, throwing up, tolerance (needing more of the medicine to have the same effect), physical dependence and slowed breathing.    Store your pills in a secure place, locked if possible. We will not replace any lost or stolen medicine. If you don t finish your medicine, please throw away (dispose) as directed by your pharmacist. The [place] [employer]ution [employer] has more information about safe disposal: https://[email or web address].[facility].[place]/living-green/managing-unwanted-medications             Medication List: This is a list of all your medications and when to take them. Check marks below indicate your daily home schedule. Keep this list as a reference.      Medications           Morning Afternoon Evening Bedtime As Needed    acetaminophen 325 MG tablet   Commonly known as:  TYLENOL   Take 2 tablets (650 mg) by mouth every 4 hours as needed for mild pain   Last time this was given:  650 mg on 10/16/2018  8:04 AM                                Calcium Carb-Cholecalciferol 500-200 MG-UNIT Tabs   Take 1 tablet by mouth daily                                ferrous sulfate 325 (65 Fe) MG tablet   Commonly known as:  IRON   Take 325 mg by mouth daily (with breakfast)                                fluticasone 50 MCG/ACT spray   Commonly known as:  FLONASE   Spray 2 sprays into both nostrils daily As needed for nasal congestion.                                furosemide 20 MG tablet   Commonly known as:   LASIX   Take 1 tablet (20 mg) by mouth daily At 0800 and 1/2 tablet (10 mg) by mouth by mouth at 1400   Last time this was given:  20 mg on 10/16/2018  8:36 AM                                lisinopril 20 MG tablet   Commonly known as:  PRINIVIL/ZESTRIL   TAKE ONE TABLET BY MOUTH EVERY DAY   Last time this was given:  20 mg on 10/16/2018  8:37 AM                                metoprolol succinate 25 MG 24 hr tablet   Commonly known as:  TOPROL-XL   TAKE ONE TABLET BY MOUTH EVERY DAY   Last time this was given:  25 mg on 10/16/2018  8:38 AM                                nitroGLYcerin 0.4 MG sublingual tablet   Commonly known as:  NITROSTAT   Place 1 tablet (0.4 mg) under the tongue See Admin Instructions EVERY 5 MIN AS NEEDED, UP TO 3 PER EPISODE                                omeprazole 20 MG CR capsule   Commonly known as:  priLOSEC   Take 1 capsule (20 mg) by mouth daily   Last time this was given:  20 mg on 10/16/2018  8:38 AM                                order for DME   Equipment being ordered: 4 wheeled walker                                traMADol 50 MG tablet   Commonly known as:  ULTRAM   Take 0.5 tablets (25 mg) by mouth every 6 hours as needed for moderate pain   Last time this was given:  25 mg on 10/16/2018 11:12 AM

## 2018-10-14 NOTE — IP AVS SNAPSHOT
"[address] MEDICAL SURGICAL: [phone]                                              INTERAGENCY TRANSFER FORM - PHYSICIAN ORDERS   10/14/2018                    Hospital Admission Date: 10/14/2018  AMAYA MCCLELLAND   : 1923  Sex: Female        Attending Provider: Ronald Nazario MD     Allergies:  Sulfa Drugs    Infection:  None   Service:  HOSPITALIST    Ht:  1.499 m (4' 11\")   Wt:  56.9 kg (125 lb 7.1 oz)   Admission Wt:  66.7 kg (147 lb)    BMI:  25.34 kg/m 2   BSA:  1.54 m 2            Patient PCP Information     Provider PCP Type    Physician No Ref-Primary General      ED Clinical Impression     Diagnosis Description Comment Added By Time Added    Acute congestive heart failure, unspecified heart failure type (H) [I50.9] Acute congestive heart failure, unspecified heart failure type (H) [I50.9]  Kourtney Cano MD 10/14/2018 12:13 PM      Hospital Problems as of 10/16/2018              Priority Class Noted POA    Hyperlipidemia LDL goal <100 Medium  10/31/2010 Yes    Hypertension goal BP (blood pressure) < 140/90 Medium  2010 Yes    * (Principal)Acute on chronic systolic congestive heart failure (H) Medium  10/14/2018 Yes    Decubitus ulcer of coccygeal region Medium  10/14/2018 Yes    Ulcer of left lower extremity, limited to breakdown of skin (H) Medium  10/14/2018 Yes    Ulcer of heel, left, with unspecified severity (H) Medium  10/14/2018 Yes    Systolic heart failure (H) Medium  10/14/2018 Yes    DNR no code (do not resuscitate) Medium  10/14/2018 Yes    Personal history of urinary tract infection - pseudomonas Medium  10/14/2018 Yes    Urine retention Medium  10/14/2018 Yes    Hx of acute renal failure Medium  10/14/2018 Yes    History of atrial fibrillation Medium  10/14/2018 Yes    History of gout Medium  10/14/2018 Yes      Non-Hospital Problems as of 10/16/2018              Priority Class Noted    OSTEOARTHRITIS Medium  2005    Spastic hemiplegia (H) Medium "  5/26/2005    Abnormal blood chemistry Medium  3/30/2006    Acute myocardial infarction (H) Medium  7/17/2006    Arteritis (H) Medium  7/18/2006    [facility] Medium  8/22/2006    DERMATITIS Medium  3/30/2007    Right knee DJD Medium  4/5/2010    Shoulder arthritis Medium  4/5/2010    Health Care Home Low  12/13/2011    Advanced directives, Existing AD rec 7/18/2012 notorized Low  7/18/2012    GI bleed Medium  3/20/2013    Anemia Medium  3/20/2013    Atrophy of vulva Medium  4/15/2014    Falls frequently Medium  12/9/2014    Pneumonia of right upper lobe due to infectious organism (H) Medium  2/11/2016    BPPV (benign paroxysmal positional vertigo) Medium  2/26/2016    Acute on chronic combined systolic and diastolic CHF (congestive heart failure) (H) Medium  1/27/2017      Code Status History     Date Active Date Inactive Code Status Order ID Comments User Context    2/11/2016 11:51 AM 2/16/2016  5:27 PM DNR/DNI [id number]  Jing Hassan APRN CNP Inpatient    12/9/2014  8:48 AM 12/10/2014  6:35 PM DNR/DNI [id number]  Jorge Sepulveda PA-C Inpatient    12/9/2014  1:51 AM 12/9/2014  8:48 AM DNR [id number]  Santosh Dawn,  Inpatient    7/18/2012  3:03 PM 12/9/2014  1:51 AM DNR [id number] See Health care directive dated and notarized 7/18/2012 Sirisha Christensen NP Outpatient         Medication Review      START taking        Dose / Directions Comments    traMADol 50 MG tablet   Commonly known as:  ULTRAM        Dose:  25 mg   Take 0.5 tablets (25 mg) by mouth every 6 hours as needed for moderate pain   Quantity:  12 tablet   Refills:  0          CONTINUE these medications which may have CHANGED, or have new prescriptions. If we are uncertain of the size of tablets/capsules you have at home, strength may be listed as something that might have changed.        Dose / Directions Comments    acetaminophen 325 MG tablet   Commonly known as:  TYLENOL   This may have changed:    -  medication strength  - how much to take  - when to take this  - reasons to take this        Dose:  650 mg   Take 2 tablets (650 mg) by mouth every 4 hours as needed for mild pain   Quantity:  100 tablet   Refills:  0        furosemide 20 MG tablet   Commonly known as:  LASIX   This may have changed:  additional instructions   Used for:  Acute congestive heart failure, unspecified heart failure type (H)        Dose:  20 mg   Take 1 tablet (20 mg) by mouth daily At 0800 and 1/2 tablet (10 mg) by mouth by mouth at 1400   Quantity:  30 tablet   Refills:  0          CONTINUE these medications which have NOT CHANGED        Dose / Directions Comments    Calcium Carb-Cholecalciferol 500-200 MG-UNIT Tabs        Dose:  1 tablet   Take 1 tablet by mouth daily   Refills:  0        ferrous sulfate 325 (65 Fe) MG tablet   Commonly known as:  IRON        Dose:  325 mg   Take 325 mg by mouth daily (with breakfast)   Refills:  0        fluticasone 50 MCG/ACT spray   Commonly known as:  FLONASE   Used for:  Sinus congestion        Dose:  2 spray   Spray 2 sprays into both nostrils daily As needed for nasal congestion.   Quantity:  16 g   Refills:  3        lisinopril 20 MG tablet   Commonly known as:  PRINIVIL/ZESTRIL   Used for:  Essential hypertension with goal blood pressure less than 140/90        TAKE ONE TABLET BY MOUTH EVERY DAY   Quantity:  90 tablet   Refills:  0        metoprolol succinate 25 MG 24 hr tablet   Commonly known as:  TOPROL-XL   Used for:  Acute on chronic combined systolic and diastolic congestive heart failure (H)        TAKE ONE TABLET BY MOUTH EVERY DAY   Quantity:  90 tablet   Refills:  1        nitroGLYcerin 0.4 MG sublingual tablet   Commonly known as:  NITROSTAT   Used for:  H/O angina pectoris        Dose:  0.4 mg   Place 1 tablet (0.4 mg) under the tongue See Admin Instructions EVERY 5 MIN AS NEEDED, UP TO 3 PER EPISODE   Quantity:  90 tablet   Refills:  0        omeprazole 20 MG CR capsule   Commonly  known as:  priLOSEC   Used for:  Gastroesophageal reflux disease, esophagitis presence not specified        Dose:  20 mg   Take 1 capsule (20 mg) by mouth daily   Quantity:  90 capsule   Refills:  1        order for DME   Used for:  Falls frequently, Primary osteoarthritis of right knee, Spastic hemiplegia (H)        Equipment being ordered: 4 wheeled walker   Quantity:  1 Device   Refills:  0          STOP taking     nystatin [id number] UNIT/GM Powd   Commonly known as:  MYCOSTATIN                   Summary of Visit     Reason for your hospital stay       Shortness of breath with increased fluid found on your lungs and legs - you have responded well to the extra Lasix given during this hospital stay and will be going home with a slight increase in your lasix.  Also, you were struggling with pain and this has improved with the addition to Ultram to your Tylenol.  You do have several wound ulcers and have seen a general surgeon during this stay and will continue to get wound care the the TCU as you return.             After Care     Activity - Up with nursing assistance           Advance Diet as Tolerated       Follow this diet upon discharge: Regular       Daily weights       Call Provider for weight gain of more than 2 pounds per day or 5 pounds per week.       Rico catheter       To straight gravity drainage. Change catheter every 2 weeks and PRN for leaking or decreased uring output with signs of bladder distention. DO NOT change catheter without a specific MD order IF diagnosis of benign prostatic hypertrophy (BPH), neurogenic bladder, or other urological conditions       General info for SNF       Length of Stay Estimate: Short Term Care: Estimated # of Days <30  Condition at Discharge: Improving  Level of care:skilled   Rehabilitation Potential: Fair  Admission H&P remains valid and up-to-date: Yes  Recent Chemotherapy: N/A  Use Nursing Home Standing Orders: Yes       Intake and output       Every shift        Mantoux instructions       Give two-step Mantoux (PPD) Per Facility Policy Yes       Wound care       Site:   Left leg and foot ulceration, bilateral buttock ulcerations   Instructions:  Apply Mepilex to all wounds.  Change every 3 days and prn before then if Mepilex disturbed.             Referrals     Occupational Therapy Adult Consult       Evaluate and treat as clinically indicated.    Reason:  Generalized weakness, recent UTI and fall at home, CHF exacerbation, skin ulcerations       Physical Therapy Adult Consult       Evaluate and treat as clinically indicated.    Reason:  Generalized weakness, recent UTI and fall at home, CHF exacerbation, skin ulcerations       WOUND CARE REFERRAL       Your provider has referred you to: [place]: [facility] [phone]  [email or web address]    Reason for referral: Wound care      1. [facility] Wound Consult appointment is related to what kind of wound: chronic skin wound    2. Location of wound: Buttocks and left calf, left foot, left heel, right heel    3. Reason for referral: Assess and treat as indicated    4. Desired treatment if any: Per [facility] nurse     Please be aware that coverage of these services is subject to the terms and limitations of your health insurance plan.  Call member services at your health plan with any benefit or coverage questions.      Please bring the following with you to your appointment:    (1) Any X-Rays, CTs or MRIs which have been performed.  Contact the facility where they were done to arrange for  prior to your scheduled appointment.    (2) List of current medications   (3) This referral request   (4) Any documents/labs given to you for this referral             Your next 10 appointments already scheduled     Oct 24, 2018  8:45 AM CDT   New Visit with Conor Quach MD   [facility]    [address] [facility] [address]    [phone]              Follow-Up Appointment Instructions     Future Labs/Procedures    Follow Up and recommended labs and tests     Comments:    Follow up with [facility] physician.  The following labs/tests are recommended: BMP in one week.  Follow up with urology within 2 weeks to discuss timing of removing the Rico catheter.  Follow up with wound care specialist as scheduled      Follow-Up Appointment Instructions     Follow Up and recommended labs and tests       Follow up with [facility] physician.  The following labs/tests are recommended: BMP in one week.  Follow up with urology within 2 weeks to discuss timing of removing the Rico catheter.  Follow up with wound care specialist as scheduled             Statement of Approval     Ordered          10/16/18 1412  I have reviewed and agree with all the recommendations and orders detailed in this document.  EFFECTIVE NOW     Approved and electronically signed by:  Wendy Martinez MD

## 2018-10-14 NOTE — ED NOTES
ED Nursing criteria listed below was addressed during verbal handoff:     Abnormal vitals: Yes  Abnormal results: Yes  Med Reconciliation completed: Yes  Meds given in ED: Yes  Any Overdue Meds: No  Core Measures: No  Isolation: No  Special needs: Yes  Skin assessment: Yes    Observation Patient  Education provided: Yes:

## 2018-10-14 NOTE — ED NOTES
Spoke with Milli at the [facility] and advised her that this pt is being admitted under observation, also came with 7-8 open areas on her buttock and had over 800 mL of urine in her bladder.

## 2018-10-14 NOTE — PROGRESS NOTES
S-(situation): Patient registered to Observation. Patient arrived to room 265 via cart from Emergency Dept.    B-(background): CHF    A-(assessment): Pt.presented with many open wounds on her left lateral,posterior and anterior leg area.Note wound measurements as ordered.The 3 open areas on anterior left leg measure 1/4 cm diam.Left outer lateral leg open wound is 1/2cm.2 open areas left posterior leg below his left knee 1 measures 3cm length and 1 cm width.2nd one in that area is approx 1/2 cm diam.Open areas to buttock region starting in the right buttock the one that is most distal is 2cm length by 3cm width the one in the coccyx is 3cm length by 1.5cm width.Now left buttock the area starting most distal are 2 open ones 1/2 cm diam with the one most inner to buttock crease to measure 3 cm length by 1cm width.Open areas which we measured to left buttock area most proximal  measuring 3 cm by 1cm the one most distal left buttock measures 1 cm.Both of pts.posterior heels have non-blanchable areas with suspected deep tissue injuries.Left heel measures 1.5 cm and skin is peeling on heel and pts.left lower leg area.The slightly bluish non-blanchable area to right posterior heel measures 1cm.She has scabbed area below left nostril and bruises to left upper arm area.Her right foot is slightly cooler than left foot and 3t pitting edema.Right foot 4t pitting edema and warm.Able to palpate dorsal pedal pulses bilaterally.Barrier cream applied to buttock area.    R-(recommendations): Orders and observation goals reviewed with pt.    Nursing Observation criteria listed below was met:    Skin issues/needs documented:Yes  Isolation needs addressed, if appropriate: NA  Fall Prevention: Education given and documented: Yes  Education Assessment documented:Yes  Education Documented (Pre-existing chronic infection such as, MRSA/VRE need education on admission): No  OBS video/handout Reviewed & DocumentedYes  Medication Reconciliation  Complete: Yes  New medication patient education completed and documented (Possible Side Effects of Common Medications handout): No  Home medications if not able to send immediately home with family stored here: NA  Reminder note placed in discharge instructions: NA  Patient has discharge needs (If yes, please explain): No

## 2018-10-14 NOTE — IP AVS SNAPSHOT
Kina Sears #[id number] (CSN:[id number])  (89 year old F)  (Adm: 10/14/18)     [id number]               [address] MEDICAL SURGICAL: [phone]            Patient Demographics     Patient Name Sex          Age SSN Address Phone    Kina Sears Female 1923 (89 year old) [id number] [facility]  [address] [place] [address] [phone] (Home)      Emergency Contact(s)     Name Relation Home Work Mobile    Javier Sears Son [phone]    Gonzalo Dorado Son [phone]      Admission Information     Attending Provider Admitting Provider Admission Type Admission Date/Time    Ronald Nazario MD Katter, James T, MD Emergency 10/14/18  0826    Discharge Date Hospital Service Auth/Cert Status Service Area     Hospitalist [facility] SERVICES    Unit Room/Bed Admission Status       [facility] MEDICAL SURGICAL  Admission (Confirmed)       Admission     Complaint    CHF (congestive heart failure) (H), Systolic heart failure (H)      Hospital Account     Name Acct ID Class Status Primary Coverage    Kina Sears [id number] Observation Open MEDICARE - MEDICARE RR FOR HB SUPPLEMENT            Guarantor Account (for Hospital Account #[id number])     Name Relation to Pt Service Area Active? Acct Type    Kina Sears  FCS Yes Personal/Family    Address Phone          [facility]  [address] [place] [address] [phone](H)              Coverage Information (for Hospital Account #[id number])     1. MEDICARE/MEDICARE RR FOR HB SUPPLEMENT     F/O Payor/Plan Precert #    MEDICARE/MEDICARE RR FOR HB SUPPLEMENT     Subscriber Subscriber #    Kina Sears [id number]    Address Phone    [address]  [place] [address] [phone]          2. HEALTHPARTNERS/HEALTH[employer]ERS FREEDOM PLAN     F/O Payor/Plan Precert #    HE[employer]NERS/HEALTH[employer]NERS FREEDOM PLAN     Subscriber Subscriber #     "Kina Sears [id number]    Address Phone    [address]  [place] [address] [phone]                                                      INTERAGENCY TRANSFER FORM - PHYSICIAN ORDERS   10/14/2018                       [address] MEDICAL SURGICAL: [phone]            Attending Provider: Ronald Nazario MD     Allergies:  Sulfa Drugs    Infection:  None   Service:  HOSPITALIST    Ht:  1.499 m (4' 11\")   Wt:  56.9 kg (125 lb 7.1 oz)   Admission Wt:  66.7 kg (147 lb)    BMI:  25.34 kg/m 2   BSA:  1.54 m 2            ED Clinical Impression     Diagnosis Description Comment Added By Time Added    Acute congestive heart failure, unspecified heart failure type (H) [I50.9] Acute congestive heart failure, unspecified heart failure type (H) [I50.9]  Kourtney Cano MD 10/14/2018 12:13 PM      Hospital Problems as of 10/16/2018              Priority Class Noted POA    Hyperlipidemia LDL goal <100 Medium  10/31/2010 Yes    Hypertension goal BP (blood pressure) < 140/90 Medium  12/17/2010 Yes    * (Principal)Acute on chronic systolic congestive heart failure (H) Medium  10/14/2018 Yes    Decubitus ulcer of coccygeal region Medium  10/14/2018 Yes    Ulcer of left lower extremity, limited to breakdown of skin (H) Medium  10/14/2018 Yes    Ulcer of heel, left, with unspecified severity (H) Medium  10/14/2018 Yes    Systolic heart failure (H) Medium  10/14/2018 Yes    DNR no code (do not resuscitate) Medium  10/14/2018 Yes    Personal history of urinary tract infection - pseudomonas Medium  10/14/2018 Yes    Urine retention Medium  10/14/2018 Yes    Hx of acute renal failure Medium  10/14/2018 Yes    History of atrial fibrillation Medium  10/14/2018 Yes    History of gout Medium  10/14/2018 Yes      Non-Hospital Problems as of 10/16/2018              Priority Class Noted    OSTEOARTHRITIS Medium  4/8/2005    Spastic hemiplegia (H) Medium  5/26/2005    Abnormal blood chemistry Medium  3/30/2006 "    Acute myocardial infarction (H) Medium  7/17/2006    Arteritis (H) Medium  7/18/2006    [facility] Medium  8/22/2006    DERMATITIS Medium  3/30/2007    Right knee DJD Medium  4/5/2010    Shoulder arthritis Medium  4/5/2010    Health Care Home Low  12/13/2011    Advanced directives, Existing AD rec 7/18/2012 notorized Low  7/18/2012    GI bleed Medium  3/20/2013    Anemia Medium  3/20/2013    Atrophy of vulva Medium  4/15/2014    Falls frequently Medium  12/9/2014    Pneumonia of right upper lobe due to infectious organism (H) Medium  2/11/2016    BPPV (benign paroxysmal positional vertigo) Medium  2/26/2016    Acute on chronic combined systolic and diastolic CHF (congestive heart failure) (H) Medium  1/27/2017      Code Status History     Date Active Date Inactive Code Status Order ID Comments User Context    2/11/2016 11:51 AM 2/16/2016  5:27 PM DNR/DNI [id number]  Jing Hassan APRN CNP Inpatient    12/9/2014  8:48 AM 12/10/2014  6:35 PM DNR/DNI [id number]  Jorge Sepulveda PA-C Inpatient    12/9/2014  1:51 AM 12/9/2014  8:48 AM DNR [id number]  Santosh Dawn DO Inpatient    7/18/2012  3:03 PM 12/9/2014  1:51 AM DNR [id number] See Health care directive dated and notarized 7/18/2012 Sirisha Christensen NP Outpatient      Current Code Status     Date Active Code Status Order ID Comments User Context       10/14/2018  2:24 PM DNR/DNI [id number]  Whitney Argueta, CNP Inpatient       Questions for Current Code Status     Question Answer Comment    Code status determined by: Discussion with patient/legal decision maker       Summary of Visit     Reason for your hospital stay       Shortness of breath with increased fluid found on your lungs and legs - you have responded well to the extra Lasix given during this hospital stay and will be going home with a slight increase in your lasix.  Also, you were struggling with pain and this has improved with the addition to Ultram to your  Tylenol.  You do have several wound ulcers and have seen a general surgeon during this stay and will continue to get wound care the the TCU as you return.                Medication Review      START taking        Dose / Directions Comments    traMADol 50 MG tablet   Commonly known as:  ULTRAM        Dose:  25 mg   Take 0.5 tablets (25 mg) by mouth every 6 hours as needed for moderate pain   Quantity:  12 tablet   Refills:  0          CONTINUE these medications which may have CHANGED, or have new prescriptions. If we are uncertain of the size of tablets/capsules you have at home, strength may be listed as something that might have changed.        Dose / Directions Comments    acetaminophen 325 MG tablet   Commonly known as:  TYLENOL   This may have changed:    - medication strength  - how much to take  - when to take this  - reasons to take this        Dose:  650 mg   Take 2 tablets (650 mg) by mouth every 4 hours as needed for mild pain   Quantity:  100 tablet   Refills:  0        furosemide 20 MG tablet   Commonly known as:  LASIX   This may have changed:  additional instructions   Used for:  Acute congestive heart failure, unspecified heart failure type (H)        Dose:  20 mg   Take 1 tablet (20 mg) by mouth daily At 0800 and 1/2 tablet (10 mg) by mouth by mouth at 1400   Quantity:  30 tablet   Refills:  0          CONTINUE these medications which have NOT CHANGED        Dose / Directions Comments    Calcium Carb-Cholecalciferol 500-200 MG-UNIT Tabs        Dose:  1 tablet   Take 1 tablet by mouth daily   Refills:  0        ferrous sulfate 325 (65 Fe) MG tablet   Commonly known as:  IRON        Dose:  325 mg   Take 325 mg by mouth daily (with breakfast)   Refills:  0        fluticasone 50 MCG/ACT spray   Commonly known as:  FLONASE   Used for:  Sinus congestion        Dose:  2 spray   Spray 2 sprays into both nostrils daily As needed for nasal congestion.   Quantity:  16 g   Refills:  3        lisinopril 20 MG  tablet   Commonly known as:  PRINIVIL/ZESTRIL   Used for:  Essential hypertension with goal blood pressure less than 140/90        TAKE ONE TABLET BY MOUTH EVERY DAY   Quantity:  90 tablet   Refills:  0        metoprolol succinate 25 MG 24 hr tablet   Commonly known as:  TOPROL-XL   Used for:  Acute on chronic combined systolic and diastolic congestive heart failure (H)        TAKE ONE TABLET BY MOUTH EVERY DAY   Quantity:  90 tablet   Refills:  1        nitroGLYcerin 0.4 MG sublingual tablet   Commonly known as:  NITROSTAT   Used for:  H/O angina pectoris        Dose:  0.4 mg   Place 1 tablet (0.4 mg) under the tongue See Admin Instructions EVERY 5 MIN AS NEEDED, UP TO 3 PER EPISODE   Quantity:  90 tablet   Refills:  0        omeprazole 20 MG CR capsule   Commonly known as:  priLOSEC   Used for:  Gastroesophageal reflux disease, esophagitis presence not specified        Dose:  20 mg   Take 1 capsule (20 mg) by mouth daily   Quantity:  90 capsule   Refills:  1        order for DME   Used for:  Falls frequently, Primary osteoarthritis of right knee, Spastic hemiplegia (H)        Equipment being ordered: 4 wheeled walker   Quantity:  1 Device   Refills:  0          STOP taking     nystatin [id number] UNIT/GM Powd   Commonly known as:  MYCOSTATIN                   After Care     Activity - Up with nursing assistance           Advance Diet as Tolerated       Follow this diet upon discharge: Regular       Daily weights       Call Provider for weight gain of more than 2 pounds per day or 5 pounds per week.       Rico catheter       To straight gravity drainage. Change catheter every 2 weeks and PRN for leaking or decreased uring output with signs of bladder distention. DO NOT change catheter without a specific MD order IF diagnosis of benign prostatic hypertrophy (BPH), neurogenic bladder, or other urological conditions       General info for SNF       Length of Stay Estimate: Short Term Care: Estimated # of Days  <30  Condition at Discharge: Improving  Level of care:skilled   Rehabilitation Potential: Fair  Admission H&P remains valid and up-to-date: Yes  Recent Chemotherapy: N/A  Use Nursing Home Standing Orders: Yes       Intake and output       Every shift       Mantoux instructions       Give two-step Mantoux (PPD) Per Facility Policy Yes       Wound care       Site:   Left leg and foot ulceration, bilateral buttock ulcerations   Instructions:  Apply Mepilex to all wounds.  Change every 3 days and prn before then if Mepilex disturbed.             Referrals     Occupational Therapy Adult Consult       Evaluate and treat as clinically indicated.    Reason:  Generalized weakness, recent UTI and fall at home, CHF exacerbation, skin ulcerations       Physical Therapy Adult Consult       Evaluate and treat as clinically indicated.    Reason:  Generalized weakness, recent UTI and fall at home, CHF exacerbation, skin ulcerations       WOUND CARE REFERRAL       Your provider has referred you to: Tia: [facility] [phone]  [email or web address]    Reason for referral: Wound care      1. [facility] Wound Consult appointment is related to what kind of wound: chronic skin wound    2. Location of wound: Buttocks and left calf, left foot, left heel, right heel    3. Reason for referral: Assess and treat as indicated    4. Desired treatment if any: Per [facility] nurse     Please be aware that coverage of these services is subject to the terms and limitations of your health insurance plan.  Call member services at your health plan with any benefit or coverage questions.      Please bring the following with you to your appointment:    (1) Any X-Rays, CTs or MRIs which have been performed.  Contact the facility where they were done to arrange for  prior to your scheduled appointment.    (2) List of current medications   (3) This referral request   (4) Any documents/labs  "given to you for this referral             Follow-Up Appointment Instructions     Follow Up and recommended labs and tests       Follow up with halfway physician.  The following labs/tests are recommended: BMP in one week.  Follow up with urology within 2 weeks to discuss timing of removing the Rico catheter.  Follow up with wound care specialist as scheduled             Your next 10 appointments already scheduled     Oct 24, 2018  8:45 AM CDT   New Visit with Conor Quach MD   [facility] ([facility])    [address] [employer] [address]   [phone]              Statement of Approval     Ordered          10/16/18 1412  I have reviewed and agree with all the recommendations and orders detailed in this document.  EFFECTIVE NOW     Approved and electronically signed by:  Wendy Martinez MD                                                 INTERAGENCY TRANSFER FORM - NURSING   10/14/2018                       [address] MEDICAL SURGICAL: [phone]            Attending Provider: Ronald Nazario MD     Allergies:  Sulfa Drugs    Infection:  None   Service:  HOSPITALIST    Ht:  1.499 m (4' 11\")   Wt:  56.9 kg (125 lb 7.1 oz)   Admission Wt:  66.7 kg (147 lb)    BMI:  25.34 kg/m 2   BSA:  1.54 m 2            Advance Directives        Scanned docmt in ACP Activity?           Yes, scanned ACP docmt        Immunizations     Name Date      Influenza (High Dose) 3 valent vaccine 09/01/16     Influenza (IIV3) PF 11/29/11     Influenza (IIV3) PF 10/28/05     Influenza (IIV3) PF 10/30/04     Influenza (IIV3) PF 10/14/03     Influenza (IIV3) PF 10/15/01     TD (ADULT, 7+) 09/26/07       ASSESSMENT     Discharge Profile Flowsheet     EXPECTED DISCHARGE     FINAL RESOURCES      Expected Discharge Date  10/15/18 10/15/18 1022   Resources List  Skilled Nursing Facility 10/15/18 1022    DISCHARGE NEEDS ASSESSMENT     Skilled Nursing Facility  [facility] [phone], Fax: [phone] 10/15/18 1022    Concerns To Be Addressed  no discharge needs identified 03/21/13 1208   [facility] Number  [id number] 03/10/16 1341    Concerns Comments  Eager to DC home, home health visit due in am.  03/21/13 1208   SKIN      Patient/family verbalizes understanding of discharge plan recommendations?  Yes 10/15/18 1022   Inspection of bony prominences  Full 10/16/18 1410    Equipment Currently Used at Home  walker, rolling (four wheeled walker) 10/15/18 1448   Inspection under devices  Full 10/16/18 1410    Transportation Available  van, wheelchair accessible 10/15/18 1448   Skin WDL  ex 10/16/18 1410    # of Referrals Placed by [facility]  Internal Clinic Care Coordination;Post Acute Facilities;Transportation 03/10/16 1341   Skin Color/Characteristics  pale;redness blanchable;redness nonblanchable 10/16/18 1410    Does Patient Need a Referral for Clinic CC  Yes 03/10/16 1341   Skin Temperature  warm 10/16/18 1410    Primary Care Clinic Name  [facility] 10/15/18 1022   Skin Moisture  flaky;dry 10/16/18 1410    Primary Care MD Name  Melisa Btucher 02/12/16 1126   Skin Elasticity  slow return to original state 10/16/18 0836    Equipment Used at Home  bath bench;grab bar;lifeline/medical alert;walker, rolling 02/14/16 1051   Skin Integrity  abrasion(s);pressure ulcer(s);scab(s);wound(s) 10/16/18 1410    GASTROINTESTINAL (ADULT,PEDIATRIC,OB)     Additional Documentation  Pressure Ulcer (LDA) 10/14/18 1645    GI WDL  WDL 10/16/18 1410   SAFETY      Last Bowel Movement  10/15/18 10/15/18 1502   Safety WDL  WDL 10/16/18 1410    COMMUNICATION ASSESSMENT     All Alarms  alarm(s) activated and audible 10/16/18 0836    Patient's communication style  spoken language (English or Bilingual) 10/14/18 0830                      Assessment WDL (Within Defined Limits) Definitions           Safety WDL     Effective: 09/28/15    Row Information: <b>WDL Definition:</b> Bed in low position, wheels locked; call  "light in reach; upper side rails up x 2; ID band on<br> <font color=\"gray\"><i>Item=AS safety wdl>>List=AS safety wdl>>Version=F14</i></font>      Skin WDL     Effective: 09/28/15    Row Information: <b>WDL Definition:</b> Warm; dry; intact; elastic; without discoloration; pressure points without redness<br> <font color=\"gray\"><i>Item=AS skin wdl>>List=AS skin wdl>>Version=F14</i></font>      Vitals     Vital Signs Flowsheet     VITAL SIGNS     Sleep  normal sleep 10/15/18 1458    Temp  98.9  F (37.2  C) 10/16/18 1223   ANALGESIA SIDE EFFECTS MONITORING      Temp src  Oral 10/16/18 1223   Side Effects Monitoring: Respiratory Quality  R 10/16/18 1337    Resp  23 10/16/18 1223   Side Effects Monitoring: Respiratory Depth  N 10/16/18 1337    Pulse  87 10/16/18 1223   Side Effects Monitoring: Sedation Level  1 10/16/18 1337    Heart Rate  87 10/16/18 1223   BOBO COMA SCALE      Pulse/Heart Rate Source  Monitor 10/16/18 1223   Best Eye Response  4-->(E4) spontaneous 10/16/18 0836    BP  136/44 10/16/18 1223   Best Motor Response  6-->(M6) obeys commands 10/16/18 0836    BP Location  Left arm 10/16/18 1223   Best Verbal Response  5-->(V5) oriented 10/16/18 0836    OXYGEN THERAPY     Bobo Coma Scale Score  15 10/16/18 0836    SpO2  90 % 10/16/18 1223   HEIGHT AND WEIGHT      O2 Device  None (Room air) 10/16/18 1223   Height  1.499 m (4' 11\") 10/14/18 1308    Oxygen Delivery  1 LPM 10/15/18 0701   Height Method  Stated 10/14/18 1308    PAIN/COMFORT     Height Method  Actual 10/14/18 0836    Patient Currently in Pain  yes 10/15/18 2042   Weight  56.9 kg (125 lb 7.1 oz) 10/16/18 0218    Preferred Pain Scale  CAPA (Clinically Aligned Pain Assessment) ([facility], [facility] and [facility] Adults Only) 10/15/18 1458   Weight Method  Bed scale 10/16/18 0218    0-10 Pain Scale  8 10/16/18 0218   BSA (Calculated - sq m)  1.57 10/14/18 1308    Word Pain Scale  2 10/14/18 1712   BMI (Calculated)  26.28 10/14/18 1308    Pain Location  " Buttocks 10/15/18 2042   EKG MONITORING      Pain Orientation  Right;Left 10/15/18 1637   Cardiac Rhythm  NSR 10/15/18 1241    Pain Descriptors  Sore 10/15/18 2042   Cardiac Regularity  Regular 10/15/18 1241    Pain Management Interventions  analgesia administered 10/15/18 1637   POSITIONING      Pain Intervention(s)  Medication (See eMAR) 10/16/18 1337   Body Position  side-lying, left 10/16/18 1324    Response to Interventions  Decrease in pain 10/16/18 1337   Head of Bed (HOB)  HOB at 15 degrees 10/16/18 1324    CLINICALLY ALIGNED PAIN ASSESSMENT (CAPA) ([facility], [facility] ADULTS ONLY)     Positioning/Transfer Devices  pillows;in use 10/15/18 0053    Comfort  negligible pain 10/15/18 1458   DAILY CARE      Change in Pain  about the same 10/15/18 2236   Activity Management  activity adjusted per tolerance 10/16/18 1416    Pain Control  fully effective 10/15/18 1458   Activity Assistance Provided  assistance, 2 people 10/16/18 0836    Functioning  can do everything I need to 10/15/18 1458                 Patient Lines/Drains/Airways Status    Active LINES/DRAINS/AIRWAYS     Name: Placement date: Placement time: Site: Days: Last dressing change:    Urethral Catheter Latex 16 fr 10/15/18   2300   Latex   less than 1     Pressure Injury 10/14/18 Buttocks multiple dime to quarter sized abrasions/ulcers 10/14/18   1300    2     Wound 10/14/18 Left;Lower Leg Abrasion(s) 10/14/18   1300   Leg   2             Patient Lines/Drains/Airways Status    Active PICC/CVC     None            Intake/Output Detail Report     Date Intake   Output Net    Shift P.O. IV Piggyback Total Urine Total       Day 10/15/18 0700 - 10/15/18 1459 600 -- 600 200 200 400    Yudi 10/15/18 1500 - 10/15/18 2259 900 -- 900 155 155 745    Noc 10/15/18 2300 - 10/16/18 0659 300 -- 300 825 825 -525    Day 10/16/18 0700 - 10/16/18 1459 100 -- 100 925 925 -825    Yudi 10/16/18 1500 - 10/16/18 2259 -- -- -- -- -- 0      Last Void/BM       Most Recent  Value    Urine Occurrence 1 at 10/14/2018 1803    Stool Occurrence 1 at 10/14/2018 2153      Case Management/Discharge Planning     Case Management/Discharge Planning Flowsheet     REFERRAL INFORMATION     Concerns To Be Addressed  no discharge needs identified 03/21/13 1208    Did the Initial Social Work Assessment result in a Social Work Case?  No 10/15/18 1019   Concerns Comments  Eager to DC home, home health visit due in am.  03/21/13 1208    Arrived From  home 02/12/16 1123   DISCHARGE PLANNING      # of Referrals Placed by [facility]  Internal Clinic Care Coordination;Post Acute Facilities;Transportation 03/10/16 1341   Patient/family verbalizes understanding of discharge plan recommendations?  Yes 10/15/18 1022    Reason For Consult  discharge planning 10/15/18 1019   Transportation Available  van, wheelchair accessible 10/15/18 1448    Primary Care Clinic Name  [facility] 10/15/18 1022   Does Patient Need a Referral for Clinic CC  Yes 03/10/16 1341    Primary Care MD Name  Melisa Butcher 02/12/16 1126   Equipment Used at Home  bath bench;grab bar;lifeline/medical alert;walker, rolling 02/14/16 1051    LIVING ENVIRONMENT     FINAL RESOURCES      Lives With  facility resident 10/15/18 1448   Equipment Currently Used at Home  walker, rolling (four wheeled walker) 10/15/18 1448    Living Arrangements  extended care facility 10/15/18 1448   Resources List  Skilled Nursing Facility 10/15/18 1022    Able to Return to Prior Living Arrangements  yes 10/15/18 1022   Skilled Nursing Facility  [facility] [phone], Fax: [phone] 10/15/18 1022    ASSESSMENT OF FAMILY/SOCIAL SUPPORT     PAS Number  [id number] 03/10/16 1341    Who is your support system?  Facility resident(s)/Staff 10/15/18 1022   ABUSE RISK SCREEN      Description of Support System  Supportive;Involved 10/15/18 1022   QUESTION TO PATIENT:  Has a member of your family or a partner(now or in the past) intimidated, hurt, manipulated, or controlled  you in any way?  no 10/14/18 1430    Support Assessment  Adequate family and caregiver support 10/15/18 1022   QUESTION TO PATIENT: Do you feel safe going back to the place where you are living?  yes 10/14/18 1430    COPING/STRESS     OBSERVATION: Is there reason to believe there has been maltreatment of a vulnerable adult (ie. Physical/Sexual/Emotional abuse, self neglect, lack of adequate food, shelter, medical care, or financial exploitation)?  no 10/14/18 0838    Major Change/Loss/Stressor  none 02/11/16 1007   OTHER      Patient Personal Strengths  motivated;positive attitude 02/12/16 1126   Are you depressed or being treated for depression?  No 10/14/18 1430    EXPECTED DISCHARGE     HOMICIDE RISK      Expected Discharge Date  10/15/18 10/15/18 1022   Feels Like Hurting Others  no 10/14/18 0838    ASSESSMENT/CONCERNS TO BE ADDRESSED                         [address] SURGICAL: [phone]            Medication Administration Report for Kina Sears as of 10/16/18 1523   Legend:    Given Hold Not Given Due Canceled Entry Other Actions    Time Time (Time) Time  Time-Action       Inactive    Active    Linked        Medications 10/10/18 10/11/18 10/12/18 10/13/18 10/14/18 10/15/18 10/16/18    acetaminophen (TYLENOL) tablet 650 mg  Dose: 650 mg  Freq: EVERY 4 HOURS PRN Route: PO  PRN Reason: mild pain  Start: 10/14/18 1313   Admin Instructions: Alternate ibuprofen (if ordered) with acetaminophen.  Maximum acetaminophen dose from all sources = 75 mg/kg/day not to exceed 4 grams/day.    Admin. Amount: 2 tablet (2 × 325 mg tablet)  Last Admin: 10/16/18 0804  Dispense Loc: [facility] ADS Med Surg         1710 (650 mg)-Given       2251 (650 mg)-Given        1144 (650 mg)-Given       1636 (650 mg)-Given       2342 (650 mg)-Given        0804 (650 mg)-Given           furosemide (LASIX) tablet 20 mg  Dose: 20 mg  Freq: DAILY Route: PO  Start: 10/15/18 1615   Admin. Amount: 1 tablet (1 × 20 mg tablet)  Last  "Admin: 10/16/18 0836  Dispense Loc: [facility] ADS Med Surg          1636 (20 mg)-Given        0836 (20 mg)-Given           lidocaine (LMX4) kit  Freq: EVERY 1 HOUR PRN Route: Top  PRN Reason: pain  PRN Comment: with VAD insertion or accessing implanted port.  Start: 10/14/18 1421   Admin Instructions: Do NOT give if patient has a history of allergy to any local anesthetic or any \"patricia\" product.  Apply 30 minutes prior to VAD insertion or port access. MAX Dose: 2.5 g (  of 5 g tube).    Dispense Loc: [facility] ADS Med Surg               lidocaine 1 % 1 mL  Dose: 1 mL  Freq: EVERY 1 HOUR PRN Route: OTHER  PRN Comment: mild pain with VAD insertion or accessing implanted port  Start: 10/14/18 1421   Admin Instructions: Do NOT give if patient has a history of allergy to any local anesthetic or any \"patricia\" product. MAX dose 1 mL subcutaneous OR intradermal in divided doses.    Admin. Amount: 1 mL  Dispense Loc: [facility] Med Surg  Volume: 2 mL               lisinopril (PRINIVIL/ZESTRIL) tablet 20 mg  Dose: 20 mg  Freq: DAILY Route: PO  Start: 10/15/18 0900   Admin. Amount: 2 tablet (2 × 10 mg tablet)  Last Admin: 10/16/18 0837  Dispense Loc: [facility] Med Surg          0851 (20 mg)-Given        0837 (20 mg)-Given           metoprolol succinate (TOPROL-XL) 24 hr tablet 25 mg  Dose: 25 mg  Freq: DAILY Route: PO  Start: 10/15/18 0900   Admin Instructions: DO NOT CRUSH. Tablet may be split in half along score line.    Admin. Amount: 1 tablet (1 × 25 mg tablet)  Last Admin: 10/16/18 0838  Dispense Loc: [facility] ADS Med Surg          0851 (25 mg)-Given        0838 (25 mg)-Given           miconazole (MICATIN; MICRO GUARD) 2 % powder  Freq: DAILY PRN Route: Top  PRN Reason: other  PRN Comment: red/areas in skin folds  Start: 10/14/18 1419   Admin Instructions: Apply to skin folds<br><br>Miconazole powder has been automatically substituted for Nystatin powder per [place] [employer]<br>    Dispense Loc: [facility] Main Pharmacy               naloxone " (NARCAN) injection 0.1-0.4 mg  Dose: 0.1-0.4 mg  Freq: EVERY 2 MIN PRN Route: IV  PRN Reason: opioid reversal  Start: 10/14/18 1313   Admin Instructions: For respiratory rate LESS than or EQUAL to 8.  Partial reversal dose:  0.1 mg titrated q 2 minutes for Analgesia Side Effects Monitoring Sedation Level of 3 (frequently drowsy, arousable, drifts to sleep during conversation).Full reversal dose:  0.4 mg bolus for Analgesia Side Effects Monitoring Sedation Level of 4 (somnolent, minimal or no response to stimulation).  For ordered IV doses 0.1-2mg give IVP. Give each 0.4mg over 15 seconds in emergency situations. For non-emergent situations further dilute in 9mL of NS to facilitate titration of response.    Admin. Amount: 0.1-0.4 mg = 0.25-1 mL Conc: 0.4 mg/mL  Dispense Loc: [facility] ADS Med Surg  Volume: 1 mL               nitroGLYcerin (NITROSTAT) sublingual tablet 0.4 mg  Dose: 0.4 mg  Freq: EVERY 5 MIN PRN Route: SL  PRN Reason: chest pain  Start: 10/14/18 1426   Admin. Amount: 1 tablet (1 × 0.4 mg tablet)  Dispense Loc: [facility] ADS Med Surg               omeprazole (priLOSEC) CR capsule 20 mg  Dose: 20 mg  Freq: DAILY Route: PO  Start: 10/15/18 0900   Admin. Amount: 1 capsule (1 × 20 mg capsule)  Last Admin: 10/16/18 0838  Dispense Loc: [facility] ADS Med Surg          0851 (20 mg)-Given        0838 (20 mg)-Given           potassium chloride (KLOR-CON) Packet 20-40 mEq  Dose: 20-40 mEq  Freq: EVERY 2 HOURS PRN Route: ORAL OR FEED  PRN Reason: potassium supplementation  Start: 10/15/18 0921   Admin Instructions: Use if unable to tolerate tablets.  If Serum K+ 3.0-3.3, dose = 60 mEq po total dose (40 mEq x1 followed in 2 hours by 20 mEq x1). Recheck K+ level 4 hours after dose and the next AM.  If Serum K+ 2.5-2.9, dose = 80 mEq po total dose (40 mEq Q2H x2). Recheck K+ level 4 hours after dose and the next AM.  If Serum K+ less than 2.5, See IV order.  Dissolve packet contents in 4-8 ounces of cold water or juice.    Admin.  Amount: 20-40 mEq  Dispense Loc: [facility] ADS Med Surg               potassium chloride 10 mEq in 100 mL intermittent infusion with 10 mg lidocaine  Dose: 10 mEq  Freq: EVERY 1 HOUR PRN Route: IV  PRN Reason: potassium supplementation  Start: 10/15/18 0921   Admin Instructions: Infuse via PERIPHERAL LINE. Use potassium with lidocaine for pain with peripheral administration.  If Serum K+ 3.0-3.3, dose = 10 mEq/hr x4 doses (40 mEq IV total dose). Recheck K+ level 2 hours after dose and the next AM.  If Serum K+ less than 3.0, dose = 10 mEq/hr x6 doses (60 mEq IV total dose). Recheck K+ level 2 hours after dose and the next AM.    Admin. Amount: 10 mEq = 100 mL Conc: 10 mEq/100 mL  Dispense Loc: [facility] Main Pharmacy  Infused Over: 1 Hours  Volume: 100 mL               potassium chloride 10 mEq in 100 mL sterile water intermittent infusion (premix)  Dose: 10 mEq  Freq: EVERY 1 HOUR PRN Route: IV  PRN Reason: potassium supplementation  Start: 10/15/18 0921   Admin Instructions: Infuse via PERIPHERAL LINE or CENTRAL LINE. Use for central line replacement if patient weight less than 65 kg, if patient is on TPN with high potassium content or if unit does not stock 20 mEq bags.   If Serum K+ 3.0-3.3, dose = 10 mEq/hr x4 doses (40 mEq IV total dose). Recheck K+ level 2 hours after dose and the next AM.   If Serum K+ less than 3.0, dose = 10 mEq/hr x6 doses (60 mEq IV total dose). Recheck K+ level 2 hours after dose and the next AM.    Admin. Amount: 10 mEq = 100 mL Conc: 10 mEq/100 mL  Dispense Loc: [facility] Main Pharmacy  Infused Over: 60 Minutes  Volume: 100 mL               potassium chloride 20 mEq in 50 mL intermittent infusion  Dose: 20 mEq  Freq: EVERY 1 HOUR PRN Route: IV  PRN Reason: potassium supplementation  Start: 10/15/18 0921   Admin Instructions: Infuse via CENTRAL LINE Only. May need EKG if less than 65 kg or on TPN - Max rate is 0.3 mEq/kg/hr for patients not on EKG monitoring.   If Serum K+ 3.0-3.3, dose = 20 mEq/hr x2  doses (40 mEq IV total dose). Recheck K+ level 2 hours after dose and the next AM.  If Serum K+ less than 3.0, dose = 20 mEq/hr x3 doses (60 mEq IV total dose). Recheck K+ level 2 hours after dose and the next AM.    Admin. Amount: 20 mEq = 50 mL Conc: 20 mEq/50 mL  Dispense Loc: [facility] Main Pharmacy  Volume: 50 mL               potassium chloride SA (K-DUR/KLOR-CON M) CR tablet 20-40 mEq  Dose: 20-40 mEq  Freq: EVERY 2 HOURS PRN Route: PO  PRN Reason: potassium supplementation  Start: 10/15/18 0921   Admin Instructions: Use if able to take PO.   If Serum K+ 3.0-3.3, dose = 60 mEq po total dose (40 mEq x1 followed in 2 hours by 20 mEq x1). Recheck K+ level 4 hours after dose and the next AM.  If Serum K+ 2.5-2.9, dose = 80 mEq po total dose (40 mEq Q2H x2). Recheck K+ level 4 hours after dose and the next AM.  If Serum K+ less than 2.5, See IV order.  DO NOT CRUSH    Admin. Amount: 1-2 tablet (1-2 × 20 mEq tablet)  Last Admin: 10/15/18 1155  Dispense Loc: [facility] ADS Med Surg          0955 (40 mEq)-Given       1155 (20 mEq)-Given            sodium chloride (PF) 0.9% PF flush 3 mL  Dose: 3 mL  Freq: EVERY 8 HOURS Route: IK  Start: 10/14/18 1430   Admin Instructions: And Q1H PRN, to lock peripheral IV dormant line.    Admin. Amount: 3 mL  Last Admin: 10/16/18 0741  Dispense Loc: [facility] Floor Stock  Volume: 3 mL         1617 (3 mL)-Given       2153 (3 mL)-Given        0932 (3 mL)-Given       1635 (3 mL)-Given       2346 (3 mL)-Given        (0736)-Not Given       0741 (3 mL)-Given       [ ] 1430       [ ] 2230           sodium chloride (PF) 0.9% PF flush 3 mL  Dose: 3 mL  Freq: EVERY 1 HOUR PRN Route: IK  PRN Reason: line flush  Start: 10/14/18 1421   Admin Instructions: for peripheral IV flush post IV meds    Admin. Amount: 3 mL  Last Admin: 10/14/18 1920  Dispense Loc: [facility] Floor Stock  Volume: 3 mL         1920 (3 mL)-Given             sodium chloride (PF) 0.9% PF flush 3 mL  Dose: 3 mL  Freq: EVERY 1 HOUR PRN Route: IK  PRN  Reason: line flush  PRN Comment: for peripheral IV flush post IV meds  Start: 10/14/18 1313   Admin. Amount: 3 mL  Dispense Loc: [facility] Floor Stock  Volume: 3 mL               sodium chloride (PF) 0.9% PF flush 3 mL  Dose: 3 mL  Freq: EVERY 1 HOUR PRN Route: IK  PRN Reason: line flush  Start: 10/14/18 0851   Admin Instructions: for peripheral IV flush post IV meds    Admin. Amount: 3 mL  Last Admin: 10/14/18 1023  Dispense Loc: [facility] Floor Stock  Volume: 3 mL         1023 (3 mL)-Given             traMADol (ULTRAM) half-tab 25 mg  Dose: 25 mg  Freq: 2 TIMES DAILY PRN Route: PO  PRN Reason: moderate pain  Start: 10/15/18 2018   Admin. Amount: 1 half-tab (1 × 25 mg half-tab)  Last Admin: 10/16/18 1112  Dispense Loc: [facility] ADS Med Surg          2038 (25 mg)-Given        0218 (25 mg)-Given       111 (25 mg)-Given          Completed Medications  Medications 10/10/18 10/11/18 10/12/18 10/13/18 10/14/18 10/15/18 10/16/18         Dose: 20 mg  Freq: ONCE Route: IV  Start: 10/14/18 184   End: 10/14/18 1921   Admin Instructions: For ordered IV doses 1-40 mg, give IV Push undiluted over 1-2 minutes.    Admin. Amount: 20 mg = 2 mL Conc: 10 mg/mL  Last Admin: 10/14/18 1919  Dispense Loc: [facility] Med Surg  Infused Over: 1-2 Minutes  Administrations Remainin  Volume: 2 mL          (20 mg)-Given               Dose: 20 mg  Freq: ONCE Route: IV  Start: 10/14/18 1030   End: 10/14/18 1137   Admin Instructions: For ordered IV doses 1-40 mg, give IV Push undiluted over 1-2 minutes.    Admin. Amount: 20 mg = 2 mL Conc: 10 mg/mL  Last Admin: 10/14/18 1135  Dispense Loc: [facility] ED  Infused Over: 1-2 Minutes  Administrations Remainin  Volume: 2 mL         113 (20 mg)-Given               Dose: 20 mg  Freq: ONCE Route: IV  Start: 10/14/18 0930   End: 10/14/18 1025   Admin Instructions: For ordered IV doses 1-40 mg, give IV Push undiluted over 1-2 minutes.    Admin. Amount: 20 mg = 2 mL Conc: 10 mg/mL  Last Admin: 10/14/18 1023  Dispense  Loc: [facility] ADS ED  Infused Over: 1-2 Minutes  Administrations Remainin  Volume: 2 mL         1023 (20 mg)-Given            Discontinued Medications  Medications 10/10/18 10/11/18 10/12/18 10/13/18 10/14/18 10/15/18 10/16/18         Dose: 20 mg  Freq: DAILY Route: PO  Start: 10/16/18 0900   End: 10/15/18 1600   Admin. Amount: 1 tablet (1 × 20 mg tablet)  Dispense Loc: [facility] ADS Med Surg          1600-Med Discontinued          Dose: 20 mg  Freq: 2 TIMES DAILY Route: PO  Start: 10/15/18 1600   End: 10/15/18 1034   Admin. Amount: 1 tablet (1 × 20 mg tablet)  Dispense Loc: [facility] ADS Med Surg          1034-Med Discontinued          Dose: 20 mg  Freq: DAILY Route: PO  Start: 10/15/18 0900   End: 10/15/18 1032   Admin. Amount: 1 tablet (1 × 20 mg tablet)  Last Admin: 10/15/18 0852  Dispense Loc: [facility] Med Surg          0852 (20 mg)-Given       1032-Med Discontinued          Dose: 3 mL  Freq: EVERY 8 HOURS Route: IK  Start: 10/14/18 1315   End: 10/14/18 1818   Admin Instructions: And Q1H PRN, to lock peripheral IV dormant line.    Admin. Amount: 3 mL  Dispense Loc: [facility] Floor Stock  Volume: 3 mL         (1506)-Not Given [C]       1818-Med Discontinued                  INTERAGENCY TRANSFER FORM - NOTES (H&P, Discharge Summary, Consults, Procedures, Therapies)   10/14/2018                       [address] MEDICAL SURGICAL: [phone]               History & Physicals      H&P by Whitney Argueta CNP at 10/14/2018  1:00 PM     Author:  Whitney Argueta CNP Service:  Hospitalist Author Type:  Nurse Practitioner    Filed:  10/14/2018  4:20 PM Date of Service:  10/14/2018  1:00 PM Creation Time:  10/14/2018  1:29 PM    Status:  Attested :  Whitney Argueta CNP (Nurse Practitioner)    Cosigner:  Ronlad Nazario MD at 10/14/2018  4:27 PM        Attestation signed by Ronald Nazario MD at 10/14/2018  4:27 PM        Physician Attestation   I, Ronald Nazario, have reviewed and discussed with the advanced  practice provider their history, physical and plan for Kina Sears. I did not participate in a shared visit by interviewing or examining the patient and this should be billed as an advanced practice provider only visit.    Ronald Nazario  Date of Service (when I saw the patient): I did not personally see this patient today.                               [facility]    History and Physical  Hospitalist       Date of Admission:  10/14/2018[CE1.1]    Assessment & Plan[CE1.2]   Kina Sears is a 89 year old female who presents with[CE1.1] acute on chronic decompemnsated systolic heart failure with[CE1.3] shortness of breath and left leg swelling[CE1.4] that started last night and patient states she was unable to sleep. Patient was noted to be retaining urine in the ER and fluid overload was noted on her[CE1.5] chest xray.[CE1.6]       Principal Problem:    Acute on chronic systolic congestive heart failure (H)[CE1.7]    Spoke to patient family, due to her decline and age patient and family are focused on symptom management and quality of life.Pt son, HCA, asking about palliative care and hospice  Patient with a recent complicated hospitalization, discharged from [facility] on 10/9/18    Systolic heart failure, acute on chronic. Patient with echo in 2017, EF 25%.   Due to goals of care will not order an updated Echocardiogram  Patient is DNR  No transfer to ICU or aggressive care  Patient has been on lasix, but with recent hospitalization this was held due to hypotension  BNP elevated this admission  Troponin negative  IV Lasix given in ER with good results  Plan to observe patient on telemetry, monitor oxygen saturations, blood pressure  Will need to adjust diuretics for possible discaharge home[CE1.8]    Active Problems:      Hypertension goal BP (blood pressure) < 140/90[CE1.7]  Chronic, stable. Patient currently normotensive. OP prescriptions Lisinopril and Metoprolol. Will resume  pending clinical course and patient response to IV Lasix.[CE1.8]       Decubitus ulcer of coccygeal region    Ulcer of left lower extremity, limited to breakdown of skin (H)    Ulcer of heel, left, with unspecified severity (H)[CE1.7]  Noted on admission. Patient with recent hospitalization for cellulitis, skin ulcerations noted at that time. Skin breakdown documented. Heel protectors. Ordered [facility] nurse to evaluate and treat. Barrier cream to bottom.[CE1.8]       Personal history of urinary tract infection - pseudomonas[CE1.7]  Patient recently completed a course of two antibiotics.   Will re culture urine to ensure resolution of the infection.[CE1.8]       Urine retention[CE1.7]  Noted on admission and on recent hospitalization. Patient was completed Keflex and Cipro on the 10/12 for an infection.[CE1.8]  Patient was on Oxybutynin for 2 months prior to her presentation to the ER on 9/24 for urine retention.  Unknown dose and unknown plan at this time for continued treatment.[CE1.9] Patient with current ramírez catheter in place. Will need to evaluate the need for this on discharge.[CE1.3]       Hx of acute renal failure[CE1.7]  Recent acute renal injury during last hospitalization. Will need to monitor.[CE1.8]       History of atrial fibrillation[CE1.7]  Patient with a history of A fib.Monitor with telemetry. EKG showed NS with L BBB.[CE1.8]       History of gout[CE1.7]  Patient was on Allopurinol at some point in the recent past, not at this time. Will need to monitor.[CE1.8]       Hyperlipidemia LDL goal <100[CE1.7]  Chronic, no treatment.[CE1.8]       DNR no code (do not resuscitate)[CE1.7]  Will honor.     Diarrhea:   Patient states she has been taking laxatives for constipation this week. Will monitor.[CE1.8]     DVT Prophylaxis:[CE1.1] Obs[CE1.6]  Code Status:[CE1.1] Full Code[CE1.10]  Expected discharge:[CE1.1] Tomorrow[CE1.10], recommended to[CE1.1] prior living arrangement[CE1.10] once[CE1.1] hemodynamically  stable[CE1.10].[CE1.1]    Whitney Argueta, CNP    Primary Care Physician   Physician No Ref-Primary    Chief Complaint[CE1.2]   Shortness of breath and edema    History is obtained from the patient, electronic health record, emergency department physician and patient's children[CE1.8]    History of Present Illness[CE1.2]   Kina Sears is a 89 year old female who presents with[CE1.1] complaints of shortness of breath and left leg edema. Patient with a history of heart failure and a recent complicated hospitalization at [facility]. (see below) . Patient states she became short of breath last night and was not able to sleep. She was feeling more short of breath and worried about her swelling and wanted to come to the emergency room.[CE1.8]     Copied from discharge Summary from [facility].[CE1.3]   Kina Sears is a 89 y.o. female with past medical history significant for polio with residual right-sided deficits, CHF, hypertension, GERD who presented to [facility] on 10/1/18 with left lower extremity swelling, erythema, and pain. Given the appearance of patient's left lower extremity and elevated leukocytosis to 20.5 on admission the likely cause of her symptoms is cellulitis. Left lower extremity ultrasound was negative for DVT. She was initially started on IV vancomycin and IV Zosyn in the emergency department and then narrowed to IV Ancef. She had slow improvement of her erythema and swelling with IV antibiotics; however on hospital day 4 after being switched to Keflex she had an increase in her leukocytosis.    Other sources of infection were considered and UA and urine culture were done due to having a Rico catheter placed on admission for urinary incontinence. Urine culture grew Pseudomonas sensitive to ciprofloxacin. The Rico catheter was removed and patient no longer had urinary retention, though did have a few bouts of incontinence. Until sensitivities returned patient continued on IV Zosyn. On  10/9/2018 sensitivities are returned showing Pseudomonas was sensitive to ciprofloxacin.    On day of discharge she was sent out on Keflex 500 mg twice daily for the cellulitis to continue 3 days further for a 10-day course. Additionally she was discharged with 3 days further of a 5-day course of antibiotics with ciprofloxacin twice daily for her Pseudomonas UTI.    She continued to have intermittent throbbing pain throughout her hospitalization in that left lower extremity; however, patient's son stated this was an ongoing issue even prior to hospitalization that they have been working with. They are concerned she may need a higher level of care.    Patient had also sustained a mechanical fall onto her right side the morning of admission. She was ambulating in her apartment at her assisted living facility and her walker got away from her and she fell onto her right side. She did not hit her head and did not have loss of consciousness. Extensive imaging of the right side shows degenerative changes but was negative for fractures of the right lower extremity, pelvis, shoulder. CT had showed no evidence of intracranial bleed or trauma.    Urinary retention: Patient had issues with urinary retention during her hospitalization, likely related to her feeling unsafe to get out of bed and transfer to the commode due to pain in her left lower extremity from her cellulitis.     Acute kidney injury: Patient presented with creatinine of 1.17 from her baseline of 0.8-1.0. Likely related to dehydration/poor oral intake in the day prior to admission and her coronary retention. MAGDALENO resolved with IV fluids.    Pressure ulcers: 9 erythematous lesions were noted on patient's buttocks upon admission, which represented several stage II and unstageable pressure ulcers. She was treated with calmoseptine ointment and Aquaphor applied directly to these lesions with frequent position changes. This should be continued outpatient.    Unequal  pupils: On admission it was noted that her right pupil was not reactive to light, staying at about 4 mm. Patient denies vision symptoms or eye pain. Vision intact and equal bilaterally on exam. Does have a history of bilateral cataract surgery, so it is possible that this is a change related to that. Recommend follow-up evaluation with primary care provider.    History of hypertension: PTA lisinopril was initially held due to MAGDALENO, then due to hypotension. Restarted after these resolved.    History of heart failure with reduced ejection fraction: Echocardiogram 1/17/17 showed LVEF 25% with moderate mitral regurgitation. PTA Lasix was initially held due to MAGDALENO, then due to hypotension. Restarted after these resolved.     History of atrial fibrillation: Regular rate on examination. PTA Toprol-XL was continued for rate control.    History of GERD: PTA omeprazole was continued.    Hx of Gout: Started on allopurinol as an outpatient. Consider restarting this.    Incidental imaging findings: Outpatient workup for incidental imaging finding such as left thyroid nodule as well as femur bone lesion seen on x-ray should be considered. Discussed with patient utility of further workup.[CE1.8]       Past Medical History[CE1.2]    I have reviewed this patient's medical history and updated it with pertinent information if needed.[CE1.8]   Past Medical History:   Diagnosis Date     GENERAL OSTEOARTHROSIS 4/8/2005 April 8, 2005 - injection rt knee.     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 Infantile paralysis, possibly polio.[CE1.11]     Discharge Diagnoses from [facility] 10/14:   Cellulitis of the left lower extremity  Catheter associated UTI growing Pseudomonas sensitive to ciprofloxacin  Mechanical Fall  Iron deficiency anemia, unspecified iron deficiency anemia type  MAGDALENO  (acute kidney injury) (H)  Incidental left thyroid nodule.  Femur bone lesion  Urinary incontinence  Sacral pressure ulcers  History of hypertension  History of CHF  History of GERD  History of Gout[CE1.8]      Past Surgical History[CE1.2]   I have reviewed this patient's surgical history and updated it with pertinent information if needed.[CE1.8]  Past Surgical History:   Procedure Laterality Date     ANGIOPLASTY  7/2006    LAD Coronary artery     EYE SURGERY      cataract     SURGICAL HISTORY OF -       Cholecystectomy[CE1.11]       Prior to Admission Medications   Prior to Admission Medications   Prescriptions Last Dose Informant Patient Reported? Taking?   Acetaminophen (TYLENOL PO) 10/13/2018 at 2030  Yes Yes   Sig: Take 500 mg by mouth 4 times daily   Calcium Carb-Cholecalciferol 500-200 MG-UNIT TABS 10/13/2018 at 1600  Yes Yes   Sig: Take 1 tablet by mouth daily   Furosemide (LASIX PO) 10/14/2018 at 0800  Yes Yes   Sig: Take 20 mg by mouth daily   ferrous sulfate (IRON) 325 (65 Fe) MG tablet 10/13/2018 at 0800  Yes Yes   Sig: Take 325 mg by mouth daily (with breakfast)   fluticasone (FLONASE) 50 MCG/ACT nasal spray Unknown at Unknown time  No No   Sig: Spray 2 sprays into both nostrils daily As needed for nasal congestion.   lisinopril (PRINIVIL/ZESTRIL) 20 MG tablet 10/14/2018 at 0800  No Yes   Sig: TAKE ONE TABLET BY MOUTH EVERY DAY   metoprolol succinate (TOPROL-XL) 25 MG 24 hr tablet 10/14/2018 at 0800  No Yes   Sig: TAKE ONE TABLET BY MOUTH EVERY DAY   nitroglycerin (NITROSTAT) 0.4 MG SL tablet Unknown at Unknown time  No No   Sig: Place 1 tablet (0.4 mg) under the tongue See Admin Instructions EVERY 5 MIN AS NEEDED, UP TO 3 PER EPISODE   nystatin (MYCOSTATIN) 152105 UNIT/GM POWD Unknown at Unknown time  Yes No   Sig: Apply topically daily as needed   omeprazole (PRILOSEC) 20 MG capsule 10/13/2018 at 0730  No Yes   Sig: Take 1 capsule (20 mg) by mouth daily   order for DME   No No   Sig: Equipment being  ordered: 4 wheeled walker      Facility-Administered Medications: None     Allergies   Allergies   Allergen Reactions     Sulfa Drugs Rash       Social History[CE1.2]   I have reviewed this patient's social history and updated it with pertinent information if needed. Kina Sears[CE1.8]  reports that she has never smoked. She has never used smokeless tobacco. She reports that she does not drink alcohol or use illicit drugs.[CE1.11]    Patient was residing in [facility] until the last hospitalization. Patient was discharged to [facility] with the plan to transition to long term care with palliative care or hospice.[CE1.3]     Family History[CE1.2]   I have reviewed this patient's family history and updated it with pertinent information if needed.[CE1.8]   Family History   Problem Relation Age of Onset     C.A.D. Mother      Hypertension Mother      Arthritis Father      Rheumatoid     Arthritis Sister      Cancer - colorectal Sister      Breast Cancer Sister      C.A.D. Brother      Diabetes Brother      Cerebrovascular Disease Brother      Prostate Cancer No family hx of[CE1.11]        Review of Systems[CE1.2]   The 10 point Review of Systems is negative other than noted in the HPI or here.[CE1.8]     Physical Exam   Temp: 97.9  F (36.6  C) Temp src: Oral BP: 161/65 Pulse: 75 Heart Rate: 75 Resp: 24 SpO2: 97 % O2 Device: None (Room air)[CE1.2]    Vital Signs with Ranges[CE1.1]  Temp:  [97.1  F (36.2  C)-97.9  F (36.6  C)] 97.9  F (36.6  C)  Pulse:  [75-90] 75  Heart Rate:  [75-93] 75  Resp:  [20-24] 24  BP: (101-161)/(50-83) 161/65  SpO2:  [92 %-97 %] 97 %  129 lbs 13.62 oz    Data[CE1.2]   Data reviewed today:  I personally reviewed[CE1.1] EKG, C X-ray,[CE1.8]    Recent Labs  Lab 10/14/18  0823   WBC 10.1   HGB 9.6*   MCV 89         POTASSIUM 3.6   CHLORIDE 108   CO2 21   BUN 12   CR 0.62   ANIONGAP 11   ANNA 8.1*   *   ALBUMIN 2.2*   PROTTOTAL 6.2*   BILITOTAL 0.2   ALKPHOS 87    ALT 14   AST 19   TROPI 0.038       Recent Results (from the past 24 hour(s))   XR Chest 2 Views    Narrative    XR CHEST 2 VW   10/14/2018 10:42 AM     HISTORY: Shortness of breath;     COMPARISON: Film dated 2/13/2016    FINDINGS: Heart is mildly enlarged. There are bilateral pleural  effusions. There are associated bibasilar opacities compatible with  infiltrate or atelectasis.. Mild pulmonary congestion.  The bones and  soft tissues are unremarkable.      Impression    IMPRESSION: Cardiomegaly, bilateral pleural effusions and associated  basilar infiltrate or atelectasis. These findings could be due to  congestive heart failure.        MARIAH QUEZADA MD[CE1.2]           Review of Systems   Respiratory: Positive for shortness of breath.    Gastrointestinal: Positive for diarrhea.   Genitourinary: Positive for frequency.   Skin: Positive for itching and rash.   Neurological: Positive for weakness.   All other systems reviewed and are negative.      Physical Exam   Constitutional: She appears well-developed.   HENT:   Head: Normocephalic. Head is with abrasion.       Eyes: Pupils are equal, round, and reactive to light.   Neck: No JVD present.   Cardiovascular: Normal rate, regular rhythm, S1 normal, S2 normal and intact distal pulses.    Pulmonary/Chest: Effort normal. No stridor. She has decreased breath sounds in the right lower field and the left lower field.   Abdominal: Soft. Bowel sounds are normal. There is no tenderness.   Genitourinary:   Genitourinary Comments: Redness surrounding vitaly area and rectal area   Musculoskeletal: She exhibits edema, tenderness and deformity.        Right shoulder: She exhibits swelling and deformity.   Arthritic joint changes   Neurological: She is alert.   Anxious and forgetful   Skin: Skin is warm and dry. Bruising and ecchymosis noted. There is erythema. There is pallor.        Psychiatric: Her mood appears anxious. Cognition and memory are impaired.   Nursing note and  vitals reviewed.[CE1.12]    Pt.presented with many open wounds on her left lateral,posterior and anterior leg area. Note wound measurements as ordered.   The 3 open areas on anterior left leg measure 1/4 cm diam.   Left outer lateral leg open wound is 1/2cm.  2 open areas left posterior leg below his left knee 1 measures 3cm length and 1 cm width.  2nd one in that area is approx 1/2 cm diam.   Open areas to buttock region starting in the right buttock the one that is most distal is 2cm length by 3 cm width the one in the coccyx is 3cm length by 1.5cm width.  Now left buttock the area starting most distal are 2 open ones 1/2 cm diam with the one most inner to buttock crease to measure 3 cm length by 1cm width.  Open areas which we measured to left buttock area most proximal  measuring 3 cm by 1cm the one most distal left buttock measures 1 cm.  Both of[CE1.13] patient[CE1.9] posterior heels have non-blanchable areas with suspected deep tissue injuries.  Left heel measures 1.5 cm and skin is peeling on heel and pts.left lower leg area.  The slightly bluish non-blanchable area to right posterior heel measures 1cm.  She has scabbed area below left nostril and bruises to left upper arm area.  Her right foot is slightly cooler than left foot and 3[CE1.13]+[CE1.9] pitting edema.  Right foot 4[CE1.13]+[CE1.9] pitting edema and warm.  Able to palpate dorsal pedal pulses bilaterally.[CE1.13]  Ordered[CE1.3] Barrier cream[CE1.13] to be[CE1.3] applied to buttock area.   [CE1.13]     Revision History        User Key Date/Time User Provider Type Action    > CE1.3 10/14/2018  4:20 PM Whitney Argueta CNP Nurse Practitioner Sign     CE1.9 10/14/2018  4:02 PM Whitney Argueta CNP Nurse Practitioner Sign     CE1.13 10/14/2018  2:59 PM Whitney Argueta CNP Nurse Practitioner Sign     CE1.7 10/14/2018  2:57 PM Whitney Argueta CNP Nurse Practitioner Sign     CE1.11 10/14/2018  2:10 PM Whitney Argueta, CNP Nurse  Practitioner      CE1.8 10/14/2018  2:09 PM Whitney Argueta CNP Nurse Practitioner      CE1.10 10/14/2018  2:01 PM Whitney Argueta CNP Nurse Practitioner      CE1.6 10/14/2018  1:58 PM Whitney Argueta CNP Nurse Practitioner      CE1.5 10/14/2018  1:54 PM Whitney Argueta CNP Nurse Practitioner      CE1.4 10/14/2018  1:47 PM Whitney Argueta CNP Nurse Practitioner      CE1.2 10/14/2018  1:44 PM Whitney Argueta CNP Nurse Practitioner      CE1.1 10/14/2018  1:42 PM Whitney Argueta CNP Nurse Practitioner      CE1.12 10/14/2018  1:29 PM Whitney Argueta CNP Nurse Practitioner                   Discharge Summaries     No notes of this type exist for this encounter.         Consult Notes      Consults by Jorge Jay MD at 10/16/2018  2:07 PM     Author:  Jorge Jay MD Service:  Surgery Author Type:  Physician    Filed:  10/16/2018  2:07 PM Date of Service:  10/16/2018  2:07 PM Creation Time:  10/16/2018  2:03 PM    Status:  Signed :  Jorge Jay MD (Physician)         [facility] Surgery Consult    Kina Sears MRN# [id number]   Age: 89 year old YOB: 1923     Date of Admission:  10/14/2018    Reason for consult: Left leg and sacral wounds       Requesting physician: Dr. Martinez       Level of consult: Consult, follow and place orders           Impression and Plan:   Impression:   Pt with sacral and left leg wounds.  Sacral wounds 2nd to pressure and leg wounds 2nd to edema.  All superficial      Plan:   Mepilex to all wounds.  Cont offloading sacrum           Chief Complaint:   Sacral and left leg wounds     History is obtained from the patient and electronic health record         History of Present Illness:   This 89 year old female admitted for CHF and was found to have sacral and left leg wounds.  No complaints.  I am asked to see her for her wounds         Past Medical History:     Past Medical History:   Diagnosis Date     DNR no code (do not  resuscitate) 10/14/2018     GENERAL OSTEOARTHROSIS 4/8/2005 April 8, 2005 - injection rt knee.     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             Past Surgical History:     Past Surgical History:   Procedure Laterality Date     ANGIOPLASTY  7/2006    LAD Coronary artery     EYE SURGERY      cataract     SURGICAL HISTORY OF -       Cholecystectomy             Social History:     Social History   Substance Use Topics     Smoking status: Never Smoker     Smokeless tobacco: Never Used     Alcohol use No             Family History:     Family History   Problem Relation Age of Onset     C.A.D. Mother      Hypertension Mother      Arthritis Father      Rheumatoid     Arthritis Sister      Cancer - colorectal Sister      Breast Cancer Sister      C.A.D. Brother      Diabetes Brother      Cerebrovascular Disease Brother      Prostate Cancer No family hx of               Allergies:     Allergies   Allergen Reactions     Sulfa Drugs Rash             Medications:     Current Facility-Administered Medications   Medication     acetaminophen (TYLENOL) tablet 650 mg     furosemide (LASIX) tablet 20 mg     lidocaine (LMX4) kit     lidocaine 1 % 1 mL     lisinopril (PRINIVIL/ZESTRIL) tablet 20 mg     metoprolol succinate (TOPROL-XL) 24 hr tablet 25 mg     miconazole (MICATIN; MICRO GUARD) 2 % powder     naloxone (NARCAN) injection 0.1-0.4 mg     nitroGLYcerin (NITROSTAT) sublingual tablet 0.4 mg     omeprazole (priLOSEC) CR capsule 20 mg     potassium chloride (KLOR-CON) Packet 20-40 mEq     potassium chloride 10 mEq in 100 mL intermittent infusion with 10 mg lidocaine     potassium chloride 10 mEq in 100 mL sterile water intermittent infusion (premix)     potassium chloride 20 mEq in 50 mL intermittent infusion     potassium chloride SA  "(K-DUR/KLOR-CON M) CR tablet 20-40 mEq     sodium chloride (PF) 0.9% PF flush 3 mL     sodium chloride (PF) 0.9% PF flush 3 mL     sodium chloride (PF) 0.9% PF flush 3 mL     sodium chloride (PF) 0.9% PF flush 3 mL     traMADol (ULTRAM) half-tab 25 mg             Review of Systems:   The review of systems was positive for the following findings.  Sacrum.  The remainder of the review of systems was unremarkable.          Physical Exam:   All vitals have been reviewed  Blood pressure 136/44, pulse 87, temperature 98.9  F (37.2  C), temperature source Oral, resp. rate 23, height 1.499 m (4' 11\"), weight 56.9 kg (125 lb 7.1 oz), SpO2 90 %, not currently breastfeeding.    Intake/Output Summary (Last 24 hours) at 10/16/18 1403  Last data filed at 10/16/18 0849   Gross per 24 hour   Intake             1200 ml   Output             1105 ml   Net               95 ml     SHEENT examination revealed NC/AT, EOMI, (-)icterus.  Examination of the chest revealed CTA  Examination of the heart revealed S1, S2  Examination of the abdomen revealed Soft, non tender, non distended  The neuromuscular examination was AAOx2, moves all 4 ext but weak.  Back; 4 superficial wound measuring 1x1 to 3x2cm.  Ext: 1x0.3 calf wound clean.          Data:   All laboratory data reviewed  Results for orders placed or performed during the hospital encounter of 10/14/18 (from the past 24 hour(s))   Potassium   Result Value Ref Range    Potassium 4.4 3.4 - 5.3 mmol/L   Basic metabolic panel   Result Value Ref Range    Sodium 139 133 - 144 mmol/L    Potassium 4.2 3.4 - 5.3 mmol/L    Chloride 103 94 - 109 mmol/L    Carbon Dioxide 27 20 - 32 mmol/L    Anion Gap 9 3 - 14 mmol/L    Glucose 118 (H) 70 - 99 mg/dL    Urea Nitrogen 13 7 - 30 mg/dL    Creatinine 0.51 (L) 0.52 - 1.04 mg/dL    GFR Estimate >90 >60 mL/min/1.7m2    GFR Estimate If Black >90 >60 mL/min/1.7m2    Calcium 7.5 (L) 8.5 - 10.1 mg/dL   Hemoglobin   Result Value Ref Range    Hemoglobin 9.6 (L) " 11.7 - 15.7 g/dL   Blood gas venous   Result Value Ref Range    Ph Venous 7.55 (H) 7.32 - 7.43 pH    PCO2 Venous 33 (L) 40 - 50 mm Hg    PO2 Venous 58 (H) 25 - 47 mm Hg    Bicarbonate Venous 29 (H) 21 - 28 mmol/L    Base Excess Venous 6.4 mmol/L    FIO2 21         Jorge Jay MD, FACS[RK1.1]          Revision History        User Key Date/Time User Provider Type Action    > RK1.1 10/16/2018  2:07 PM Jorge Jay MD Physician Sign            Consults by Yanet Lopez RN at 10/15/2018 10:35 AM     Author:  Yanet Lopez RN Service:  (none) Author Type:      Filed:  10/15/2018  1:52 PM Date of Service:  10/15/2018 10:35 AM Creation Time:  10/15/2018 10:33 AM    Status:  Addendum :  Yanet Lopez RN ()     Consult Orders:    1. Social Work IP Consult [[id number]] ordered by Ronald Nazario MD at 10/14/18 8602                Care Transition Initial Assessment - RN  Reason For Consult: discharge planning   Met with: Patient.    DATA   Principal Problem:    Acute on chronic systolic congestive heart failure (H)  Active Problems:    Hyperlipidemia LDL goal <100    Hypertension goal BP (blood pressure) < 140/90    Decubitus ulcer of coccygeal region    Ulcer of left lower extremity, limited to breakdown of skin (H)    Ulcer of heel, left, with unspecified severity (H)    Systolic heart failure (H)    DNR no code (do not resuscitate)    Personal history of urinary tract infection - pseudomonas    Urine retention    Hx of acute renal failure    History of atrial fibrillation    History of gout       Primary Care Clinic Name: [facility]     Contact information and PCP information verified: No    ASSESSMENT  Cognitive Status: awake and alert.       Resources List: Skilled Nursing Facility     Lives With: facility resident  Living Arrangements: extended care facility     Description of Support System: Supportive, Involved   Who is your support system?: Facility resident(s)/Staff    Support Assessment: Adequate family and caregiver support   Insurance Concerns: No Insurance issues identified      This writer met with pt, introduced self and role. Discussed discharge planning and Medicare guidelines in regards to home care and SNF benefits. Patient is currently residing at [facility] (Phone: [phone] Fax: [phone]) in TCU. Her plan is to return there. She is not on a bed hold but will not require one if she returns within 24 hours.[JM1.1]     1400: Pt is on a bed hold and will likely return to [facility] 10/16[JM1.2]      PLAN    Return to [facility] (Phone: [phone] Fax: [phone])        Discharge Planner   Discharge Plans in progress: Return to [facility]  Barriers to discharge plan: clinical improvement  Follow up plan: per MD recommendation       Entered by: ELISA HOLM 10/15/2018 10:33 AM         Yanet Lopez MSN, RN, Care Coordinator  [facility] [phone]  [facility] [phone][JM1.1]     Revision History        User Key Date/Time User Provider Type Action    > JM1.2 10/15/2018  1:52 PM Yanet Lopez RN Case Manager Addend     JM1.1 10/15/2018 10:35 AM Yanet Lopez RN Case Manager Sign            Consults by Elly Thurston RD at 10/15/2018 11:30 AM     Author:  Elly Thurston RD Service:  Nutrition Author Type:  Registered Dietitian    Filed:  10/15/2018 11:30 AM Date of Service:  10/15/2018 11:30 AM Creation Time:  10/15/2018 11:27 AM    Status:  Signed :  Elly Thurston RD (Registered Dietitian)     Consult Orders:    1. Nutrition Services Adult IP Consult [[id number]] ordered by Whitney Argueta CNP at 10/14/18 1424                Nutrition Note:     RD consulted to provide 2 gm Na diet education. Noted that patient is on a Regular diet. Spoke with RN who reports patient does not need education at this time and that patient resides at SNF who provides meals for her. Please re-consult RD if  education is needed or there are changes in nutrition status.     Elly Thurston RDN, LD  Clinical Dietitian  [phone][KL1.1]       Revision History        User Key Date/Time User Provider Type Action    > KL1.1 10/15/2018 11:30 AM Elly Thurston RD Registered Dietitian Sign                     Progress Notes - Physician (Notes for yesterday and today)      Progress Notes by Wendy Martinez MD at 10/15/2018 11:33 AM     Author:  Wendy Martinez MD Service:  Hospitalist Author Type:  Physician    Filed:  10/15/2018  6:45 PM Date of Service:  10/15/2018 11:33 AM Creation Time:  10/15/2018 11:33 AM    Status:  Signed :  Wendy Martinez MD (Physician)         [facility] Progress Note[EP1.1]          Assessment and Plan:   Assessment:   Principal Problem:    Acute on chronic systolic congestive heart failure (H)    Assessment: Patient with a known ejection fraction of 25% from an echocardiogram performed in 2017.  Family is not desiring repeat echocardiogram at this time with consideration of transition to comfort care if her time at the TCU following this discharge does not go well.  Patient has responded well to the IV Lasix given over 19 she is weaned to room air with resolution of her shortness of breath and improvement of her lower extremity edema    P[EP1.2]familia:   will transition to oral Lasix 20 mg daily.  Will proceed with voiding trial  and anticipate discharge back to the TCU later today versus tomorrow once it is known if she needs ongoing Rico placement at time of discharge.  If patient is still hospitalized tomorrow, with repeat basic metabolic panel to further evaluate renal function and potassium.[EP1.3]    Active Problems:    Urine retention    Assessment:[EP1.2] Thought likely secondary to oxybutynin which was started approximately 2 months ago with issues of retention at her recent stay at [facility] complicated by an UTI for which she  completed Keflex and cipro.  Presented with recurrent retention and 800 cc in her bladder[EP1.3]    Plan:[EP1.2] Did discuss with patient and her son the pros and cons of ongoing Rico administration, especially as she is just getting over a bladder infection.  They would like to try to remove the Rico and perform trial of voiding.  They are aware that if she is unable to successfully void or has significant retention development will need to replace the Rico and have outpatient urology follow-up.[EP1.3]        Hyperlipidemia LDL goal <100    Assessment:[EP1.2] Chronic, not receiving treatment[EP1.3]    Plan:[EP1.2] Outpatient follow-up is recommended[EP1.3]      Hypertension goal BP (blood pressure) < 140/90    Assessment:[EP1.2]  On lisinopril and metoprolol at baseline, held secondary to IV Lasix administration.  Blood pressures are slightly elevated this morning[EP1.3]    Plan:[EP1.2] As restart metoprolol and lisinopril as well as ongoing Lasix diuresis as above and monitor for blood pressure stability.[EP1.3]      Decubitus ulcer of coccygeal region    Assessment:[EP1.2] Present at time of hospitalization[EP1.3]    Plan:[EP1.2] We will attempt to have the wound care nurse see this later today for recommendations[EP1.3]      Ulcer of left lower extremity, limited to breakdown of skin (H)    Assessment:[EP1.2] Present at time of hospitalization[EP1.3]    Plan:[EP1.2] Proceed with wound care referral[EP1.3]      Ulcer of heel, left, with unspecified severity (H)    Assessment:[EP1.2] Present at time of hospitalization[EP1.3]    Plan:[EP1.2] Proceed with wound care referral[EP1.3]      Systolic heart failure (H)    Assessment:[EP1.2] With acute worsening as above[EP1.3]    Plan:[EP1.2] Proceed with plan as above[EP1.3]      DNR no code (do not resuscitate)    Assessment:[EP1.2] Noted[EP1.3]    Plan:[EP1.2] Will honor[EP1.3]      Personal history of urinary tract infection - pseudomonas    Assessment:[EP1.2]  Recently completed Keflex and ciprofloxacin for antibiotic course completion.  Repeat UA showed no concern for ongoing infection[EP1.3]    Plan:[EP1.2] Continue to monitor[EP1.3]      Hx of acute renal failure    Assessment:  No growth on culture was present during previous hospitalization but no evidence of failure during this stay    Plan:[EP1.2] Continue to monitor for stability, especially with ongoing diuresis efforts[EP1.3]      History of atrial fibrillation    Assessment:[EP1.2] Currently normal sinus rhythm[EP1.3]    Plan:[EP1.2] Continue with metoprolol and monitor[EP1.3]      History of gout    Assessment:[EP1.2] No medication recently without evidence of acute flare[EP1.3]    Plan:[EP1.2] Continue to monitor[EP1.3]       # Pain Assessment:  Current Pain Score 10/15/2018   Patient currently in pain? denies   Pain score (0-10) -   Pain location -   Pain descriptors -[EP1.1]   Kina cuellar pain level was assessed and she currently denies pain.[EP1.2]        Disposition Plan   Discharge Planning and Disposition    1.  Clinically stable enough to begin D/C disposition and planning:[EP1.1] Yes      2.  Patient Goals for Discharge:      A.  Clinical - The following need to be achieved for discharge:  stable functional status, stable oxygen requirement and stable vital signs, determination of ongoing need for ramírez placement    B.  Diagnostic testing and/or procedures completed and all applicable results available before discharge - cultures, endoscopy, imaging, cardiac testing:  Not Met    C.  Anticipated post discharge treatment plan formulated and the following needs have been identified: rehab (PT, OT, ST)    3. Potential Barriers to Discharge:  PT evaluation, voiding trial    4. Recommended Disposition:  Transitional Care Unit - return to[EP1.2]         Entered: Wendy Martinez 10/15/2018, 11:33 AM       VTE:[EP1.1]  Observation status[EP1.2]  Code Status:[EP1.1]  DNR/DNI[EP1.2]        Interval  "History:[EP1.1]   Starting to improve with patient voiding well following Lasix overnight and weight decreased just under 5 lbs.  No need for O2 supplementation this morning and patient's breathing has returned to baseline.  Vital signs stable and tolerated diuresis well.  Eating and voiding well.  Tolerating medications without significant side effects.  Patient is worried about her ability to participate in rehab.[EP1.2]           Significant Problems:[EP1.1]     Past Medical History:   Diagnosis Date     DNR no code (do not resuscitate) 10/14/2018     GENERAL OSTEOARTHROSIS 4/8/2005 April 8, 2005 - injection rt knee.     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 rate 20, height 1.499 m (4' 11\"), weight 56.7 kg (125 lb), SpO2 92 %, not currently breastfeeding.[EP1.1]  Constitutional:   awake, alert, cooperative, no apparent distress, and appears stated age     Lungs:   No increased work of breathing, good air exchange, clear to auscultation bilaterally, no crackles or wheezing     Cardiovascular:   Normal apical impulse, regular rate and rhythm     Abdomen:   Bowel sounds present, abdomen soft and non-tender     Musculoskeletal:   1-2+ pitting edema noted bilaterally, improved from previous     Neurologic:   Awake, alert, oriented to name, place and time.  Does seem slightly confused about the timing of events (how many days she has been at various places) and very nervous about the future but is aware of much of her recent medical history and what the plan has been     Skin:   Ongoing skin ulcerations noted on her left leg and buttocks, which were present at time of admission[EP1.2]           "   Data:[EP1.1]   All laboratory data reviewed[EP1.2]    Attestation:  I have reviewed today's vital signs, notes, medications, labs and imaging.     Electronically Signed:  Wendy Martinez MD    Note: Chart documentation done in part with Dragon Voice Recognition software. Although reviewed after completion, some word and grammatical errors may remain.[EP1.1]          Revision History        User Key Date/Time User Provider Type Action    > EP1.3 10/15/2018  6:45 PM Wendy Martinez MD Physician Sign     EP1.4 10/15/2018  4:44 PM Wendy Martinez MD Physician      EP1.2 10/15/2018  4:41 PM Wendy Martinez MD Physician      EP1.1 10/15/2018 11:33 AM Wendy Martinez MD Physician                   Procedure Notes     No notes of this type exist for this encounter.         Progress Notes - Therapies (Notes from 10/13/18 through 10/16/18)      Progress Notes by Petty Torres PT at 10/15/2018  6:56 PM     Author:  Petty Torres PT Service:  (none) Author Type:  Physical Therapist    Filed:  10/15/2018  6:56 PM Date of Service:  10/15/2018  6:56 PM Creation Time:  10/15/2018  6:56 PM    Status:  Signed :  Petty Torres PT (Physical Therapist)          10/15/18 1430   Quick Adds   Type of Visit Initial PT Evaluation       Present no   Living Environment   Lives With facility resident   Living Arrangements extended care facility   Home Accessibility no concerns   Transportation Available van, wheelchair accessible   Self-Care   Dominant Hand left   Usual Activity Tolerance poor   Current Activity Tolerance poor   Regular Exercise no   Equipment Currently Used at Home walker, rolling  (four wheeled walker)   Functional Level Prior   Ambulation 3-->assistive equipment and person   Transferring 3-->assistive equipment and person   Toileting 3-->assistive equipment and person   Bathing 3-->assistive equipment and person   Dressing  "3-->assistive equipment and person   Eating 0-->independent   Communication 0-->understands/communicates without difficulty   Swallowing 0-->swallows foods/liquids without difficulty   Cognition 1 - attention or memory deficits   Fall history within last six months yes   Number of times patient has fallen within last six months 1   Which of the above functional risks had a recent onset or change? ambulation;transferring   General Information   Onset of Illness/Injury or Date of Surgery - Date 10/14/18   Referring Physician Dr. Martinez   Patient/Family Goals Statement Patient unable to indicate \"I don't know\"   Precautions/Limitations fall precautions   Weight-Bearing Status - LUE full weight-bearing   Weight-Bearing Status - RUE full weight-bearing   Weight-Bearing Status - LLE full weight-bearing   Weight-Bearing Status - RLE full weight-bearing   General Observations Patient left sidelying, highly resistance to mobilization and fearful of falling. Requesting PT get more assistance and fixated that PT will make her walk, while PT only requested to see bed mobility.    General Info Comments PT orders: eval and treat patient unwilling to ambulate. Activity orders: ambulate with asssitance, up with assistance   Cognitive Status Examination   Orientation person;time   Level of Consciousness alert;confused   Follows Commands and Answers Questions able to follow single-step instructions;25% of the time   Personal Safety and Judgment impaired   Memory impaired   Pain Assessment   Patient Currently in Pain Yes, see Vital Sign flowsheet  (right leg pain)   Integumentary/Edema   Integumentary/Edema Comments Bilat pressure relieving boots. Wounds across posterior surface of hips   Posture    Posture Kyphosis;Forward head position;Protracted shoulders   Range of Motion (ROM)   ROM Comment Patient unwilling to demonstrate with the left. Right UE flexion to 110 and full elbow ROM. Right knee 10-75. right hip unable to extend " and resistant to flexio greater than 75 degrees   Strength   Strength Comments Unable to assess as patient unwilling to follow commands for testing out of fear   Bed Mobility   Bed Mobility Bed mobility skill: Rolling/Turning;Bed mobility skill: Scooting/Bridging   Bed Mobility Skill: Rolling/Turning   Level of [place]: Rolling/Turning maximum assist (25% patients effort)   Physical/Nonphysical Assist: Rolling/Turning 1 person assist   Assistive Device: Rolling/Turning bed rails   Bed Mobility Skill: Scooting/Bridging   Level of [place]: Scooting/Bridging maximum assist (25% patients effort)   Physical/Nonphysical Assist: Scooting/Bridging 1 person assist   Assistive Device: Scooting/Bridging bed rails   Transfer Skills   Transfer Comments Patient refused due to fear of falling and right leg pain   Gait   Gait Comments Unable   Balance   Balance Comments Unable to assess, attempted to sit up in bed however patient progressive resisted with trunk extension   Sensory Examination   Sensory Perception Comments Unable to asses   Coordination   Coordination Comments Unable to assess   Muscle Tone   Muscle Tone Comments Low tone throughout   General Therapy Interventions   Planned Therapy Interventions bed mobility training;ROM;strengthening   Clinical Impression   Criteria for Skilled Therapeutic Intervention yes, treatment indicated   PT Diagnosis joint stiffness, muscle weakness, fear, impaired balance   Influenced by the following impairments Lack of physical activity due to hospital stay and sores   Functional limitations due to impairments impaired functional mobility, decreased activity tolerance, high risk for complications due to lack of mobility   Clinical Presentation Evolving/Changing   Clinical Presentation Rationale Patient demonstrates change in mobility from baseline and high fear of falling unwiilling to follow commands for mobility sequencing. Patient's fear and pain currently limits her  "mobilizatino, thus with management and education improve some degree of improvement.    Clinical Decision Making (Complexity) Moderate complexity   Therapy Frequency` 5 times/week   Predicted Duration of Therapy Intervention (days/wks) 1-2 days    Anticipated Equipment Needs at Discharge (Defer to placement)   Anticipated Discharge Disposition Transitional Care Facility  (vs. Long term care facility)   Risk & Benefits of therapy have been explained Yes   Patient, Family & other staff in agreement with plan of care Yes   Clinical Impression Comments Patient unwilling to actively participate, should this change and she be willing to follow commands for bed mobility and limb range of motion she may have potential to make progress towards greater mobility. At this time based on refusal to participate long term care facility is appropriate for safe discharge plan.   [facility] [employer]  \"6 Clicks\"   2016, Trustees of [facility], under license to [employer].  All rights reserved.   6 Clicks Short Forms Basic Mobility Inpatient Short Form   [facility] AM-PAC  \"6 Clicks\" V.2 Basic Mobility Inpatient Short Form   1. Turning from your back to your side while in a flat bed without using bedrails? 2 - A Lot   2. Moving from lying on your back to sitting on the side of a flat bed without using bedrails? 1 - Total   3. Moving to and from a bed to a chair (including a wheelchair)? 1 - Total   4. Standing up from a chair using your arms (e.g., wheelchair, or bedside chair)? 1 - Total   5. To walk in hospital room? 1 - Total   6. Climbing 3-5 steps with a railing? 1 - Total   Basic Mobility Raw Score (Score out of 24.Lower scores equate to lower levels of function) 7   Total Evaluation Time   Total Evaluation Time (Minutes) 24     Thank you for your referral.    Petty Torres, PT, DPT, ATC    [facility]    O: [phone]  E: [email or web address]]         Revision History        User Key " "Date/Time User Provider Type Action    > LM1.1 10/15/2018  6:56 PM Petty Torres, PT Physical Therapist Sign                                                      INTERAGENCY TRANSFER FORM - LAB / IMAGING / EKG / EMG RESULTS   10/14/2018                       [address] SURGICAL: [phone]            Unresulted Labs (24h ago through future)    Start       Ordered    Unscheduled  Potassium  CONDITIONAL X 1,   Routine     Comments:  RN to release order IF pronounced diuresis response greater than 1000 mL or new onset of arrhythmia.    10/14/18 1424    Unscheduled  Potassium  (Potassium Replacement - \"Standard\" - For K levels less than 3.4 mmol/L - UU,UR,UA,RH,SH,PH,WY )  CONDITIONAL (SPECIFY),   Routine     Comments:  Obtain Potassium Level for these conditions:  *IF no potassium result within 24 hours before initiation of order set, draw potassium level with next lab collect.    *2 HOURS AFTER last IV potassium replacement dose and 4 hours after an oral replacement dose.  *Next morning after potassium dose.     Repeat Potassium Replacement if necessary.    10/15/18 0921         Lab Results - 3 Days      Basic metabolic panel [[id number]] (Abnormal)  Resulted: 10/16/18 0624, Result status: Final result    Ordering provider: Wendy Martinez MD  10/16/18 0000 Resulting lab: [facility]    Specimen Information    Type Source Collected On   Blood  10/16/18 0602          Components       Value Reference Range Flag Lab   Sodium 139 133 - 144 mmol/L  [facility]   Potassium 4.2 3.4 - 5.3 mmol/L  [facility]   Chloride 103 94 - 109 mmol/L  [facility]   Carbon Dioxide 27 20 - 32 mmol/L  [facility]   Anion Gap 9 3 - 14 mmol/L  [facility]   Glucose 118 70 - 99 mg/dL H [facility]   Urea Nitrogen 13 7 - 30 mg/dL  [facility]   Creatinine 0.51 0.52 - 1.04 mg/dL L [facility]   GFR Estimate >90 >60 mL/min/1.7m2  [facility]   Comment:  Non  GFR Calc   GFR Estimate If Black >90 >60 " mL/min/1.7m2  [facility]   Comment:  African American GFR Calc   Calcium 7.5 8.5 - 10.1 mg/dL L [facility]            Blood gas venous [[id number]] (Abnormal)  Resulted: 10/16/18 0610, Result status: Final result    Ordering provider: Wendy Martinez MD  10/16/18 0000 Resulting lab: [facility]    Specimen Information    Type Source Collected On   Blood  10/16/18 0602          Components       Value Reference Range Flag Lab   Ph Venous 7.55 7.32 - 7.43 pH H [facility]   PCO2 Venous 33 40 - 50 mm Hg L [facility]   PO2 Venous 58 25 - 47 mm Hg H [facility]   Bicarbonate Venous 29 21 - 28 mmol/L H [facility]   Base Excess Venous 6.4 mmol/L  [facility]   Comment:  Abnormal Result, Ref range: -7.7 to 1.9   FIO2 21   [facility]            Hemoglobin [[id number]] (Abnormal)  Resulted: 10/16/18 0608, Result status: Final result    Ordering provider: Wendy Martinez MD  10/16/18 0000 Resulting lab: [facility]    Specimen Information    Type Source Collected On   Blood  10/16/18 0602          Components       Value Reference Range Flag Lab   Hemoglobin 9.6 11.7 - 15.7 g/dL L [facility]            Potassium [[id number]]  Resulted: 10/15/18 1642, Result status: Final result    Ordering provider: Ronald Nazario MD  10/15/18 1158 Resulting lab: [facility]    Specimen Information    Type Source Collected On   Blood  10/15/18 1624          Components       Value Reference Range Flag Lab   Potassium 4.4 3.4 - 5.3 mmol/L  [facility]            Basic metabolic panel [[id number]] (Abnormal)  Resulted: 10/15/18 0549, Result status: Final result    Ordering provider: Whitney Argueta, CNP  10/15/18 0000 Resulting lab: [facility]    Specimen Information    Type Source Collected On   Blood  10/15/18 0529          Components       Value Reference Range Flag Lab   Sodium 142 133 - 144 mmol/L  [facility]   Potassium 3.3 3.4 - 5.3 mmol/L L [facility]   Chloride 106  94 - 109 mmol/L  [facility]   Carbon Dioxide 28 20 - 32 mmol/L  [facility]   Anion Gap 8 3 - 14 mmol/L  [facility]   Glucose 102 70 - 99 mg/dL H [facility]   Urea Nitrogen 13 7 - 30 mg/dL  [facility]   Creatinine 0.55 0.52 - 1.04 mg/dL  [facility]   GFR Estimate >90 >60 mL/min/1.7m2  [facility]   Comment:  Non  GFR Calc   GFR Estimate If Black >90 >60 mL/min/1.7m2  [facility]   Comment:  African American GFR Calc   Calcium 7.3 8.5 - 10.1 mg/dL L [facility]            CBC with platelets [[id number]] (Abnormal)  Resulted: 10/15/18 0532, Result status: Final result    Ordering provider: Whitney Argueta CNP  10/15/18 0000 Resulting lab: [facility]    Specimen Information    Type Source Collected On   Blood  10/15/18 0529          Components       Value Reference Range Flag Lab   WBC 5.7 4.0 - 11.0 10e9/L  [facility]   RBC Count 3.07 3.8 - 5.2 10e12/L L [facility]   Hemoglobin 8.4 11.7 - 15.7 g/dL L [facility]   Hematocrit 27.4 35.0 - 47.0 % L [facility]   MCV 89 78 - 100 fl  [facility]   MCH 27.4 26.5 - 33.0 pg  [facility]   MCHC 30.7 31.5 - 36.5 g/dL L [facility]   RDW 15.9 10.0 - 15.0 % H [facility]   Platelet Count 284 150 - 450 10e9/L  [facility]            Comprehensive metabolic panel [[id number]] (Abnormal)  Resulted: 10/14/18 1046, Result status: Final result    Ordering provider: Kourtney Cano MD  10/14/18 1029 Resulting lab: [facility]    Specimen Information    Type Source Collected On   Blood  10/14/18 0823          Components       Value Reference Range Flag Lab   Sodium 140 133 - 144 mmol/L  [facility]   Potassium 3.6 3.4 - 5.3 mmol/L  [facility]   Chloride 108 94 - 109 mmol/L  [facility]   Carbon Dioxide 21 20 - 32 mmol/L  [facility]   Anion Gap 11 3 - 14 mmol/L  [facility]   Glucose 123 70 - 99 mg/dL H [facility]   Urea Nitrogen 12 7 - 30 mg/dL  [facility]   Creatinine 0.62 0.52 - 1.04 mg/dL  [facility]   GFR Estimate >90 >60 mL/min/1.7m2  [facility]   Comment:  Non  GFR Calc   GFR Estimate  If Black >90 >60 mL/min/1.7m2  [facility]   Comment:  African American GFR Calc   Calcium 8.1 8.5 - 10.1 mg/dL L [facility]   Bilirubin Total 0.2 0.2 - 1.3 mg/dL  [facility]   Albumin 2.2 3.4 - 5.0 g/dL L [facility]   Protein Total 6.2 6.8 - 8.8 g/dL L [facility]   Alkaline Phosphatase 87 40 - 150 U/L  [facility]   ALT 14 0 - 50 U/L  [facility]   AST 19 0 - 45 U/L  [facility]            Routine UA with microscopic [[id number]] (Abnormal)  Resulted: 10/14/18 1029, Result status: Final result    Ordering provider: Kourtney Cano MD  10/14/18 1012 Resulting lab: [facility]    Specimen Information    Type Source Collected On   Catheterized Urine Urine catheter 10/14/18 1009          Components       Value Reference Range Flag Lab   Color Urine Straw   [facility]   Appearance Urine Clear   [facility]   Glucose Urine Negative NEG^Negative mg/dL  [facility]   Bilirubin Urine Negative NEG^Negative  [facility]   Ketones Urine Negative NEG^Negative mg/dL  [facility]   Specific [place] Urine 1.006 1.003 - 1.035  [facility]   Blood Urine Negative NEG^Negative  [facility]   pH Urine 5.0 5.0 - 7.0 pH  [facility]   Protein Albumin Urine Negative NEG^Negative mg/dL  [facility]   Urobilinogen mg/dL 0.0 0.0 - 2.0 mg/dL  [facility]   Nitrite Urine Negative NEG^Negative  [facility]   Leukocyte Esterase Urine Negative NEG^Negative  [facility]   Source Catheterized Urine   [facility]   WBC Urine 1 0 - 5 /HPF  [facility]   RBC Urine 1 0 - 2 /HPF  [facility]   Yeast Urine Few NEG^Negative /HPF A [facility]   Squamous Epithelial /HPF Urine <1 0 - 1 /HPF  [facility]   Mucous Urine Present NEG^Negative /LPF A [facility]   Hyaline Casts 9 0 - 2 /LPF H [facility]            Troponin I [[id number]]  Resulted: 10/14/18 1012, Result status: Final result    Ordering provider: Kourtney Cano MD  10/14/18 0823 Resulting lab: [facility]    Specimen Information    Type Source Collected On     10/14/18 0823          Components       Value Reference Range Flag Lab    Troponin I ES 0.038 0.000 - 0.045 ug/L  [facility]   Comment:         The 99th percentile for upper reference range is 0.045 ug/L.  Troponin values   in the range of 0.045 - 0.120 ug/L may be associated with risks of adverse   clinical events.              Nt probnp inpatient [[id number]] (Abnormal)  Resulted: 10/14/18 1003, Result status: Final result    Ordering provider: Koutrney Cano MD  10/14/18 0851 Resulting lab: [facility]    Specimen Information    Type Source Collected On   Blood  10/14/18 0823          Components       Value Reference Range Flag Lab   N-Terminal Pro BNP Inpatient 53048 0 - 1800 pg/mL H 59   Comment:            Reference range shown and results flagged as abnormal are suggested inpatient   cut points for confirming diagnosis if CHF in an acute setting. Establishing a   baseline value for each individual patient is useful for follow-up. An   inpatient or emergency department NT-proPBNP <300 pg/mL effectively rules out   acute CHF, with 99% negative predictive value.  The outpatient non-acute reference range for ruling out CHF is:   0-125 pg/mL (age 18 to less than 75)   0-450 pg/mL (age 75 yrs and older)              Magnesium [[id number]]  Resulted: 10/14/18 0952, Result status: Final result    Ordering provider: Kourtney Cano MD  10/14/18 0851 Resulting lab: [facility]    Specimen Information    Type Source Collected On   Blood  10/14/18 0823          Components       Value Reference Range Flag Lab   Magnesium 1.8 1.6 - 2.3 mg/dL  [facility]            Blood gas venous [[id number]] (Abnormal)  Resulted: 10/14/18 0941, Result status: Final result    Ordering provider: Kourtney Cano MD  10/14/18 0851 Resulting lab: [facility]    Specimen Information    Type Source Collected On   Blood  10/14/18 0923          Components       Value Reference Range Flag Lab   Ph Venous 7.45 7.32 - 7.43 pH H [facility]  Lab   PCO2 Venous 35 40 - 50 mm Hg L [facility]   PO2 Venous 60 25 - 47 mm Hg H [facility]   Bicarbonate Venous 24 21 - 28 mmol/L  [facility]   Base Excess Venous 0.2 mmol/L  [facility]   Comment:  Reference range:  -7.7 to 1.9   FIO2 21   [facility]            CBC with platelets differential [[id number]] (Abnormal)  Resulted: 10/14/18 0931, Result status: Final result    Ordering provider: Kourtney Cano MD  10/14/18 0851 Resulting lab: [facility]    Specimen Information    Type Source Collected On   Blood  10/14/18 0823          Components       Value Reference Range [facility]   WBC 10.1 4.0 - 11.0 10e9/L  [facility]   RBC Count 3.47 3.8 - 5.2 10e12/L L [facility]   Hemoglobin 9.6 11.7 - 15.7 g/dL L [facility]   Hematocrit 30.8 35.0 - 47.0 % L [facility]   MCV 89 78 - 100 fl  [facility]   MCH 27.7 26.5 - 33.0 pg  [facility]   MCHC 31.2 31.5 - 36.5 g/dL L [facility]   RDW 15.9 10.0 - 15.0 % H [facility]   Platelet Count 368 150 - 450 10e9/L  [facility]   Diff Method Automated Method   [facility]   % Neutrophils 75.5 %  [facility]   % Lymphocytes 16.3 %  [facility]   % Monocytes 5.8 %  [facility]   % Eosinophils 0.6 %  [facility]   % Basophils 0.4 %  [facility]   % Immature Granulocytes 1.4 %  [facility]   Nucleated RBCs 0 0 /100  [facility]   Absolute Neutrophil 7.6 1.6 - 8.3 10e9/L  [facility]   Absolute Lymphocytes 1.6 0.8 - 5.3 10e9/L  [facility]   Absolute Monocytes 0.6 0.0 - 1.3 10e9/L  [facility]   Absolute Basophils 0.0 0.0 - 0.2 10e9/L  [facility]   Abs Immature Granulocytes 0.1 0 - 0.4 10e9/L  [facility]   Absolute Nucleated RBC 0.0   [facility]            Testing Performed By     Lab - Abbreviation Name Director Address Valid Date Range    22 - [facility] Unknown [address] [place] [address] 05/08/15 1057 - Present    59 - Unknown [facility] Unknown [address] [place] [address] 12/31/14 1006 - Present               Imaging Results - 3 Days      XR Chest 2 Views [[id number]]  Resulted:  10/14/18 1108, Result status: Final result    Ordering provider: Kourtney Cano MD  10/14/18 0851 Resulted by: Dereck Muse MD    Performed: 10/14/18 0924 - 10/14/18 1042 Resulting lab: RADIOLOGY RESULTS    Narrative:       XR CHEST 2 VW   10/14/2018 10:42 AM     HISTORY: Shortness of breath;     COMPARISON: Film dated 2/13/2016    FINDINGS: Heart is mildly enlarged. There are bilateral pleural  effusions. There are associated bibasilar opacities compatible with  infiltrate or atelectasis.. Mild pulmonary congestion.  The bones and  soft tissues are unremarkable.      Impression:       IMPRESSION: Cardiomegaly, bilateral pleural effusions and associated  basilar infiltrate or atelectasis. These findings could be due to  congestive heart failure.        MARIAH MUSE MD      Testing Performed By     Lab - Abbreviation Name Director Address Valid Date Range    104 - [facility] RADIOLOGY RESULTS Unknown Unknown 02/16/05 1553 - Present            Encounter-Level Documents:     There are no encounter-level documents.      Order-Level Documents:     There are no order-level documents.

## 2018-10-14 NOTE — ED NOTES
Her vital signs were lost from the chart because the space lab was not hooked up and they did not cross over.  Her sbp was in the 130's to 150's.  And her oximetry was in the mid 90's on room air.

## 2018-10-15 NOTE — PROGRESS NOTES
Discharge Planner   Discharge Plans in progress: return to [facility] (Phone: [phone] Fax: [phone])  Barriers to discharge plan: clinical improvement  Follow up plan: will need handoff to [facility]       Entered by: ELISA HOLM 10/15/2018 10:35 AM

## 2018-10-15 NOTE — CONSULTS
Care Transition Initial Assessment - RN  Reason For Consult: discharge planning   Met with: Patient.    DATA   Principal Problem:    Acute on chronic systolic congestive heart failure (H)  Active Problems:    Hyperlipidemia LDL goal <100    Hypertension goal BP (blood pressure) < 140/90    Decubitus ulcer of coccygeal region    Ulcer of left lower extremity, limited to breakdown of skin (H)    Ulcer of heel, left, with unspecified severity (H)    Systolic heart failure (H)    DNR no code (do not resuscitate)    Personal history of urinary tract infection - pseudomonas    Urine retention    Hx of acute renal failure    History of atrial fibrillation    History of gout       Primary Care Clinic Name: [facility] at [facility]     Contact information and PCP information verified: No    ASSESSMENT  Cognitive Status: awake and alert.       Resources List: Skilled Nursing Facility     Lives With: facility resident  Living Arrangements: extended care facility     Description of Support System: Supportive, Involved   Who is your support system?: Facility resident(s)/Staff   Support Assessment: Adequate family and caregiver support   Insurance Concerns: No Insurance issues identified      This writer met with pt, introduced self and role. Discussed discharge planning and Medicare guidelines in regards to home care and SNF benefits. Patient is currently residing at [facility] (Phone: [phone] Fax: [phone]) in TCU. Her plan is to return there. She is not on a bed hold but will not require one if she returns within 24 hours.     1400: Pt is on a bed hold and will likely return to [facility] 10/16      PLAN    Return to [facility] (Phone: [phone] Fax: [phone])        Discharge Planner   Discharge Plans in progress: Return to [facility]  Barriers to discharge plan: clinical improvement  Follow up plan: per MD recommendation       Entered by: ELISA HOLM 10/15/2018 10:33 AM          Yanet Lopez, MSN, RN, Care Coordinator  [facility] [phone]  [facility] [phone]

## 2018-10-15 NOTE — PROGRESS NOTES
SPIRITUAL HEALTH SERVICES  SPIRITUAL ASSESSMENT Progress Note  [facility]      During Rounding,  introduced himself to Kina Tymarii and informed her of his availability.    Mike Law M.Div., [facility]  Staff   Office tel: [phone]

## 2018-10-15 NOTE — PLAN OF CARE
"Discharge Planner PT   Patient plan for discharge: Return to [facility]  Current status:  Patient is a 89 year old female with acute on chronic decompensated systolic heart failure with shortness of breath and left leg swelling. 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 She suffered a fall prior to her last hospitalization and reports this date she has fractures in her pelvis, hip and leg. Patient informed that her chart denotes the opposite and her right side pain is likely a result of decreased active use of the extremity. Prior to admission patient at [facility] receiving care to progress her mobility, however recently refused to mobilize. Currently patient refusing to participate in evaluation as well as mobilization. \"I will never walk again why are you torturing me, God has forgotten me, He does not want me to get better\". Able to migrate legs towards the EOB, however unable to achieve trunk righting to progress to sit EOB. Patient highly fearful of fallings and refused to sit EOB. Demonstrating LLE weakness and swelling. Refused formal assessment of UE and LE range of motion and strength. Patient MOD assist rolling left and dependent rolling right due to right leg pain. Dependent boost up in bed.   Barriers to return to prior living situation: Unwilling to participate, dependent transfers and positioning.   Recommendations for discharge: TCU vs. LTAC  Rationale for recommendations: Patient unwilling to actively participate, should this change and she be willing to follow commands for bed mobility and limb range of motion she may have potential to make progress towards greater mobility. At this time based on refusal to participate LTAC is appropriate for safe discharge plan.        Entered by: Petty Torres 10/15/2018 4:40 PM       "

## 2018-10-15 NOTE — CONSULTS
Nutrition Note:     RD consulted to provide 2 gm Na diet education. Noted that patient is on a Regular diet. Spoke with RN who reports patient does not need education at this time and that patient resides at SNF who provides meals for her. Please re-consult RD if education is needed or there are changes in nutrition status.     Elly Thurston RDN, LD  Clinical Dietitian  [phone]

## 2018-10-15 NOTE — PLAN OF CARE
Problem: Patient Care Overview  Goal: Plan of Care/Patient Progress Review  Outcome: Improving  Pt.has not voided since catheter was discontinued around 11:30 this morning.She is normally incontinent of urine.Pt.has thick barrier cream applied to buttock area.Dressing applied to her promimal left lower leg area.No drainaged.Open areas to her left lateral and anterior legs are dry and healing.No drainage.Her appetite is fair.Declines any therapy with physical therapist.Lungs are coarse bilaterally.Tele was a SR and Sinus dys.this shift no noted ectopy.Bilateral feet have pitting edema.Sween cream lotion applied to dry cracked skin on left leg.Bilateral posterior heels have pressure areas noted which are not blanchable.

## 2018-10-15 NOTE — PROGRESS NOTES
Temp: 98  F (36.7  C) Temp src: Oral BP: 137/55 Pulse: 75 Heart Rate: 79 Resp: 22 SpO2: 94 % O2 Device: Nasal cannula Oxygen Delivery: 1 LPM Pt c/o back pain when repositioning, given tylenol x1 dose.  Declined additional dose.  Rico draining clear, straw-colored urine, cath cares provided.  Buttocks reddened, excoriated, pressure ulcer present.  Turning and repositioning to be kept off wounds q2h, vitaly cares completed each time for small amount soft brown stool.  Continuing to monitor and assess.

## 2018-10-15 NOTE — PLAN OF CARE
Problem: Patient Care Overview  Goal: Plan of Care/Patient Progress Review  Outcome: Therapy, progress towards functional goals is fair  Vital signs stable on 1L O2 per nc.  Afebrile.  Pt denying pain.  Lung sounds clear/diminished.  Tele NSR.  Bowel sounds normoactive.  Repositioning every 2hrs along with providing vitaly cares, multiple open areas on buttocks and BLE, moderate edema and redness on BLE, heels nonblanchable, kept elevated/ pillows used for repo.  Pt incontinent of urine, requested to use bedpan, c/o stomach discomfort.  Applied barrier cream and clean brief every 2hrs.  IV saline locked, site asymptomatic.

## 2018-10-15 NOTE — ED PROVIDER NOTES
"  History     Chief Complaint   Patient presents with     Shortness of Breath     The history is provided by the patient, the nursing home and medical records.     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  Per patient, she was having chest pain and shortness of breath throughout the night.  She states that she \"tried to push her button but nobody came\".  At some point, staff and her facility examined her and noted her to be tachypneic, hypertensive, anxious, mild tachycardia.  Medics were called and she was brought to the ED.  Patient notes that since coming to the ED she has had no further chest pain.  She denies recent illness, fevers, chills, nausea, vomiting.  She states she is on antibiotics for swelling of her left leg with skin infection.  Review of her records shows that she has been diagnosed with left lower extremity cellulitis.  She states that she is not able to ambulate to the bathroom on her own and usually uses depends.  She denies any bowel complaints.    Problem List:    Patient Active Problem List    Diagnosis Date Noted     Acute on chronic systolic congestive heart failure (H) 10/14/2018     Priority: Medium     Decubitus ulcer of coccygeal region 10/14/2018     Priority: Medium     Ulcer of left lower extremity, limited to breakdown of skin (H) 10/14/2018     Priority: Medium     Ulcer of heel, left, with unspecified severity (H) 10/14/2018     Priority: Medium     Systolic heart failure (H) 10/14/2018     Priority: Medium     DNR no code (do not resuscitate) 10/14/2018     Priority: Medium     Personal history of urinary tract infection - pseudomonas 10/14/2018     Priority: Medium     Urine retention 10/14/2018     Priority: Medium     Hx of acute renal failure 10/14/2018     " Priority: Medium     History of atrial fibrillation 10/14/2018     Priority: Medium     History of gout 10/14/2018     Priority: Medium     Acute on chronic combined systolic and diastolic CHF (congestive heart failure) (H) 01/27/2017     Priority: Medium     BPPV (benign paroxysmal positional vertigo) 02/26/2016     Priority: Medium     Pneumonia of right upper lobe due to infectious organism (H) 02/11/2016     Priority: Medium     Falls frequently 12/09/2014     Priority: Medium     Atrophy of vulva 04/15/2014     Priority: Medium     GI bleed 03/20/2013     Priority: Medium     Anemia 03/20/2013     Priority: Medium     Hypertension goal BP (blood pressure) < 140/90 12/17/2010     Priority: Medium     Hyperlipidemia LDL goal <100 10/31/2010     Priority: Medium     Right knee DJD 04/05/2010     Priority: Medium     Shoulder arthritis 04/05/2010     Priority: Medium     left shoulder limited ROM       DERMATITIS 03/30/2007     Priority: Medium     March 30, 2007 - Refilled topical steroids.        [facility] 08/22/2006     Priority: Medium     8/15/2006 BRIAN [place]: [phone]: Services recommended: Personal Care/HHA, Homemaker, Meals on Wheels, [employer]       Arteritis (H) 07/18/2006     Priority: Medium     7/18/06 [facility]: Fever and rash with a livedo reticularis pattern. Rheumatology consult  Problem list name updated by automated process. Provider to review       Acute myocardial infarction (H) 07/17/2006     Priority: Medium     Admit to [facility]. Angioplasty and stenting of first diagonal branch of LAD. See Epic dictation  August 1, 2006 - Doing well.  LFT's, FLP, FBG, CBCw/PLT.  March 30, 2007 - Wants to stop Plavix and can do so in July.  Problem list name updated by automated process. Provider to review       Abnormal blood chemistry 03/30/2006     Priority: Medium     March 30, 2006 - Fasting today.  Consider metformin if elevated and A1c >7.  August 1, 2006  - Unclear if diabetic criteria met with hospitalization.  Recheck labs in 1 month.  Problem list name updated by automated process. Provider to review       Spastic hemiplegia (H) 05/26/2005     Priority: Medium     Right lower extremity. 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  Problem list name updated by automated process. Provider to review       OSTEOARTHRITIS 04/08/2005     Priority: Medium     Possibly post paralysis pain.  April 8, 2005 - injection rt knee.  5/20/05 Basilio: degenerative arthritis rt knee. First Synvisc injection. Repeat next week.  March 30, 2006 - Possible inflammatory component given characteristic hand deformities.  Rheumatology consult.  August 1, 2006 - Relafen and if not improving can add Ultram.  March 30, 2007 - Ultram and Synvisc didn't work. Discussed joint replacement.  September 26, 2007 - not interested in surgery.  Offered injection at [place] vs trial here.  Opted to try injection here.  Discussed risk/benefit of intraarticular injection including bleeding, infection, steroid flair etc.  Pt requested procedure be done.    1cc Kenalog (40mg/cc)  1cc 1% lidocaine  1cc marcaine    Injected into knee using sterile technique in sitting position. Immediate relief noted.        Advanced directives, Existing AD rec 7/18/2012 notorized 07/18/2012     Priority: Low           Kina Sears has a designated Health Care Agent or Proxy listed in a legal Advance Directive document    Name:Javier Sears  Relationship to patient:son  Phone(s):[phone]  Alternate Name:William Whalen  Relationship to patient: son  Phone(s):[phone]           Health Care Home 12/13/2011     Priority: Low     *See Letters for [facility] Care Plan: My Access Plan              Past Medical History:    Past Medical History:   Diagnosis Date     DNR no code (do not resuscitate) 10/14/2018     GENERAL OSTEOARTHROSIS 4/8/2005     High Blood Pressure 5/26/2005     Right knee DJD 4/5/2010     Shoulder arthritis 4/5/2010      SPASTIC HEMIPLEGIA/PARES UNSP SIDE 5/26/2005       Past Surgical History:    Past Surgical History:   Procedure Laterality Date     ANGIOPLASTY  7/2006    LAD Coronary artery     EYE SURGERY      cataract     SURGICAL HISTORY OF -       Cholecystectomy       Family History:    Family History   Problem Relation Age of Onset     C.A.D. Mother      Hypertension Mother      Arthritis Father      Rheumatoid     Arthritis Sister      Cancer - colorectal Sister      Breast Cancer Sister      C.A.D. Brother      Diabetes Brother      Cerebrovascular Disease Brother      Prostate Cancer No family hx of        Social History:  Marital Status:   [5]  Social History   Substance Use Topics     Smoking status: Never Smoker     Smokeless tobacco: Never Used     Alcohol use No        Medications:      No current facility-administered medications on file prior to encounter.   Current Outpatient Prescriptions on File Prior to Encounter:  Acetaminophen (TYLENOL PO) Take 500 mg by mouth 4 times daily   Calcium Carb-Cholecalciferol 500-200 MG-UNIT TABS Take 1 tablet by mouth daily   ferrous sulfate (IRON) 325 (65 Fe) MG tablet Take 325 mg by mouth daily (with breakfast)   Furosemide (LASIX PO) Take 20 mg by mouth daily   lisinopril (PRINIVIL/ZESTRIL) 20 MG tablet TAKE ONE TABLET BY MOUTH EVERY DAY   metoprolol succinate (TOPROL-XL) 25 MG 24 hr tablet TAKE ONE TABLET BY MOUTH EVERY DAY   omeprazole (PRILOSEC) 20 MG capsule Take 1 capsule (20 mg) by mouth daily   fluticasone (FLONASE) 50 MCG/ACT nasal spray Spray 2 sprays into both nostrils daily As needed for nasal congestion.   nitroglycerin (NITROSTAT) 0.4 MG SL tablet Place 1 tablet (0.4 mg) under the tongue See Admin Instructions EVERY 5 MIN AS NEEDED, UP TO 3 PER EPISODE   nystatin (MYCOSTATIN) 978558 UNIT/GM POWD Apply topically daily as needed   order for DME Equipment being ordered: 4 wheeled walker         Review of Systems   All other ROS reviewed and are negative or  "non-contributory except as stated in HPI.     Physical Exam   BP: 153/69  Pulse: 90  Heart Rate: 93  Temp: 97.1  F (36.2  C)  Resp: 20  Height: 149.9 cm (4' 11\")  Weight: 66.7 kg (147 lb)  SpO2: 92 %      Physical Exam   Constitutional: She appears well-developed and well-nourished.   Very pleasant, conversant, elderly female lying in the bed.  O2 per nasal cannula.   HENT:   Head: Normocephalic.   Nose: Nose normal.   Mouth/Throat: Oropharynx is clear and moist.   Eyes: Conjunctivae and EOM are normal. Pupils are equal, round, and reactive to light.   Neck: Normal range of motion. Neck supple.   Cardiovascular: Normal rate, regular rhythm, normal heart sounds and intact distal pulses.    Pulmonary/Chest:   Patient appears winded with talking.  Mild tachypnea.  Crackles heard at the bases.   Abdominal: Soft. There is no tenderness.   Musculoskeletal:   Right lower extremity with edema and mild erythema to the knee.  She has a small superficial ulceration mid lower leg anteriorly and scattered superficial ulcerations posteriorly at the top of the calf   Neurological: She is alert. No cranial nerve deficit. Coordination normal.   Skin:   Scattered decubitus ulcers in the buttocks   Psychiatric: She has a normal mood and affect. Her behavior is normal.   Vitals reviewed.      ED Course (with Medical Decision Making)    Pt seen and examined by me.  RN and EPIC notes reviewed.      Patient with onset of increasing shortness of breath, chest pain overnight.  On exam, she is tachypneic, O2 sats are 92% on room air, she has crackles and lower extremity edema, especially on the left with mild erythema and some superficial ulcerations.    I am concerned for CHF exacerbation although she could have other underlying pulmonary cause including pneumonia or PE with the swelling of the leg.    IV placed for labs, plan chest x-ray, EKG.  She is on Lasix orally and I am planning on giving her some IV.  [facility] I have elected to place a Rico catheter so we can more closely monitor I/O.    EKG shows a left bundle branch block which is consistent with a verbal read from an outside hospital 1/17/17.  Labs show mild anemia with hemoglobin 9.6.  She is on iron so I suspect she has iron deficiency anemia.  Her chemistry panel is unremarkable.  VBG shows results as below.  Urine analysis is unremarkable.  She has a very high BNP at 30,000.    At before Lasix, patient had 800 cc out Via Rico catheter.  This is unexpected, so we did not do a bladder scan prior to placing the Rico.  After 20 mg of Lasix, she had 500 cc out so a second dose was given.    Chest x-ray showed cardiomegaly, pleural effusions.    I discussed the patient with the hospitalist and I am going to have her admitted observation status for further.  She may need an echo.  Initial troponin negative.  She feels improved in the ED.  I discussed the results with the patient and her daughter and they are comfortable with the plan.     Procedures       EKG Interpretation:      Interpreted by Kourtney Cano  Time reviewed: 0916   Symptoms at time of EKG: SOA   Rhythm: normal sinus   Rate: 85  Axis: Normal  Ectopy: none  Conduction: left bundle branch block (complete)  ST Segments/ T Waves: No acute ischemic changes  Q Waves: none  Comparison to prior: Unchanged    Clinical Impression: left bundle branch block                Results for orders placed or performed during the hospital encounter of 10/14/18 (from the past 24 hour(s))   CBC with platelets differential   Result Value Ref Range    WBC 10.1 4.0 - 11.0 10e9/L    RBC Count 3.47 (L) 3.8 - 5.2 10e12/L    Hemoglobin 9.6 (L) 11.7 - 15.7 g/dL    Hematocrit 30.8 (L) 35.0 - 47.0 %    MCV 89 78 - 100 fl    MCH 27.7 26.5 - 33.0 pg    MCHC 31.2 (L) 31.5 - 36.5 g/dL    RDW 15.9 (H) 10.0 - 15.0 %    Platelet Count 368 150 - 450 10e9/L    Diff Method Automated Method     % Neutrophils  75.5 %    % Lymphocytes 16.3 %    % Monocytes 5.8 %    % Eosinophils 0.6 %    % Basophils 0.4 %    % Immature Granulocytes 1.4 %    Nucleated RBCs 0 0 /100    Absolute Neutrophil 7.6 1.6 - 8.3 10e9/L    Absolute Lymphocytes 1.6 0.8 - 5.3 10e9/L    Absolute Monocytes 0.6 0.0 - 1.3 10e9/L    Absolute Basophils 0.0 0.0 - 0.2 10e9/L    Abs Immature Granulocytes 0.1 0 - 0.4 10e9/L    Absolute Nucleated RBC 0.0    Magnesium   Result Value Ref Range    Magnesium 1.8 1.6 - 2.3 mg/dL   Nt probnp inpatient   Result Value Ref Range    N-Terminal Pro BNP Inpatient 66039 (H) 0 - 1800 pg/mL   Troponin I   Result Value Ref Range    Troponin I ES 0.038 0.000 - 0.045 ug/L   Comprehensive metabolic panel   Result Value Ref Range    Sodium 140 133 - 144 mmol/L    Potassium 3.6 3.4 - 5.3 mmol/L    Chloride 108 94 - 109 mmol/L    Carbon Dioxide 21 20 - 32 mmol/L    Anion Gap 11 3 - 14 mmol/L    Glucose 123 (H) 70 - 99 mg/dL    Urea Nitrogen 12 7 - 30 mg/dL    Creatinine 0.62 0.52 - 1.04 mg/dL    GFR Estimate >90 >60 mL/min/1.7m2    GFR Estimate If Black >90 >60 mL/min/1.7m2    Calcium 8.1 (L) 8.5 - 10.1 mg/dL    Bilirubin Total 0.2 0.2 - 1.3 mg/dL    Albumin 2.2 (L) 3.4 - 5.0 g/dL    Protein Total 6.2 (L) 6.8 - 8.8 g/dL    Alkaline Phosphatase 87 40 - 150 U/L    ALT 14 0 - 50 U/L    AST 19 0 - 45 U/L   Blood gas venous   Result Value Ref Range    Ph Venous 7.45 (H) 7.32 - 7.43 pH    PCO2 Venous 35 (L) 40 - 50 mm Hg    PO2 Venous 60 (H) 25 - 47 mm Hg    Bicarbonate Venous 24 21 - 28 mmol/L    Base Excess Venous 0.2 mmol/L    FIO2 21    Routine UA with microscopic   Result Value Ref Range    Color Urine Straw     Appearance Urine Clear     Glucose Urine Negative NEG^Negative mg/dL    Bilirubin Urine Negative NEG^Negative    Ketones Urine Negative NEG^Negative mg/dL    Specific Gravity Urine 1.006 1.003 - 1.035    Blood Urine Negative NEG^Negative    pH Urine 5.0 5.0 - 7.0 pH    Protein Albumin Urine Negative NEG^Negative mg/dL     Urobilinogen mg/dL 0.0 0.0 - 2.0 mg/dL    Nitrite Urine Negative NEG^Negative    Leukocyte Esterase Urine Negative NEG^Negative    Source Catheterized Urine     WBC Urine 1 0 - 5 /HPF    RBC Urine 1 0 - 2 /HPF    Yeast Urine Few (A) NEG^Negative /HPF    Squamous Epithelial /HPF Urine <1 0 - 1 /HPF    Mucous Urine Present (A) NEG^Negative /LPF    Hyaline Casts 9 (H) 0 - 2 /LPF   XR Chest 2 Views    Narrative    XR CHEST 2 VW   10/14/2018 10:42 AM     HISTORY: Shortness of breath;     COMPARISON: Film dated 2/13/2016    FINDINGS: Heart is mildly enlarged. There are bilateral pleural  effusions. There are associated bibasilar opacities compatible with  infiltrate or atelectasis.. Mild pulmonary congestion.  The bones and  soft tissues are unremarkable.      Impression    IMPRESSION: Cardiomegaly, bilateral pleural effusions and associated  basilar infiltrate or atelectasis. These findings could be due to  congestive heart failure.        MARIAH QUEZADA MD       Medications   sodium chloride (PF) 0.9% PF flush 3 mL (3 mLs Intracatheter Given 10/14/18 1023)   naloxone (NARCAN) injection 0.1-0.4 mg (not administered)   sodium chloride (PF) 0.9% PF flush 3 mL (not administered)   acetaminophen (TYLENOL) tablet 650 mg (650 mg Oral Given 10/14/18 1710)   lisinopril (PRINIVIL/ZESTRIL) tablet 20 mg (not administered)   furosemide (LASIX) tablet 20 mg (not administered)   metoprolol succinate (TOPROL-XL) 24 hr tablet 25 mg (not administered)   nitroGLYcerin (NITROSTAT) sublingual tablet 0.4 mg (not administered)   miconazole (MICATIN; MICRO GUARD) 2 % powder (not administered)   omeprazole (priLOSEC) CR capsule 20 mg (not administered)   lidocaine 1 % 1 mL (not administered)   lidocaine (LMX4) kit (not administered)   sodium chloride (PF) 0.9% PF flush 3 mL (3 mLs Intracatheter Given 10/14/18 1920)   sodium chloride (PF) 0.9% PF flush 3 mL (3 mLs Intracatheter Given 10/14/18 2153)   furosemide (LASIX) injection 20 mg (20 mg  Intravenous Given 10/14/18 1023)   furosemide (LASIX) injection 20 mg (20 mg Intravenous Given 10/14/18 1135)   furosemide (LASIX) injection 20 mg (20 mg Intravenous Given 10/14/18 1919)       Assessments & Plan     I have reviewed the findings, diagnosis, plan with the patient.    Current Discharge Medication List          Final diagnoses:   Acute congestive heart failure, unspecified heart failure type (H)     Disposition:  Patient admitted in stable condition.    Note: Chart documentation done in part with Dragon Voice Recognition software. Although reviewed after completion, some word and grammatical errors may remain.     10/14/2018   [address] MEDICAL SURGICAL     Kourtney Cano MD  10/14/18 3935

## 2018-10-15 NOTE — PROGRESS NOTES
" 10/15/18 1430   Quick Adds   Type of Visit Initial PT Evaluation       Present no   Living Environment   Lives With facility resident   Living Arrangements extended care facility   Home Accessibility no concerns   Transportation Available van, wheelchair accessible   Self-Care   Dominant Hand left   Usual Activity Tolerance poor   Current Activity Tolerance poor   Regular Exercise no   Equipment Currently Used at Home walker, rolling  (four wheeled walker)   Functional Level Prior   Ambulation 3-->assistive equipment and person   Transferring 3-->assistive equipment and person   Toileting 3-->assistive equipment and person   Bathing 3-->assistive equipment and person   Dressing 3-->assistive equipment and person   Eating 0-->independent   Communication 0-->understands/communicates without difficulty   Swallowing 0-->swallows foods/liquids without difficulty   Cognition 1 - attention or memory deficits   Fall history within last six months yes   Number of times patient has fallen within last six months 1   Which of the above functional risks had a recent onset or change? ambulation;transferring   General Information   Onset of Illness/Injury or Date of Surgery - Date 10/14/18   Referring Physician Dr. Martinez   Patient/Family Goals Statement Patient unable to indicate \"I don't know\"   Precautions/Limitations fall precautions   Weight-Bearing Status - LUE full weight-bearing   Weight-Bearing Status - RUE full weight-bearing   Weight-Bearing Status - LLE full weight-bearing   Weight-Bearing Status - RLE full weight-bearing   General Observations Patient left sidelying, highly resistance to mobilization and fearful of falling. Requesting PT get more assistance and fixated that PT will make her walk, while PT only requested to see bed mobility.    General Info Comments PT orders: eval and treat patient unwilling to ambulate. Activity orders: ambulate with asssitance, up with assistance   Cognitive " Status Examination   Orientation person;time   Level of Consciousness alert;confused   Follows Commands and Answers Questions able to follow single-step instructions;25% of the time   Personal Safety and Judgment impaired   Memory impaired   Pain Assessment   Patient Currently in Pain Yes, see Vital Sign flowsheet  (right leg pain)   Integumentary/Edema   Integumentary/Edema Comments Bilat pressure relieving boots. Wounds across posterior surface of hips   Posture    Posture Kyphosis;Forward head position;Protracted shoulders   Range of Motion (ROM)   ROM Comment Patient unwilling to demonstrate with the left. Right UE flexion to 110 and full elbow ROM. Right knee 10-75. right hip unable to extend and resistant to flexio greater than 75 degrees   Strength   Strength Comments Unable to assess as patient unwilling to follow commands for testing out of fear   Bed Mobility   Bed Mobility Bed mobility skill: Rolling/Turning;Bed mobility skill: Scooting/Bridging   Bed Mobility Skill: Rolling/Turning   Level of Dow: Rolling/Turning maximum assist (25% patients effort)   Physical/Nonphysical Assist: Rolling/Turning 1 person assist   Assistive Device: Rolling/Turning bed rails   Bed Mobility Skill: Scooting/Bridging   Level of Dow: Scooting/Bridging maximum assist (25% patients effort)   Physical/Nonphysical Assist: Scooting/Bridging 1 person assist   Assistive Device: Scooting/Bridging bed rails   Transfer Skills   Transfer Comments Patient refused due to fear of falling and right leg pain   Gait   Gait Comments Unable   Balance   Balance Comments Unable to assess, attempted to sit up in bed however patient progressive resisted with trunk extension   Sensory Examination   Sensory Perception Comments Unable to asses   Coordination   Coordination Comments Unable to assess   Muscle Tone   Muscle Tone Comments Low tone throughout   General Therapy Interventions   Planned Therapy Interventions bed mobility  "training;ROM;strengthening   Clinical Impression   Criteria for Skilled Therapeutic Intervention yes, treatment indicated   PT Diagnosis joint stiffness, muscle weakness, fear, impaired balance   Influenced by the following impairments Lack of physical activity due to hospital stay and sores   Functional limitations due to impairments impaired functional mobility, decreased activity tolerance, high risk for complications due to lack of mobility   Clinical Presentation Evolving/Changing   Clinical Presentation Rationale Patient demonstrates change in mobility from baseline and high fear of falling unwiilling to follow commands for mobility sequencing. Patient's fear and pain currently limits her mobilizatino, thus with management and education improve some degree of improvement.    Clinical Decision Making (Complexity) Moderate complexity   Therapy Frequency` 5 times/week   Predicted Duration of Therapy Intervention (days/wks) 1-2 days    Anticipated Equipment Needs at Discharge (Defer to placement)   Anticipated Discharge Disposition Transitional Care Facility  (vs. Long term care facility)   Risk & Benefits of therapy have been explained Yes   Patient, Family & other staff in agreement with plan of care Yes   Clinical Impression Comments Patient unwilling to actively participate, should this change and she be willing to follow commands for bed mobility and limb range of motion she may have potential to make progress towards greater mobility. At this time based on refusal to participate long term care facility is appropriate for safe discharge plan.   [facility] [employer] \"6 Clicks\"   2016, Trustees of [facility], under license to "[employer].  All rights reserved.   6 Clicks Short Forms Basic Mobility Inpatient Short Form   [facility] [employer]  \"6 Clicks\" V.2 Basic Mobility Inpatient Short Form   1. Turning from your back to your side while in a flat bed without using bedrails? 2 - A Lot   2. " Moving from lying on your back to sitting on the side of a flat bed without using bedrails? 1 - Total   3. Moving to and from a bed to a chair (including a wheelchair)? 1 - Total   4. Standing up from a chair using your arms (e.g., wheelchair, or bedside chair)? 1 - Total   5. To walk in hospital room? 1 - Total   6. Climbing 3-5 steps with a railing? 1 - Total   Basic Mobility Raw Score (Score out of 24.Lower scores equate to lower levels of function) 7   Total Evaluation Time   Total Evaluation Time (Minutes) 24     Thank you for your referral.    Petty Torres, PT, DPT, ATC    [facility]    O: [phone]  E: [email or web address]

## 2018-10-15 NOTE — PROGRESS NOTES
[facility] Progress Note          Assessment and Plan:   Assessment:   Principal Problem:    Acute on chronic systolic congestive heart failure (H)    Assessment: Patient with a known ejection fraction of 25% from an echocardiogram performed in 2017.  Family is not desiring repeat echocardiogram at this time with consideration of transition to comfort care if her time at the TCU following this discharge does not go well.  Patient has responded well to the IV Lasix given over 19 she is weaned to room air with resolution of her shortness of breath and improvement of her lower extremity edema    Plan:   will transition to oral Lasix 20 mg daily.  Will proceed with voiding trial  and anticipate discharge back to the TCU later today versus tomorrow once it is known if she needs ongoing Rico placement at time of discharge.  If patient is still hospitalized tomorrow, with repeat basic metabolic panel to further evaluate renal function and potassium.    Active Problems:    Urine retention    Assessment: Thought likely secondary to oxybutynin which was started approximately 2 months ago with issues of retention at her recent stay at [facility] complicated by an UTI for which she completed Keflex and cipro.  Presented with recurrent retention and 800 cc in her bladder    Plan: Did discuss with patient and her son the pros and cons of ongoing Rico administration, especially as she is just getting over a bladder infection.  They would like to try to remove the Rico and perform trial of voiding.  They are aware that if she is unable to successfully void or has significant retention development will need to replace the Rico and have outpatient urology follow-up.        Hyperlipidemia LDL goal <100    Assessment: Chronic, not receiving treatment    Plan: Outpatient follow-up is recommended      Hypertension goal BP (blood pressure) < 140/90    Assessment:  On lisinopril and metoprolol at baseline, held  secondary to IV Lasix administration.  Blood pressures are slightly elevated this morning    Plan: As restart metoprolol and lisinopril as well as ongoing Lasix diuresis as above and monitor for blood pressure stability.      Decubitus ulcer of coccygeal region    Assessment: Present at time of hospitalization    Plan: We will attempt to have the wound care nurse see this later today for recommendations      Ulcer of left lower extremity, limited to breakdown of skin (H)    Assessment: Present at time of hospitalization    Plan: Proceed with wound care referral      Ulcer of heel, left, with unspecified severity (H)    Assessment: Present at time of hospitalization    Plan: Proceed with wound care referral      Systolic heart failure (H)    Assessment: With acute worsening as above    Plan: Proceed with plan as above      DNR no code (do not resuscitate)    Assessment: Noted    Plan: Will honor      Personal history of urinary tract infection - pseudomonas    Assessment: Recently completed Keflex and ciprofloxacin for antibiotic course completion.  Repeat UA showed no concern for ongoing infection    Plan: Continue to monitor      Hx of acute renal failure    Assessment:  No growth on culture was present during previous hospitalization but no evidence of failure during this stay    Plan: Continue to monitor for stability, especially with ongoing diuresis efforts      History of atrial fibrillation    Assessment: Currently normal sinus rhythm    Plan: Continue with metoprolol and monitor      History of gout    Assessment: No medication recently without evidence of acute flare    Plan: Continue to monitor       # Pain Assessment:  Current Pain Score 10/15/2018   Patient currently in pain? denies   Pain score (0-10) -   Pain location -   Pain descriptors -   Kina cuellar pain level was assessed and she currently denies pain.        Disposition Plan   Discharge Planning and Disposition    1.  Clinically stable enough to  "begin D/C disposition and planning: Yes      2.  Patient Goals for Discharge:      A.  Clinical - The following need to be achieved for discharge:  stable functional status, stable oxygen requirement and stable vital signs, determination of ongoing need for ramírez placement    B.  Diagnostic testing and/or procedures completed and all applicable results available before discharge - cultures, endoscopy, imaging, cardiac testing:  Not Met    C.  Anticipated post discharge treatment plan formulated and the following needs have been identified: rehab (PT, OT, ST)    3. Potential Barriers to Discharge:  PT evaluation, voiding trial    4. Recommended Disposition:  Transitional Care Unit - return to         Entered: Wendy Martinez 10/15/2018, 11:33 AM       VTE:  Observation status  Code Status:  DNR/DNI        Interval History:   Starting to improve with patient voiding well following Lasix overnight and weight decreased just under 5 lbs.  No need for O2 supplementation this morning and patient's breathing has returned to baseline.  Vital signs stable and tolerated diuresis well.  Eating and voiding well.  Tolerating medications without significant side effects.  Patient is worried about her ability to participate in rehab.           Significant Problems:     Past Medical History:   Diagnosis Date     DNR no code (do not resuscitate) 10/14/2018     GENERAL OSTEOARTHROSIS 4/8/2005 April 8, 2005 - injection rt knee.     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            Physical Exam:   Blood pressure 147/51, pulse 83, temperature 99.2  F (37.3  C), temperature source Axillary, resp. rate 20, height 1.499 m (4' 11\"), weight 56.7 kg (125 lb), SpO2 92 %, not currently breastfeeding.  Constitutional:   awake, alert, " cooperative, no apparent distress, and appears stated age     Lungs:   No increased work of breathing, good air exchange, clear to auscultation bilaterally, no crackles or wheezing     Cardiovascular:   Normal apical impulse, regular rate and rhythm     Abdomen:   Bowel sounds present, abdomen soft and non-tender     Musculoskeletal:   1-2+ pitting edema noted bilaterally, improved from previous     Neurologic:   Awake, alert, oriented to name, place and time.  Does seem slightly confused about the timing of events (how many days she has been at various places) and very nervous about the future but is aware of much of her recent medical history and what the plan has been     Skin:   Ongoing skin ulcerations noted on her left leg and buttocks, which were present at time of admission             Data:   All laboratory data reviewed    Attestation:  I have reviewed today's vital signs, notes, medications, labs and imaging.     Electronically Signed:  Wendy Martinez MD    Note: Chart documentation done in part with Dragon Voice Recognition software. Although reviewed after completion, some word and grammatical errors may remain.

## 2018-10-16 NOTE — PLAN OF CARE
Discharge Planner PT   Patient plan for discharge: TCU  Current status: Remains fearful however able to follow commands well this date. Completed UE and LE active range of motion tasks with minimal cues and fair tolerance, noted decreased pain with activities. Supine to sitting EOB with Max assist of 1 and HOB elevated. Sitting EOB without feet on ground with single UE support for 5 minutes, completed seated reaching task with good trunk control and no increased pain. Max assist sit to supine transfer. MOD IND rolling right and to neutral. Dependent for boosting up in bed.   Barriers to return to prior living situation: Impaired functional mobility and decreased activity tolerance  Recommendations for discharge: TCU, van transport  Rationale for recommendations: Patient demonstrates improved task following, motivation and willingness to participate thus there is potential for patient to achieve higher level of function with skilled therapeutic intervention.        Entered by: Petty Torres 10/16/2018 11:12 AM     Physical Therapy Discharge Summary    Reason for therapy discharge:    Discharged to transitional care facility.    Progress towards therapy goal(s). See goals on Care Plan in [facility] electronic health record for goal details.  Goals partially met.  Barriers to achieving goals:   limited tolerance for therapy and discharge from facility.    Therapy recommendation(s):    Continued therapy is recommended.  Rationale/Recommendations:  see above.

## 2018-10-16 NOTE — DISCHARGE SUMMARY
[facility] Discharge Summary    Kina Sears MRN# [id number]   Age: 89 year old YOB: 1923     Date of Admission:  10/14/2018  Date of Discharge::  10/16/2018  Admitting Physician:  Ronald Nazario MD  Discharge Physician:  Wendy Martinez MD    Home clinic: None - patient will be following up with the [facility] provider           Admission Diagnoses:   Acute congestive heart failure, unspecified heart failure type (H) [I50.9]          Discharge Diagnosis:    Principal Problem:    Acute on chronic systolic congestive heart failure (H)    Assessment: Patient with a known ejection fraction of 25% from an echocardiogram performed in 2017.  Family is not desiring repeat echocardiogram at this time with consideration of transition to comfort care if her time at the TCU following this discharge does not go well.  Patient has responded well to the IV Lasix given with resolution of her shortness of breath and improvement of her lower extremity edema and weight remained stable with transition to PO Lasix.    Plan:    Patient will discharge back to the TCU with ongoing Lasix 20 units in the morning and 10 units at night with daily weight monitoring and repeat basic metabolic panel in 1 week to ensure tolerance of increased Lasix dosing.     Active Problems:    Urine retention    Assessment: Thought likely secondary to oxybutynin which was started approximately 2 months ago with issues of retention at her recent stay at [facility] complicated by an UTI for which she completed Keflex and cipro.  Presented with recurrent retention and 800 cc in her bladder and patient failed a voiding trial during this observation stay with Rico replacement required    Plan: continue to hold oxybutynin now going forward as this may be the possible source of retention.  Patient will follow up with urology for further evaluation and management.       Hyperlipidemia LDL goal <100    Assessment:  Chronic, not receiving treatment    Plan: Outpatient follow-up is recommended       Hypertension goal BP (blood pressure) < 140/90    Assessment:   Overall stable pain is been well controlled with    Plan: Discharge without change to metoprolol and lisinopril, increase Lasix as above       Decubitus ulcer of coccygeal region; Ulcer of left lower extremity, limited to breakdown of skin (H); Ulcer of heel, left, with unspecified severity (H)    Assessment: Present at time of hospitalization, general surgery has recommended initial wound care treatment    Plan: We will discharge back to TCU with ongoing wound care and close follow-up.  As needed Tylenol and Ultram       Systolic heart failure (H)    Assessment: With acute worsening as above    Plan: Discharge with plan as above       Personal history of urinary tract infection - pseudomonas    Assessment: Recently completed Keflex and ciprofloxacin for antibiotic course completion.  Repeat UA showed no concern for ongoing infection    Plan: no further treatment is needed       Hx of acute renal failure    Assessment:  was present during previous hospitalization but no evidence of failure during this stay    Plan: ongoing outpatient monitoring going forward with increased Lasix dosing at discharge        History of atrial fibrillation    Assessment: Currently normal sinus rhythm    Plan: Continue with metoprolol at discharge        History of gout    Assessment: No medication recently without evidence of acute flare    Plan: discharge without change      # Discharge Pain Plan:   - During her hospitalization, Kina experienced pain due to ulceration pain and chronic leg pain.  The pain plan for discharge was discussed with Kina and her son and the plan was created in a collaborative fashion.    - Prescribed on discharge: Ultram prn  - Bowel regimen: senna and docusate prn  - Pharmacologic adjuvants:  Acetaminophen  - Follow-up: with nursing home provider within one week             Procedures:   No procedures performed during this admission          Medications Prior to Admission:     No prescriptions prior to admission.             Discharge Medications:     Discharge Medication List as of 10/16/2018  3:23 PM      START taking these medications    Details   traMADol (ULTRAM) 50 MG tablet Take 0.5 tablets (25 mg) by mouth every 6 hours as needed for moderate pain, Disp-12 tablet, R-0, Local Print         CONTINUE these medications which have CHANGED    Details   acetaminophen (TYLENOL) 325 MG tablet Take 2 tablets (650 mg) by mouth every 4 hours as needed for mild pain, Disp-100 tablet, Transitional      furosemide (LASIX) 20 MG tablet Take 1 tablet (20 mg) by mouth daily At 0800 and 1/2 tablet (10 mg) by mouth by mouth at 1400, Disp-30 tablet, R-0, Transitional         CONTINUE these medications which have NOT CHANGED    Details   Calcium Carb-Cholecalciferol 500-200 MG-UNIT TABS Take 1 tablet by mouth daily, Historical      ferrous sulfate (IRON) 325 (65 Fe) MG tablet Take 325 mg by mouth daily (with breakfast), Historical      fluticasone (FLONASE) 50 MCG/ACT nasal spray Spray 2 sprays into both nostrils daily As needed for nasal congestion., Disp-16 g, R-3, E-Prescribe      lisinopril (PRINIVIL/ZESTRIL) 20 MG tablet TAKE ONE TABLET BY MOUTH EVERY DAY, Disp-90 tablet, R-0, E-Prescribe      metoprolol succinate (TOPROL-XL) 25 MG 24 hr tablet TAKE ONE TABLET BY MOUTH EVERY DAY, Disp-90 tablet, R-1, E-Prescribe      nitroglycerin (NITROSTAT) 0.4 MG SL tablet Place 1 tablet (0.4 mg) under the tongue See Admin Instructions EVERY 5 MIN AS NEEDED, UP TO 3 PER EPISODE, Disp-90 tablet, R-0, E-Prescribe      omeprazole (PRILOSEC) 20 MG capsule Take 1 capsule (20 mg) by mouth daily, Disp-90 capsule, R-1, E-Prescribe      order for DME Equipment being ordered: 4 wheeled walkerDisp-1 Device, R-0, Local Print         STOP taking these medications       nystatin (MYCOSTATIN) [id number] UNIT/GM POWD  Comments:   Reason for Stopping:                     Consultations:   No consultations were requested during this admission          Brief History of Illness:   Patient is a 89-year-old female who presented with shortness of breath and worsening leg edema with known CHF history and a recent hospitalization for cellulitis, sepsis, and urinary tract infection.  In the emergency room she was found to be volume overloaded with mild hypoxia and decision was made to observe patient for diuresis efforts          Hospital Course:   Patient responded very well to IV Lasix in less than 12 hours and weaned to room air with resolution of her shortness of breath and significant improvement of her swelling.  She was found to have many superficial ulcerations of her buttocks and left leg at time of admission in general surgery did see and recommend treatment during this observation stay.  Patient also struggled with pain related to the ulcerations as well as ongoing chronic pain but the symptoms were well controlled with as needed Tylenol and Ultram.  Patient did have urinary retention that was present at time of admission, thought secondary to patient recent history of oxybutynin use.  Retention persisted despite a trial of voiding and she will be discharged with Rico catheter in place and urology follow-up scheduled         Physical Exam:   Vitals were reviewed  Constitutional:   awake, alert, cooperative, no apparent distress, and appears stated age     Lungs:   No increased work of breathing, good air exchange, clear to auscultation bilaterally, no crackles or wheezing     Cardiovascular:   Normal apical impulse, regular rate and rhythm     Abdomen:   Bowel sounds present, abdomen soft and non-tender     Musculoskeletal:   Ongoing edema in bilateral lower legs but improved from time of admission     Neurologic:   Awake, alert, oriented to name, place and time.       Skin:   Ongoing skin ulcerations of the buttocks and left  leg, unchanged from time of admission               Discharge Instructions and Follow-Up:   Discharge diet: Regular   Discharge activity: Activity as tolerated with nursing assistance    Discharge follow-up: Follow up with nursing home provider within one week           Discharge Disposition:   Discharged to [facility]      Attestation:  I have reviewed today's vital signs, notes, medications, labs and imaging.    More than 30 minutes was spent on this discharge.      Wendy Martinez MD    Note: Chart documentation done in part with Dragon Voice Recognition software. Although reviewed after completion, some word and grammatical errors may remain.

## 2018-10-16 NOTE — PROGRESS NOTES
S-(situation): Patient discharged to [facility] via [facility].     B-(background): CHF and pain/swelling in left leg.     A-(assessment):  Patient's lasix was increased and pain decreased with use of Ultram.      R-(recommendations): Discharge instructions reviewed with patient. Listed belongings gathered and returned to patient.          Discharge Nursing Criteria:     Care Plan and Patient education resolved: Yes    New Medications- pt has been educated about purpose and side effects: Yes    Vaccines  Influenza status verified at discharge:  Yes      Intentionally Retained Items (examples: Drains, Wound packing, ureteral stents, ramírez, PICC line or IV)  Patient was sent home with Ramírez left in place.       MISC  Prescriptions if needed, hard copies sent with patient  NA  Home and hospital aquired medications returned to patient: NA  Medication Bin checked and emptied on discharge Yes  Patient reports post-discharge pain management plan is effective: Yes

## 2018-10-16 NOTE — PROGRESS NOTES
"SPIRITUAL HEALTH SERVICES  SPIRITUAL ASSESSMENT Progress Note  [facility]      PRIMARY FOCUS:     Emotional/spiritual/Anabaptism distress    Support for coping - Pt request    ILLNESS CIRCUMSTANCES:     Reviewed documentation.     Context of Serious Illness/Symptoms - Congestive Heart Failure    Resources for Support - Family, orly    DISTRESS:     Emotional/Spiritual/Existential Distress - Pt voiced sadness at having lost a son to cancer and shared that she was one of two living children out of sixteen children.  She also stated that her  had  23 yrs ago.   validated her grieving.    Tenriism Distress - Pt expressed frustration that the Lord wasn't answering her prayers.  Pt normalized her frustration, but also mentioned the Lord was trying to help her through the medical team.    Social/Cultural/Economic Distress - Unknown    SPIRITUAL/Hindu COPING:     Worship/Orly - Orthodox.    Spiritual Practice - Prayer.   provided a prayer.    GOALS OF CARE:    Goals of Care - Address CHF    Meaning/Sense-Making - Pt's orly is central to her meaning-making.  She commented that \"she prayed every night.\"    PLAN:  is available for pt/family needs.    Mike Law M.Div., [facility]  Staff   Office tel: [phone]    "

## 2018-10-16 NOTE — PROGRESS NOTES
Temp: 98.6  F (37  C) Temp src: Oral BP: 126/49 Pulse: 91 Heart Rate: 91 Resp: 21 SpO2: 93 % O2 Device: None (Room air) Pt c/o bilateral leg pain, stating it is constant and aching.  Also c/o buttocks pain.  Turning and repositioning patient q2h, Tylenol prn for pain. Writer notified Dr. Garza tylenol not providing adequate pain control, orders acknowledged for ultram.  Pt voided 150ml incontinent urine (measured by pad weight) since PO dose lasix given this afternoon, bladder scan for 600ml+  Charge RN and Dr. Garza notified.  Continuing to monitor and assess.

## 2018-10-16 NOTE — PROGRESS NOTES
"S-(situation): End of Shift Note    B-(background): CHF, urinary retention    A-(assessment): Alert and oriented x4. VSS. Afebrile. Tolerating RA with SATS mid to upper 90's. LS diminished . Patient reported pain \"all over body\". Ultram and Tylenol were given but were not effective in managing pain to tolerable levels-Writer updated charge nurse, MD stated to continue with Tylenol and F/u as needed.  Patient takes larger pills crushed in apples sauce. Patient reposition q2-3hrs and per request. Wounds on buttocks open to air with barrier cream.  No stool. Patient having adequate amounts of clear yellow urine out of ramírez catheter.    R-(recommendations): Monitor VS, I&O, Labs, weight  "

## 2018-10-16 NOTE — PROGRESS NOTES
Kina Orion  Gender: female  : 1923  [address] [place] [address]  [phone] (home)     Medical Record: [id number]  Pharmacy:    [facility] - [place] - [address] [facility] - [place], [place] - [address] [place] [address]  Primary Care Provider: No Ref-Primary, Physician    Parent's names are: Data Unavailable (mother) and Data Unavailable (father).      [facility]  2018     Discharge Phone Call:  Discharge to [facility]

## 2018-10-16 NOTE — CONSULTS
[facility] Surgery Consult    Kina Sears MRN# [id number]   Age: 89 year old YOB: 1923     Date of Admission:  10/14/2018    Reason for consult: Left leg and sacral wounds       Requesting physician: Dr. Martinez       Level of consult: Consult, follow and place orders           Impression and Plan:   Impression:   Pt with sacral and left leg wounds.  Sacral wounds 2nd to pressure and leg wounds 2nd to edema.  All superficial      Plan:   Mepilex to all wounds.  Cont offloading sacrum           Chief Complaint:   Sacral and left leg wounds     History is obtained from the patient and electronic health record         History of Present Illness:   This 89 year old female admitted for CHF and was found to have sacral and left leg wounds.  No complaints.  I am asked to see her for her wounds         Past Medical History:     Past Medical History:   Diagnosis Date     DNR no code (do not resuscitate) 10/14/2018     GENERAL OSTEOARTHROSIS 4/8/2005 April 8, 2005 - injection rt knee.     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             Past Surgical History:     Past Surgical History:   Procedure Laterality Date     ANGIOPLASTY  7/2006    LAD Coronary artery     EYE SURGERY      cataract     SURGICAL HISTORY OF -       Cholecystectomy             Social History:     Social History   Substance Use Topics     Smoking status: Never Smoker     Smokeless tobacco: Never Used     Alcohol use No             Family History:     Family History   Problem Relation Age of Onset     C.A.D. Mother      Hypertension Mother      Arthritis Father      Rheumatoid     Arthritis Sister      Cancer - colorectal Sister      Breast Cancer Sister      C.A.D. Brother      Diabetes Brother      Cerebrovascular Disease Brother       "Prostate Cancer No family hx of               Allergies:     Allergies   Allergen Reactions     Sulfa Drugs Rash             Medications:     Current Facility-Administered Medications   Medication     acetaminophen (TYLENOL) tablet 650 mg     furosemide (LASIX) tablet 20 mg     lidocaine (LMX4) kit     lidocaine 1 % 1 mL     lisinopril (PRINIVIL/ZESTRIL) tablet 20 mg     metoprolol succinate (TOPROL-XL) 24 hr tablet 25 mg     miconazole (MICATIN; MICRO GUARD) 2 % powder     naloxone (NARCAN) injection 0.1-0.4 mg     nitroGLYcerin (NITROSTAT) sublingual tablet 0.4 mg     omeprazole (priLOSEC) CR capsule 20 mg     potassium chloride (KLOR-CON) Packet 20-40 mEq     potassium chloride 10 mEq in 100 mL intermittent infusion with 10 mg lidocaine     potassium chloride 10 mEq in 100 mL sterile water intermittent infusion (premix)     potassium chloride 20 mEq in 50 mL intermittent infusion     potassium chloride SA (K-DUR/KLOR-CON M) CR tablet 20-40 mEq     sodium chloride (PF) 0.9% PF flush 3 mL     sodium chloride (PF) 0.9% PF flush 3 mL     sodium chloride (PF) 0.9% PF flush 3 mL     sodium chloride (PF) 0.9% PF flush 3 mL     traMADol (ULTRAM) half-tab 25 mg             Review of Systems:   The review of systems was positive for the following findings.  Sacrum.  The remainder of the review of systems was unremarkable.          Physical Exam:   All vitals have been reviewed  Blood pressure 136/44, pulse 87, temperature 98.9  F (37.2  C), temperature source Oral, resp. rate 23, height 1.499 m (4' 11\"), weight 56.9 kg (125 lb 7.1 oz), SpO2 90 %, not currently breastfeeding.    Intake/Output Summary (Last 24 hours) at 10/16/18 1403  Last data filed at 10/16/18 0849   Gross per 24 hour   Intake             1200 ml   Output             1105 ml   Net               95 ml     SHEENT examination revealed NC/AT, EOMI, (-)icterus.  Examination of the chest revealed CTA  Examination of the heart revealed S1, S2  Examination of the " abdomen revealed Soft, non tender, non distended  The neuromuscular examination was AAOx2, moves all 4 ext but weak.  Back; 4 superficial wound measuring 1x1 to 3x2cm.  Ext: 1x0.3 calf wound clean.          Data:   All laboratory data reviewed  Results for orders placed or performed during the hospital encounter of 10/14/18 (from the past 24 hour(s))   Potassium   Result Value Ref Range    Potassium 4.4 3.4 - 5.3 mmol/L   Basic metabolic panel   Result Value Ref Range    Sodium 139 133 - 144 mmol/L    Potassium 4.2 3.4 - 5.3 mmol/L    Chloride 103 94 - 109 mmol/L    Carbon Dioxide 27 20 - 32 mmol/L    Anion Gap 9 3 - 14 mmol/L    Glucose 118 (H) 70 - 99 mg/dL    Urea Nitrogen 13 7 - 30 mg/dL    Creatinine 0.51 (L) 0.52 - 1.04 mg/dL    GFR Estimate >90 >60 mL/min/1.7m2    GFR Estimate If Black >90 >60 mL/min/1.7m2    Calcium 7.5 (L) 8.5 - 10.1 mg/dL   Hemoglobin   Result Value Ref Range    Hemoglobin 9.6 (L) 11.7 - 15.7 g/dL   Blood gas venous   Result Value Ref Range    Ph Venous 7.55 (H) 7.32 - 7.43 pH    PCO2 Venous 33 (L) 40 - 50 mm Hg    PO2 Venous 58 (H) 25 - 47 mm Hg    Bicarbonate Venous 29 (H) 21 - 28 mmol/L    Base Excess Venous 6.4 mmol/L    FIO2 21         Jorge Jay MD, FACS

## 2018-10-16 NOTE — PROGRESS NOTES
Care Coordinator - Discharge Planning    Admission Date/Time:  10/14/2018  Attending MD:  Ronald Nazario MD     Data  Date of initial CC assessment:  10/15/2018  Chart reviewed, discussed with interdisciplinary team.   Patient was admitted for:   1. Acute congestive heart failure, unspecified heart failure type (H)    2. Pressure injury of coccygeal region, stage 2    3. Ulcer of left lower extremity, limited to breakdown of skin (H)    4. Ulcer of heel, left, with unspecified severity (H)    5. H/O angina pectoris    6. Essential hypertension with goal blood pressure less than 140/90    7. Acute on chronic combined systolic and diastolic congestive heart failure (H)    8. Sinus congestion    9. Gastroesophageal reflux disease, esophagitis presence not specified         Assessment   Full assessment completed in previous note    Coordination of Care and Referrals: Provided patient/family with options for Skilled Nursing Facility, TCU and transportation.      Plan  Anticipated Discharge Date:  10/16/2018  Anticipated Discharge Plan:  Patient will return to [facility] (Phone: [phone] Fax: [phone]) with [facility] transport at 1530.      CTS Handoff completed:  ISMAEL Lopez, MSN, RN, Care Coordinator  [facility] [phone]  [facility] [phone]

## 2018-10-17 NOTE — PROGRESS NOTES
[place] [facility]  PRIMARY CARE PROVIDER AND CLINIC:  No Ref-Primary, Physician   Chief Complaint   Patient presents with     Hospital F/U     [place] Medical Record Number:  [id number]  Place of Service where encounter took place:  [facility] [[id number]]    HPI:    Kina Sears is a 89 year old  (6/9/1923),admitted to the above facility from  [facility].  Hospital stay 10/14/18 through 10/16/18.  Admitted to this facility for  rehab, medical management and nursing care.  HPI information obtained from: facility chart records, facility staff, patient report, [facility] chart review and Care Everywhere [facility] chart review.     [facility] SUMMARY:   Patient responded very well to IV Lasix in less than 12 hours and weaned to room air with resolution of her shortness of breath and significant improvement of her swelling.  She was found to have many superficial ulcerations of her buttocks and left leg at time of admission in general surgery did see and recommend treatment during this observation stay.  Patient also struggled with pain related to the ulcerations as well as ongoing chronic pain but the symptoms were well controlled with as needed Tylenol and Ultram.  Patient did have urinary retention that was present at time of admission, thought secondary to patient recent history of oxybutynin use.  Retention persisted despite a trial of voiding and she will be discharged with Rico catheter in place and urology follow-up scheduled     Current issues are:         Chronic combined systolic and diastolic congestive heart failure (H)  Generalized pain  Pressure injury of coccygeal region, stage 2  Ulcer of left lower extremity, limited to breakdown of skin (H)  Impaired mobility and activities of daily living  Urinary retention    CODE STATUS/ADVANCE DIRECTIVES DISCUSSION:   CPR/Full code   Patient's living condition: lives alone    ALLERGIES:Sulfa drugs  PAST  MEDICAL HISTORY:  has a past medical history of DNR no code (do not resuscitate) (10/14/2018); GENERAL OSTEOARTHROSIS (4/8/2005); High Blood Pressure (5/26/2005); Right knee DJD (4/5/2010); Shoulder arthritis (4/5/2010); and SPASTIC HEMIPLEGIA/PARES UNSP SIDE (5/26/2005).  PAST SURGICAL HISTORY:  has a past surgical history that includes surgical history of - ; angioplasty (7/2006); and Eye surgery.  FAMILY HISTORY: family history includes Arthritis in her father and sister; Breast Cancer in her sister; C.A.D. in her brother and mother; Cancer - colorectal in her sister; Cerebrovascular Disease in her brother; Diabetes in her brother; Hypertension in her mother. There is no history of Prostate Cancer.  SOCIAL HISTORY:  reports that she has never smoked. She has never used smokeless tobacco. She reports that she does not drink alcohol or use illicit drugs.    Post Discharge Medication Reconciliation Status: discharge medications reconciled, continue medications without change.  Current Outpatient Prescriptions   Medication Sig Dispense Refill     acetaminophen (TYLENOL) 325 MG tablet Take 2 tablets (650 mg) by mouth every 4 hours as needed for mild pain 100 tablet      Calcium Carb-Cholecalciferol 500-200 MG-UNIT TABS Take 1 tablet by mouth daily       ferrous sulfate (IRON) 325 (65 Fe) MG tablet Take 325 mg by mouth daily (with breakfast)       fluticasone (FLONASE) 50 MCG/ACT nasal spray Spray 2 sprays into both nostrils daily As needed for nasal congestion. 16 g 3     furosemide (LASIX) 20 MG tablet Take 1 tablet (20 mg) by mouth daily At 0800 and 1/2 tablet (10 mg) by mouth by mouth at 1400 30 tablet 0     lisinopril (PRINIVIL/ZESTRIL) 20 MG tablet TAKE ONE TABLET BY MOUTH EVERY DAY 90 tablet 0     metoprolol succinate (TOPROL-XL) 25 MG 24 hr tablet TAKE ONE TABLET BY MOUTH EVERY DAY 90 tablet 1     nitroglycerin (NITROSTAT) 0.4 MG SL tablet Place 1 tablet (0.4 mg) under the tongue See Admin Instructions EVERY 5  MIN AS NEEDED, UP TO 3 PER EPISODE 90 tablet 0     omeprazole (PRILOSEC) 20 MG capsule Take 1 capsule (20 mg) by mouth daily 90 capsule 1     order for DME Equipment being ordered: 4 wheeled walker 1 Device 0     traMADol (ULTRAM) 50 MG tablet Take 0.5 tablets (25 mg) by mouth every 6 hours as needed for moderate pain 12 tablet 0       ROS:  4 point ROS including Respiratory, CV, GI and , other than that noted in the HPI,  is negative    Exam:  /90  Pulse 101  Temp 97.8  F (36.6  C)  Resp 20  Wt 120 lb 12.8 oz (54.8 kg)  SpO2 96%  BMI 24.4 kg/m2  GENERAL APPEARANCE:  Alert, in no distress  RESP:  respiratory effort and palpation of chest normal, no respiratory distress, lungs sounds clear  CV:  Palpation and auscultation of heart done , regular rate and rhythm, no murmur, rub, or gallop, edema +1 pitting left LE  ABDOMEN:  normal bowel sounds, soft, nontender, no hepatosplenomegaly or other masses  M/S:  Gait and station observed in wheelchair, uses so for transfers, standing and amb with therapy  SKIN:  Inspection and palpation of skin and subcutaneous tissue at - bottom not observed. Dependent rubor present of left LE  PSYCH:  insight and judgement, memory forgetful, affect and mood normal     Lab/Diagnostic data: Hospital labs reviewed.    ASSESSMENT/PLAN:  Chronic combined systolic and diastolic congestive heart failure (H)  Sent back to hospital for increase in shortness of breath. IV lasix was effective.   Stable.  - daily weight  - continue BB, ACE, Lasix    Generalized pain  Complains of pain in her bottom and legs today. Tramadol was effective in the hospital  - schedule low dose tramadol   - schedule tylenol    Pressure injury of coccygeal region, stage 2  Covered with mepilex but due to diarrhea and placement of the ulcers it is not effective. Will try protective paste  - poop goop 4 times daily with pad changes    Ulcer of left lower extremity, limited to breakdown of skin (H)  Covered.  No drainage present. Continue allyvan foam dressing and change every 3 da    Impaired mobility and activities of daily living  Not really motivitated to do therapy. Will need long term care  - goal of care is to maximize function and move to long term care.      Urinary retention  Back to tcu with ramírez. Was not able to void at hospital.   - Trial ramírez removal on Monday.     Orders:  1. Change acetaminophen to 1000 mg TID  2. Tramadol 50 mg tab- take 1/2 (25 mg) po twice daily and 1/2 (25 mg) po every 4 hours prn for pain   3. Check BMP on Friday dx: CHF  4. Nystatin, Zinc Oxide 40% and stomace paste to vitaly-area, Apply QID with pad changes  5. Discharge Mepilox to bottom   6. Imodium 2 mg po daily x 2 days, then 2 mg po daily prn for loose stools  7. On Monday remove ramírez, then bladder scan 1 x per shift     Total time spent with patient visit was 35 min including patient visit and review of past records. Greater than 50% of total time spent with counseling and coordinating care due to diagnosis.    Electronically signed by:  Katie Calderon NP

## 2018-10-19 NOTE — PROGRESS NOTES
[place] [facility]  PRIMARY CARE PROVIDER AND CLINIC:  No Ref-Primary, Physician   Chief Complaint   Patient presents with     Nursing Home Acute     TCU Follow Up     [place] Medical Record Number:  [id number]  Place of Service where encounter took place:  [facility] [[id number]]    HPI:    Kina Sears is a 89 year old  (6/9/1923),admitted to the above facility from  [facility].  Hospital stay 10/14/18 through 10/16/18.  Admitted to this facility for  rehab, medical management and nursing care.  HPI information obtained from: facility chart records, facility staff, patient report, [place] Epic chart review and Care Everywhere Epic chart review.      Current issues are:         Chronic combined systolic and diastolic congestive heart failure (H)  Generalized pain  Pressure injury of coccygeal region, stage 2  Ulcer of left lower extremity, limited to breakdown of skin (H)  Impaired mobility and activities of daily living  Urinary retention  Confusion    ALLERGIES:Sulfa drugs  PAST MEDICAL HISTORY:  has a past medical history of DNR no code (do not resuscitate) (10/14/2018); GENERAL OSTEOARTHROSIS (4/8/2005); High Blood Pressure (5/26/2005); Right knee DJD (4/5/2010); Shoulder arthritis (4/5/2010); and SPASTIC HEMIPLEGIA/PARES UNSP SIDE (5/26/2005).  PAST SURGICAL HISTORY:  has a past surgical history that includes surgical history of - ; angioplasty (7/2006); and Eye surgery.  FAMILY HISTORY: family history includes Arthritis in her father and sister; Breast Cancer in her sister; C.A.D. in her brother and mother; Cancer - colorectal in her sister; Cerebrovascular Disease in her brother; Diabetes in her brother; Hypertension in her mother. There is no history of Prostate Cancer.  SOCIAL HISTORY:  reports that she has never smoked. She has never used smokeless tobacco. She reports that she does not drink alcohol or use illicit drugs.    Current Outpatient  Prescriptions   Medication Sig Dispense Refill     ACETAMINOPHEN PO Take 1,000 mg by mouth 3 times daily       Calcium Carb-Cholecalciferol 500-200 MG-UNIT TABS Take 1 tablet by mouth daily       ferrous sulfate (IRON) 325 (65 Fe) MG tablet Take 325 mg by mouth daily (with breakfast)       fluticasone (FLONASE) 50 MCG/ACT nasal spray Spray 2 sprays into both nostrils daily As needed for nasal congestion. 16 g 3     furosemide (LASIX) 20 MG tablet Take 1 tablet (20 mg) by mouth daily At 0800 and 1/2 tablet (10 mg) by mouth by mouth at 1400 30 tablet 0     lisinopril (PRINIVIL/ZESTRIL) 20 MG tablet TAKE ONE TABLET BY MOUTH EVERY DAY 90 tablet 0     loperamide (IMODIUM) 2 MG capsule Take 2 mg by mouth 4 times daily as needed for diarrhea       metoprolol succinate (TOPROL-XL) 25 MG 24 hr tablet TAKE ONE TABLET BY MOUTH EVERY DAY 90 tablet 1     nitroglycerin (NITROSTAT) 0.4 MG SL tablet Place 1 tablet (0.4 mg) under the tongue See Admin Instructions EVERY 5 MIN AS NEEDED, UP TO 3 PER EPISODE 90 tablet 0     omeprazole (PRILOSEC) 20 MG capsule Take 1 capsule (20 mg) by mouth daily 90 capsule 1     order for DME Equipment being ordered: 4 wheeled walker 1 Device 0     traMADol (ULTRAM) 50 MG tablet Take 0.5 tablets (25 mg) by mouth every 6 hours as needed for moderate pain 12 tablet 0       ROS:  4 point ROS including Respiratory, CV, GI and , other than that noted in the HPI,  is negative    Exam:  /68  Pulse 92  Temp 98.4  F (36.9  C)  Resp (!) 36  Wt 129 lb 3.2 oz (58.6 kg)  SpO2 96%  BMI 26.1 kg/m2  GENERAL APPEARANCE:  Alert, in no distress  RESP:  respiratory effort and palpation of chest normal, no respiratory distress, lungs sounds clear  CV:  Palpation and auscultation of heart done , regular rate and rhythm, no murmur, rub, or gallop, edema +1 pitting left LE  ABDOMEN:  normal bowel sounds, soft, nontender, no hepatosplenomegaly or other masses  M/S:  Gait and station observed in wheelchair, uses  so for transfers, standing and amb with therapy  SKIN:  Inspection and palpation of skin and subcutaneous tissue at - bottom not observed. Dependent rubor present of left LE  PSYCH:  insight and judgement, memory forgetful, affect and mood normal     Lab/Diagnostic data: Facility labs reviewed.    ASSESSMENT/PLAN:  Chronic combined systolic and diastolic congestive heart failure (H)  Stable.  - daily weight  - continue BB, ACE, Lasix    Generalized pain / confusion  New onset of confusion noted yesterday after patient received 3 doses of her tramadol. She was delirous and stated people were trying to harm her. She continues to be upset that someone gave her that medication after she told them not to. Tramadol has been on hold since yesterday. She relays that she is having some pain in her bottom but it improves after she gets off of it. She states she refused to walk in therapy.   - discontinue tramadol  - continue scheduled tylenol 1,000 mg po TID and additional 500 mg po BID PRN ok to go up to 4 g tyelnol   - labs show no reason for delirium    Ulcer of left lower extremity, limited to breakdown of skin (H)  Covered. No drainage present. Continue allyvan foam dressing and change every 3 da    Impaired mobility and activities of daily living  Not really motivitated to do therapy. Will need long term care  - goal of care is to maximize function and move to long term care.      Urinary retention  Back to tcu with ramírez. Was not able to void at hospital.   - Trial ramírez removal on Monday.     Orders:  1. Discontinue tramadol  2. Additional tylenol 500 mg po bid      Electronically signed by:  Katie Calderon NP

## 2018-10-22 PROBLEM — H57.02 UNEQUAL PUPILS: Status: ACTIVE | Noted: 2018-01-01

## 2018-10-22 PROBLEM — I50.23 ACUTE ON CHRONIC SYSTOLIC CONGESTIVE HEART FAILURE (H): Status: RESOLVED | Noted: 2018-01-01 | Resolved: 2018-01-01

## 2018-10-22 PROBLEM — Z66 DNR NO CODE (DO NOT RESUSCITATE): Status: RESOLVED | Noted: 2018-01-01 | Resolved: 2018-01-01

## 2018-10-22 PROBLEM — I25.10 CORONARY ARTERY DISEASE INVOLVING NATIVE CORONARY ARTERY OF NATIVE HEART WITHOUT ANGINA PECTORIS: Status: ACTIVE | Noted: 2018-01-01

## 2018-10-22 PROBLEM — I50.42 CHRONIC COMBINED SYSTOLIC AND DIASTOLIC CHF (CONGESTIVE HEART FAILURE) (H): Status: ACTIVE | Noted: 2017-01-27

## 2018-10-22 PROBLEM — Z86.79 HISTORY OF ATRIAL FIBRILLATION: Status: RESOLVED | Noted: 2018-01-01 | Resolved: 2018-01-01

## 2018-10-22 PROBLEM — I50.20 SYSTOLIC HEART FAILURE (H): Status: RESOLVED | Noted: 2018-01-01 | Resolved: 2018-01-01
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
## 2018-10-22 PROBLEM — L89.159 DECUBITUS ULCER OF COCCYGEAL REGION: Status: RESOLVED | Noted: 2018-01-01 | Resolved: 2018-01-01

## 2018-10-22 PROBLEM — L03.90 CELLULITIS: Status: ACTIVE | Noted: 2018-01-01

## 2018-10-22 PROBLEM — R53.1 WEAKNESS: Status: ACTIVE | Noted: 2018-01-01

## 2018-10-22 PROBLEM — K21.9 GASTROESOPHAGEAL REFLUX DISEASE WITHOUT ESOPHAGITIS: Status: ACTIVE | Noted: 2018-01-01
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
## 2018-10-22 PROBLEM — N17.9 AKI (ACUTE KIDNEY INJURY) (H): Status: ACTIVE | Noted: 2018-01-01

## 2018-10-22 PROBLEM — L03.90 CELLULITIS: Status: RESOLVED | Noted: 2018-01-01 | Resolved: 2018-01-01

## 2018-10-22 PROBLEM — I48.19 PERSISTENT ATRIAL FIBRILLATION (H): Status: ACTIVE | Noted: 2018-01-01

## 2018-10-22 NOTE — MR AVS SNAPSHOT
After Visit Summary   10/22/2018    Kina Sears    MRN: [id number]           Patient Information     Date Of Birth          6/9/1923        Visit Information        Provider Department      10/22/2018 11:41 AM Cindi Ramos MD [facility]        Today's Diagnoses     Chronic combined systolic and diastolic CHF (congestive heart failure) (H)        Ulcer of left lower extremity, limited to breakdown of skin (H)        Urine retention        Weakness        Gastroesophageal reflux disease without esophagitis        Primary osteoarthritis involving multiple joints           Follow-ups after your visit        Your next 10 appointments already scheduled     Oct 24, 2018  8:45 AM CDT   New Visit with Conor Quach MD   [facility] ([facility]    [address] [facility] [address] [phone]              Who to contact     If you have questions or need follow up information about today's clinic visit or your schedule please contact [facility] directly at [phone].  Normal or non-critical lab and imaging results will be communicated to you by Zeushart, letter or phone within 4 business days after the clinic has received the results. If you do not hear from us within 7 days, please contact the clinic through [employer] or phone. If you have a critical or abnormal lab result, we will notify you by phone as soon as possible.  Submit refill requests through Kereos or call your pharmacy and they will forward the refill request to us. Please allow 3 business days for your refill to be completed.          Additional Information About Your Visit        MyChart Information     Kereos gives you secure access to your electronic health record. If you see a primary care provider, you can also send messages to your care team and make appointments. If you have questions, please call your primary care clinic.  If you do not have a  primary care provider, please call [phone] and they will assist you.        Care EveryWhere ID     This is your Care EveryWhere ID. This could be used by other organizations to access your [place] medical records  [id number]        Your Vitals Were     Pulse Temperature Respirations Pulse Oximetry BMI (Body Mass Index)       101 97.8  F (36.6  C) 22 93% 25.13 kg/m2        Blood Pressure from Last 3 Encounters:   10/22/18 143/84   10/19/18 145/68   10/17/18 151/90    Weight from Last 3 Encounters:   10/22/18 124 lb 6.4 oz (56.4 kg)   10/19/18 129 lb 3.2 oz (58.6 kg)   10/17/18 120 lb 12.8 oz (54.8 kg)              Today, you had the following     No orders found for display         Today's Medication Changes          These changes are accurate as of 10/22/18  5:21 PM.  If you have any questions, ask your nurse or doctor.               Stop taking these medicines if you haven't already. Please contact your care team if you have questions.     order for DME                    Primary Care Provider Fax #    Physician No Ref-Primary [phone]       No address on file        Equal Access to Services     NATHALIE SMITH : Alcira Day, wacicida charly, qacuba kaalmada camilo, dallas cisneros. So [facility] [phone].    ATENCIÓN: Si habla español, tiene a delgado disposición servicios gratuitos de asistencia lingüística. Smitha al [phone].    We comply with applicable federal civil rights laws and [place] laws. We do not discriminate on the basis of race, color, national origin, age, disability, sex, sexual orientation, or gender identity.            Thank you!     Thank you for choosing [facility]  for your care. Our goal is always to provide you with excellent care. Hearing back from our patients is one way we can continue to improve our services. Please take a few minutes to complete the written survey that you may receive in the mail after your visit  with us. Thank you!             Your Updated Medication List - Protect others around you: Learn how to safely use, store and throw away your medicines at www.disposemymeds.org.          This list is accurate as of 10/22/18  5:21 PM.  Always use your most recent med list.                   Brand Name Dispense Instructions for use Diagnosis    * ACETAMINOPHEN PO      Take 1,000 mg by mouth 3 times daily        * ACETAMINOPHEN PO      Take 500 mg by mouth 2 times daily as needed for pain        Calcium Carb-Cholecalciferol 500-200 MG-UNIT Tabs      Take 1 tablet by mouth daily        ferrous sulfate 325 (65 Fe) MG tablet    IRON     Take 325 mg by mouth daily (with breakfast)        fluticasone 50 MCG/ACT spray    FLONASE    16 g    Spray 2 sprays into both nostrils daily As needed for nasal congestion.    Sinus congestion       furosemide 20 MG tablet    LASIX    30 tablet    Take 1 tablet (20 mg) by mouth daily At 0800 and 1/2 tablet (10 mg) by mouth by mouth at 1400    Acute congestive heart failure, unspecified heart failure type (H)       lisinopril 20 MG tablet    PRINIVIL/ZESTRIL    90 tablet    TAKE ONE TABLET BY MOUTH EVERY DAY    Essential hypertension with goal blood pressure less than 140/90       loperamide 2 MG capsule    IMODIUM     Take 2 mg by mouth 4 times daily as needed for diarrhea        metoprolol succinate 25 MG 24 hr tablet    TOPROL-XL    90 tablet    TAKE ONE TABLET BY MOUTH EVERY DAY    Acute on chronic combined systolic and diastolic congestive heart failure (H)       nitroGLYcerin 0.4 MG sublingual tablet    NITROSTAT    90 tablet    Place 1 tablet (0.4 mg) under the tongue See Admin Instructions EVERY 5 MIN AS NEEDED, UP TO 3 PER EPISODE    H/O angina pectoris       omeprazole 20 MG CR capsule    priLOSEC    90 capsule    Take 1 capsule (20 mg) by mouth daily    Gastroesophageal reflux disease, esophagitis presence not specified       * Notice:  This list has 2 medication(s) that are the  same as other medications prescribed for you. Read the directions carefully, and ask your doctor or other care provider to review them with you.

## 2018-10-22 NOTE — PROGRESS NOTES
HISTORY OF PRESENT ILLNESS:  Kina is a 89 year old female, (6/9/1923), admitted to [facility] from  [facility].  Hospital stay 10/14/18 through 10/16/18.  Admitted to this facility for rehab, medical management and nursing care.  HPI information obtained from: facility chart records, facility staff, patient report, [facility] chart review and Care Everywhere [facility] chart review.      BRIEF HOSPITAL SUMMARY:   Patient responded very well to IV Lasix in less than 12 hours and weaned to room air with resolution of her shortness of breath and significant improvement of her swelling.  She was found to have many superficial ulcerations of her buttocks and left leg at time of admission in general surgery did see and recommend treatment during this observation stay.  Patient also struggled with pain related to the ulcerations as well as ongoing chronic pain but the symptoms were well controlled with as needed Tylenol and Ultram.  Patient did have urinary retention that was present at time of admission, thought secondary to patient recent history of oxybutynin use.  Retention persisted despite a trial of voiding and she will be discharged with Rico catheter in place and urology follow-up scheduled      Past Medical History/  Patient Active Problem List    Diagnosis Date Noted     Coronary artery disease involving native coronary artery of native heart without angina pectoris 10/22/2018     Priority: Medium     Gastroesophageal reflux disease without esophagitis 10/22/2018     Priority: Medium     Persistent atrial fibrillation (H) 10/22/2018     Priority: Medium     Weakness 10/22/2018     Priority: Medium     Ulcer of left lower extremity, limited to breakdown of skin (H) 10/14/2018     Priority: Medium     Ulcer of heel, left, with unspecified severity (H) 10/14/2018     Priority: Medium     Personal history of urinary tract infection - pseudomonas 10/14/2018     Priority: Medium      Urine retention 10/14/2018     Priority: Medium     Hx of acute renal failure 10/14/2018     Priority: Medium     History of gout 10/14/2018     Priority: Medium     MAGDALENO (acute kidney injury) (H) 10/02/2018     Priority: Medium     Unequal pupils 10/02/2018     Priority: Medium     Chronic combined systolic and diastolic CHF (congestive heart failure) (H) 01/27/2017     Priority: Medium     BPPV (benign paroxysmal positional vertigo) 02/26/2016     Priority: Medium     Falls frequently 12/09/2014     Priority: Medium     Atrophy of vulva 04/15/2014     Priority: Medium     Anemia 03/20/2013     Priority: Medium     Hypertension goal BP (blood pressure) < 140/90 12/17/2010     Priority: Medium     Hyperlipidemia LDL goal <100 10/31/2010     Priority: Medium     Right knee DJD 04/05/2010     Priority: Medium     Shoulder arthritis 04/05/2010     Priority: Medium     left shoulder limited ROM       [facility] 08/22/2006     Priority: Medium     8/15/2006 BRIAN Smith [place]: [phone]: Services recommended: Personal Care/HHA, Homemaker, Meals on Wheels, Lifeline       Arteritis (H) 07/18/2006     Priority: Medium     7/18/06 [facility]: Fever and rash with a livedo reticularis pattern. Rheumatology consult  Problem list name updated by automated process. Provider to review       Abnormal blood chemistry 03/30/2006     Priority: Medium     March 30, 2006 - Fasting today.  Consider metformin if elevated and A1c >7.  August 1, 2006 - Unclear if diabetic criteria met with hospitalization.  Recheck labs in 1 month.  Problem list name updated by automated process. Provider to review       Spastic hemiplegia (H) 05/26/2005     Priority: Medium     Right lower extremity. 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  Problem list name updated by automated process. Provider to review       Primary osteoarthritis involving multiple joints 04/08/2005     Priority: Medium     Possibly post  paralysis pain.  April 8, 2005 - injection rt knee.  5/20/05 Basilio: degenerative arthritis rt knee. First Synvisc injection. Repeat next week.  March 30, 2006 - Possible inflammatory component given characteristic hand deformities.  Rheumatology consult.  August 1, 2006 - Relafen and if not improving can add Ultram.  March 30, 2007 - Ultram and Synvisc didn't work. Discussed joint replacement.  September 26, 2007 - not interested in surgery.  Offered injection at [place] vs trial here.  Opted to try injection here.  Discussed risk/benefit of intraarticular injection including bleeding, infection, steroid flair etc.  Pt requested procedure be done.    1cc Kenalog (40mg/cc)  1cc 1% lidocaine  1cc marcaine    Injected into knee using sterile technique in sitting position. Immediate relief noted.        Advanced directives, Existing AD rec 7/18/2012 notorized 07/18/2012     Priority: Low           Kina Sears has a designated Health Care Agent or Proxy listed in a legal Advance Directive document    Name:Javier Sears  Relationship to patient:son  Phone(s):[phone]  Alternate Name:William Whalen  Relationship to patient: son  Phone(s):[phone]           Health Care Home 12/13/2011     Priority: Low     *See Letters for H Care Plan: My Access Plan             Past Surgical History:   Procedure Laterality Date     ANGIOPLASTY  7/2006    LAD Coronary artery     EYE SURGERY      cataract     SURGICAL HISTORY OF -       Cholecystectomy       Family History   Problem Relation Age of Onset     C.A.D. Mother      Hypertension Mother      Arthritis Father      Rheumatoid     Arthritis Sister      Cancer - colorectal Sister      Breast Cancer Sister      C.A.D. Brother      Diabetes Brother      Cerebrovascular Disease Brother      Prostate Cancer No family hx of            Social History     Social History     Marital status:      Spouse name: N/A     Number of children: N/A     Years of education: N/A      Occupational History     Not on file.     Social History Main Topics     Smoking status: Never Smoker     Smokeless tobacco: Never Used     Alcohol use No     Drug use: No     Sexual activity: No     Other Topics Concern     Parent/Sibling W/ Cabg, Mi Or Angioplasty Before 65f 55m? No     Social History Narrative    Patient was residing in [facility] until the last hospitalization. Patient was discharged to [facility] with the plan to transition to long term care with palliative care or hospice.         Current Outpatient Prescriptions   Medication Sig     ACETAMINOPHEN PO Take 500 mg by mouth 2 times daily as needed for pain     ACETAMINOPHEN PO Take 1,000 mg by mouth 3 times daily     Calcium Carb-Cholecalciferol 500-200 MG-UNIT TABS Take 1 tablet by mouth daily     ferrous sulfate (IRON) 325 (65 Fe) MG tablet Take 325 mg by mouth daily (with breakfast)     fluticasone (FLONASE) 50 MCG/ACT nasal spray Spray 2 sprays into both nostrils daily As needed for nasal congestion.     furosemide (LASIX) 20 MG tablet Take 1 tablet (20 mg) by mouth daily At 0800 and 1/2 tablet (10 mg) by mouth by mouth at 1400     lisinopril (PRINIVIL/ZESTRIL) 20 MG tablet TAKE ONE TABLET BY MOUTH EVERY DAY     loperamide (IMODIUM) 2 MG capsule Take 2 mg by mouth 4 times daily as needed for diarrhea     metoprolol succinate (TOPROL-XL) 25 MG 24 hr tablet TAKE ONE TABLET BY MOUTH EVERY DAY     nitroglycerin (NITROSTAT) 0.4 MG SL tablet Place 1 tablet (0.4 mg) under the tongue See Admin Instructions EVERY 5 MIN AS NEEDED, UP TO 3 PER EPISODE     omeprazole (PRILOSEC) 20 MG capsule Take 1 capsule (20 mg) by mouth daily     No current facility-administered medications for this visit.        Allergies   Allergen Reactions     Sulfa Drugs Rash       Information reviewed:  Medications, vital signs, orders, and nursing notes.    Review of Systems   Constitutional: Negative for appetite change and fever.   HENT: Negative for  congestion, ear pain and sore throat.    Eyes: Negative for pain.   Respiratory: Negative for cough and shortness of breath.    Cardiovascular: Positive for peripheral edema (She feels it is improving). Negative for chest pain.   Gastrointestinal: Negative for abdominal pain, constipation, heartburn and nausea.   Genitourinary: Negative for frequency.   Musculoskeletal: Negative for arthralgias.   Neurological: Negative for dizziness and headaches.   Psychiatric/Behavioral: Negative for mood changes.        She states she feels well because she slept better.        OBJECTIVE:  /84  Pulse 101  Temp 97.8  F (36.6  C)  Resp 22  Wt 124 lb 6.4 oz (56.4 kg)  SpO2 93%  BMI 25.13 kg/m2  Physical Exam   Constitutional: She appears well-developed and well-nourished. She is cooperative. No distress.   HENT:   Right Ear: External ear normal.   Left Ear: External ear normal.   Nose: Nose normal.   Mouth/Throat: Oropharynx is clear and moist.   Eyes: Conjunctivae are normal. Right eye exhibits no discharge. Left eye exhibits no discharge. Pupils are unequal (Appears chronic as was listed in her [facility] records).   Neck: Neck supple. No tracheal deviation present. No thyromegaly present.   Cardiovascular: Normal rate, regular rhythm, S1 normal, S2 normal and normal heart sounds.    No murmur heard.  Pulmonary/Chest: Effort normal and breath sounds normal. No respiratory distress. She has no wheezes. She has no rales.   Abdominal: Soft. Bowel sounds are normal. She exhibits no mass. There is no tenderness.   Musculoskeletal: Normal range of motion. She exhibits edema (1+ bilateral edema).   Lymphadenopathy:     She has no cervical adenopathy.   Neurological: She is alert. She has normal strength and normal reflexes. She exhibits normal muscle tone.   Skin: Skin is warm and dry. No rash noted.   Has wound on left lateral calf, no erythema or warmth, no drainage.   Psychiatric: She has a normal mood and affect.         ASSESSMENT/PLAN:    Chronic combined systolic and diastolic CHF (congestive heart failure) (H)  Stable.  - daily weight  - continue BB, ACE, Lasix    Ulcer of left lower extremity, limited to breakdown of skin (H)  Covered. No drainage present. Continue current dressing.  NO evidence of infection    Urine retention  Rico removed today.  Will monitor for ability to urinate, bladder scan prn.    Weakness  Continue Physical Therapy and Occupational Therapy, she tells me she was able to walk with help today.    Gastroesophageal reflux disease without esophagitis  Controlled on proton pump inhibitor     Primary osteoarthritis involving multiple joints  On Tylenol, had confusion with tramadol          Cindi Ramos MD

## 2018-10-22 NOTE — LETTER
10/22/2018        RE: Kina Sears  [facility]  [address]   [place] [address]        HISTORY OF PRESENT ILLNESS:  Kina is a 89 year old female, (6/9/1923), admitted to [facility] from  [facility].  Hospital stay 10/14/18 through 10/16/18.  Admitted to this facility for rehab, medical management and nursing care.  HPI information obtained from: facility chart records, facility staff, patient report, [facility] chart review and Care Everywhere [facility] chart review.      BRIEF HOSPITAL SUMMARY:   Patient responded very well to IV Lasix in less than 12 hours and weaned to room air with resolution of her shortness of breath and significant improvement of her swelling.  She was found to have many superficial ulcerations of her buttocks and left leg at time of admission in general surgery did see and recommend treatment during this observation stay.  Patient also struggled with pain related to the ulcerations as well as ongoing chronic pain but the symptoms were well controlled with as needed Tylenol and Ultram.  Patient did have urinary retention that was present at time of admission, thought secondary to patient recent history of oxybutynin use.  Retention persisted despite a trial of voiding and she will be discharged with Rico catheter in place and urology follow-up scheduled      Past Medical History/  Patient Active Problem List    Diagnosis Date Noted     Coronary artery disease involving native coronary artery of native heart without angina pectoris 10/22/2018     Priority: Medium     Gastroesophageal reflux disease without esophagitis 10/22/2018     Priority: Medium     Persistent atrial fibrillation (H) 10/22/2018     Priority: Medium     Weakness 10/22/2018     Priority: Medium     Ulcer of left lower extremity, limited to breakdown of skin (H) 10/14/2018     Priority: Medium     Ulcer of heel, left, with unspecified severity (H) 10/14/2018      Priority: Medium     Personal history of urinary tract infection - pseudomonas 10/14/2018     Priority: Medium     Urine retention 10/14/2018     Priority: Medium     Hx of acute renal failure 10/14/2018     Priority: Medium     History of gout 10/14/2018     Priority: Medium     MAGDALENO (acute kidney injury) (H) 10/02/2018     Priority: Medium     Unequal pupils 10/02/2018     Priority: Medium     Chronic combined systolic and diastolic CHF (congestive heart failure) (H) 01/27/2017     Priority: Medium     BPPV (benign paroxysmal positional vertigo) 02/26/2016     Priority: Medium     Falls frequently 12/09/2014     Priority: Medium     Atrophy of vulva 04/15/2014     Priority: Medium     Anemia 03/20/2013     Priority: Medium     Hypertension goal BP (blood pressure) < 140/90 12/17/2010     Priority: Medium     Hyperlipidemia LDL goal <100 10/31/2010     Priority: Medium     Right knee DJD 04/05/2010     Priority: Medium     Shoulder arthritis 04/05/2010     Priority: Medium     left shoulder limited ROM       [facility] 08/22/2006     Priority: Medium     8/15/2006 [facility] [place]: [phone]: Services recommended: Personal Care/HHA, Homemaker, Meals on Wheels, Lifeline       Arteritis (H) 07/18/2006     Priority: Medium     7/18/06 [facility]: Fever and rash with a livedo reticularis pattern. Rheumatology consult  Problem list name updated by automated process. Provider to review       Abnormal blood chemistry 03/30/2006     Priority: Medium     March 30, 2006 - Fasting today.  Consider metformin if elevated and A1c >7.  August 1, 2006 - Unclear if diabetic criteria met with hospitalization.  Recheck labs in 1 month.  Problem list name updated by automated process. Provider to review       Spastic hemiplegia (H) 05/26/2005     Priority: Medium     Right lower extremity. 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  Problem list name updated by automated process. Provider to review        Primary osteoarthritis involving multiple joints 04/08/2005     Priority: Medium     Possibly post paralysis pain.  April 8, 2005 - injection rt knee.  5/20/05 Basilio: degenerative arthritis rt knee. First Synvisc injection. Repeat next week.  March 30, 2006 - Possible inflammatory component given characteristic hand deformities.  Rheumatology consult.  August 1, 2006 - Relafen and if not improving can add Ultram.  March 30, 2007 - Ultram and Synvisc didn't work. Discussed joint replacement.  September 26, 2007 - not interested in surgery.  Offered injection at [place] vs trial here.  Opted to try injection here.  Discussed risk/benefit of intraarticular injection including bleeding, infection, steroid flair etc.  Pt requested procedure be done.    1cc Kenalog (40mg/cc)  1cc 1% lidocaine  1cc marcaine    Injected into knee using sterile technique in sitting position. Immediate relief noted.        Advanced directives, Existing AD rec 7/18/2012 notorized 07/18/2012     Priority: Low           Kina Sears has a designated Health Care Agent or Proxy listed in a legal Advance Directive document    Name:Javier Sears  Relationship to patient:son  Phone(s):[phone]  Alternate Name:William Whalen  Relationship to patient: son  Phone(s):[phone]           Health Care Home 12/13/2011     Priority: Low     *See Letters for [facility] Care Plan: My Access Plan             Past Surgical History:   Procedure Laterality Date     ANGIOPLASTY  7/2006    LAD Coronary artery     EYE SURGERY      cataract     SURGICAL HISTORY OF -       Cholecystectomy       Family History   Problem Relation Age of Onset     C.A.D. Mother      Hypertension Mother      Arthritis Father      Rheumatoid     Arthritis Sister      Cancer - colorectal Sister      Breast Cancer Sister      C.A.D. Brother      Diabetes Brother      Cerebrovascular Disease Brother      Prostate Cancer No family hx of            Social History     Social History      Marital status:      Spouse name: N/A     Number of children: N/A     Years of education: N/A     Occupational History     Not on file.     Social History Main Topics     Smoking status: Never Smoker     Smokeless tobacco: Never Used     Alcohol use No     Drug use: No     Sexual activity: No     Other Topics Concern     Parent/Sibling W/ Cabg, Mi Or Angioplasty Before 65f 55m? No     Social History Narrative    Patient was residing in [facility] until the last hospitalization. Patient was discharged to [facility] with the plan to transition to long term care with palliative care or hospice.         Current Outpatient Prescriptions   Medication Sig     ACETAMINOPHEN PO Take 500 mg by mouth 2 times daily as needed for pain     ACETAMINOPHEN PO Take 1,000 mg by mouth 3 times daily     Calcium Carb-Cholecalciferol 500-200 MG-UNIT TABS Take 1 tablet by mouth daily     ferrous sulfate (IRON) 325 (65 Fe) MG tablet Take 325 mg by mouth daily (with breakfast)     fluticasone (FLONASE) 50 MCG/ACT nasal spray Spray 2 sprays into both nostrils daily As needed for nasal congestion.     furosemide (LASIX) 20 MG tablet Take 1 tablet (20 mg) by mouth daily At 0800 and 1/2 tablet (10 mg) by mouth by mouth at 1400     lisinopril (PRINIVIL/ZESTRIL) 20 MG tablet TAKE ONE TABLET BY MOUTH EVERY DAY     loperamide (IMODIUM) 2 MG capsule Take 2 mg by mouth 4 times daily as needed for diarrhea     metoprolol succinate (TOPROL-XL) 25 MG 24 hr tablet TAKE ONE TABLET BY MOUTH EVERY DAY     nitroglycerin (NITROSTAT) 0.4 MG SL tablet Place 1 tablet (0.4 mg) under the tongue See Admin Instructions EVERY 5 MIN AS NEEDED, UP TO 3 PER EPISODE     omeprazole (PRILOSEC) 20 MG capsule Take 1 capsule (20 mg) by mouth daily     No current facility-administered medications for this visit.        Allergies   Allergen Reactions     Sulfa Drugs Rash       Information reviewed:  Medications, vital signs, orders, and nursing  notes.    Review of Systems   Constitutional: Negative for appetite change and fever.   HENT: Negative for congestion, ear pain and sore throat.    Eyes: Negative for pain.   Respiratory: Negative for cough and shortness of breath.    Cardiovascular: Positive for peripheral edema (She feels it is improving). Negative for chest pain.   Gastrointestinal: Negative for abdominal pain, constipation, heartburn and nausea.   Genitourinary: Negative for frequency.   Musculoskeletal: Negative for arthralgias.   Neurological: Negative for dizziness and headaches.   Psychiatric/Behavioral: Negative for mood changes.        She states she feels well because she slept better.        OBJECTIVE:  /84  Pulse 101  Temp 97.8  F (36.6  C)  Resp 22  Wt 124 lb 6.4 oz (56.4 kg)  SpO2 93%  BMI 25.13 kg/m2  Physical Exam   Constitutional: She appears well-developed and well-nourished. She is cooperative. No distress.   HENT:   Right Ear: External ear normal.   Left Ear: External ear normal.   Nose: Nose normal.   Mouth/Throat: Oropharynx is clear and moist.   Eyes: Conjunctivae are normal. Right eye exhibits no discharge. Left eye exhibits no discharge. Pupils are unequal (Appears chronic as was listed in her [facility] records).   Neck: Neck supple. No tracheal deviation present. No thyromegaly present.   Cardiovascular: Normal rate, regular rhythm, S1 normal, S2 normal and normal heart sounds.    No murmur heard.  Pulmonary/Chest: Effort normal and breath sounds normal. No respiratory distress. She has no wheezes. She has no rales.   Abdominal: Soft. Bowel sounds are normal. She exhibits no mass. There is no tenderness.   Musculoskeletal: Normal range of motion. She exhibits edema (1+ bilateral edema).   Lymphadenopathy:     She has no cervical adenopathy.   Neurological: She is alert. She has normal strength and normal reflexes. She exhibits normal muscle tone.   Skin: Skin is warm and dry. No rash noted.   Has wound on  left lateral calf, no erythema or warmth, no drainage.   Psychiatric: She has a normal mood and affect.        ASSESSMENT/PLAN:    Chronic combined systolic and diastolic CHF (congestive heart failure) (H)  Stable.  - daily weight  - continue BB, ACE, Lasix    Ulcer of left lower extremity, limited to breakdown of skin (H)  Covered. No drainage present. Continue current dressing.  NO evidence of infection    Urine retention  Rico removed today.  Will monitor for ability to urinate, bladder scan prn.    Weakness  Continue Physical Therapy and Occupational Therapy, she tells me she was able to walk with help today.    Gastroesophageal reflux disease without esophagitis  Controlled on proton pump inhibitor     Primary osteoarthritis involving multiple joints  On Tylenol, had confusion with tramadol          Cindi Ramos MD       Sincerely,        Cindi Ramos MD

## 2018-10-29 NOTE — PROGRESS NOTES
[facility]  PRIMARY CARE PROVIDER AND CLINIC:  No Ref-Primary, Physician   Chief Complaint   Patient presents with     Nursing Home Acute     TCU Follow up     [facility] Medical Record Number:  [id number]  Place of Service where encounter took place:  [facility] [[id number]]    HPI:    Kina Sears is a 89 year old  (6/9/1923),admitted to the above facility from  [facility].  Hospital stay 10/14/18 through 10/16/18.  Admitted to this facility for  rehab, medical management and nursing care.  HPI information obtained from: facility chart records, facility staff, patient report, [facility] Epic chart review and Care Everywhere Epic chart review.      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  PAST SURGICAL HISTORY:  has a past surgical history that includes surgical history of - ; angioplasty (7/2006); and Eye surgery.  FAMILY HISTORY: family history includes Arthritis in her father and sister; Breast Cancer in her sister; C.A.D. in her brother and mother; Cancer - colorectal in her sister; Cerebrovascular Disease in her brother; Diabetes in her brother; Hypertension in her mother. There is no history of Prostate Cancer.  SOCIAL HISTORY:  reports that she has never smoked. She has never used smokeless tobacco. She reports that she does not drink alcohol or use illicit drugs.    Current Outpatient Prescriptions   Medication Sig Dispense Refill      ACETAMINOPHEN PO Take 500 mg by mouth 2 times daily as needed for pain       ACETAMINOPHEN PO Take 1,000 mg by mouth 3 times daily       Calcium Carb-Cholecalciferol 500-200 MG-UNIT TABS Take 1 tablet by mouth daily       ferrous sulfate (IRON) 325 (65 Fe) MG tablet Take 325 mg by mouth daily (with breakfast)       fluticasone (FLONASE) 50 MCG/ACT nasal spray Spray 2 sprays into both nostrils daily As needed for nasal congestion. 16 g 3     furosemide (LASIX) 20 MG tablet Take 1 tablet (20 mg) by mouth daily At 0800 and 1/2 tablet (10 mg) by mouth by mouth at 1400 30 tablet 0     lisinopril (PRINIVIL/ZESTRIL) 20 MG tablet TAKE ONE TABLET BY MOUTH EVERY DAY 90 tablet 0     loperamide (IMODIUM) 2 MG capsule Take 2 mg by mouth 4 times daily as needed for diarrhea       LORAZEPAM PO Take 0.25 mg by mouth every 6 hours as needed for anxiety       metoprolol succinate (TOPROL-XL) 25 MG 24 hr tablet TAKE ONE TABLET BY MOUTH EVERY DAY 90 tablet 1     nitroglycerin (NITROSTAT) 0.4 MG SL tablet Place 1 tablet (0.4 mg) under the tongue See Admin Instructions EVERY 5 MIN AS NEEDED, UP TO 3 PER EPISODE 90 tablet 0     omeprazole (PRILOSEC) 20 MG capsule Take 1 capsule (20 mg) by mouth daily 90 capsule 1     QUETIAPINE FUMARATE PO Take 25 mg by mouth 2 times daily as needed       QUETIAPINE FUMARATE PO Take 25 mg by mouth daily         ROS:  4 point ROS including Respiratory, CV, GI and , other than that noted in the HPI,  is negative    Exam:  /68  Pulse 82  Temp 98.4  F (36.9  C)  Resp 24  Wt 117 lb 11.2 oz (53.4 kg)  SpO2 93%  BMI 23.77 kg/m2  GENERAL APPEARANCE:  Alert, oriented to person and place.   RESP:  respiratory effort and palpation of chest normal, no respiratory distress, lungs sounds clear  CV:  Palpation and auscultation of heart done , regular rate and rhythm, no murmur, rub, or gallop, edema none  ABDOMEN:  normal bowel sounds, soft, nontender, no hepatosplenomegaly or other masses  M/S:  Gait and  station observed in wheelchair, uses so for transfers,   SKIN:  Inspection and palpation of skin and subcutaneous tissue at - bottom not observed. Dependent rubor present of left LE  PSYCH:  insight and judgement, memory forgetful, affect and mood normal     Lab/Diagnostic data: Facility labs reviewed.    ASSESSMENT/PLAN:  Chronic combined systolic and diastolic congestive heart failure (H)  Weight loss noted and calling out for water.   - daily weight  - continue BB, ACE,   - cut back on lasix  - labs pending today    Generalized pain / confusion  10/19 New onset of confusion noted yesterday after patient received 3 doses of her tramadol. She was delirous and stated people were trying to harm her. She continues to be upset that someone gave her that medication after she told them not to. Tramadol has been on hold since yesterday. She relays that she is having some pain in her bottom but it improves after she gets off of it. She states she refused to walk in therapy.   - discontinue tramadol  - continue scheduled tylenol 1,000 mg po TID and additional 500 mg po BID PRN ok to go up to 4 g tyelnol   - labs show no reason for delirium  10/29 spoke with son on 10/26 regarding his concerns about patient delirium. Labs ordered and comfort care approach wanted. Ativan added. Patient had a difficult weakend and on Sunday was seen hitting her head against the table. Seroquel was ordered. She was given 3 doses yesterday. Nurse states she slept through breakfast but was awake and went to activity without report of problems. Due to concerns of self harm will continue seroquel  - seroquel 25 mg po twice daily and twice daily PRN. Will need to see patient again in 2 weeks for follow up.     Ulcer of left lower extremity, limited to breakdown of skin (H)  Covered. No drainage present. Continue allyvan foam dressing and change every 3 days    Ulcer coccyx  Family requested to no longer have air mattress as they state it is  distressing to patient.  - Ok to discharge mattress.   - recommend staff reposition patient side to side every 2 hours.     Impaired mobility and activities of daily living  Requires nursing home. Alek lift for transfers and total assist for all ADL's including assist with feeding at times.  - goal of care is to maximize function and move to long term care.      Urinary retention  Back to tcu with ramírez. Ramírez removed on 10/22 and three days of random bladder scans show no retention.   - continue without ramírez catheter. Reassess for need.     Orders:  1. Seroquel 25 mg po bid and 25 mg po bid PRN  2. Discontinue ativan (rather not have two sedating PRN's)  3. Discontinue mattress (per family request)   4. Hospice referral    Total time spent with patient visit was 35 min including patient visit, review of past records and phone call to patient contact. Greater than 50% of total time spent with counseling and coordinating care due to dx.    Electronically signed by:  Katie Calderon NP

## 2018-10-31 NOTE — TELEPHONE ENCOUNTER
Called by nursing staff at [facility] about patient who is confused, yelling out.   Has paranoia, difficult time accepting medications.  Nurse gave patient Haldol 2 mg, but has not helped so far.     By chart review, has had workup for delirium, not clearing over past 5 days.    Nurse asking to send patient in to hospital, but recommend giving second dose of Haldol, continuing to follow unless she becomes a danger to herself.   Follow up with primary team in AM.   Liliana Butcher MD

## 2018-10-31 NOTE — PROGRESS NOTES
"[facility]    Chief Complaint   Patient presents with     Nursing Home Acute       HPI:    Kina Sears is a 89 year old  (6/9/1923), who is being seen today for an episodic care visit at [facility] .  HPI information obtained from: facility chart records, facility staff and patient report.Today's concern is:     Confusion  Chronic combined systolic and diastolic congestive heart failure (H)    ALLERGIES: Sulfa drugs  Past Medical, Surgical, Family and Social History reviewed and updated in EPIC.    REVIEW OF SYSTEMS:  4 point ROS including Respiratory, CV, GI and , other than that noted in the HPI,  is negative    Physical Exam:  BP 96/58  Pulse 70  Temp 96.5  F (35.8  C)  Resp 25  Wt 112 lb 9.6 oz (51.1 kg)  SpO2 93%  BMI 22.74 kg/m2  GENERAL APPEARANCE:  Alert, in no distress    Recent Labs: Facility Labs Reviewed     Assessment/Plan:     Confusion  Chronic combined systolic and diastolic congestive heart failure (H)   Patient was given an additional 2 mg of haldol last evening. Today nursing states she is pleasant and no longer calling out. She continues to have some anxiety. The patient continues to report that she isn't \"getting service\"    Orders:  Continue current plan of care      Electronically signed by  Katie Calderon NP                    "

## 2018-11-09 NOTE — PROGRESS NOTES
[facility]  PRIMARY CARE PROVIDER AND CLINIC:  No Ref-Primary, Physician   Chief Complaint   Patient presents with     Nursing Home Acute     TCU Follow up     [facility] Medical Record Number:  [id number]  Place of Service where encounter took place:  [facility] [[id number]]    HPI:    Kina Sears is a 89 year old  (6/9/1923),admitted to the above facility from  [facility].  Hospital stay 10/14/18 through 10/16/18.  Admitted to this facility for  rehab, medical management and nursing care.  HPI information obtained from: facility chart records, facility staff, patient report, [facility] Epic chart review and Care Everywhere Epic chart review.      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  PAST SURGICAL HISTORY:  has a past surgical history that includes surgical history of - ; angioplasty (7/2006); and Eye surgery.  FAMILY HISTORY: family history includes Arthritis in her father and sister; Breast Cancer in her sister; C.A.D. in her brother and mother; Cancer - colorectal in her sister; Cerebrovascular Disease in her brother; Diabetes in her brother; Hypertension in her mother. There is no history of Prostate Cancer.  SOCIAL HISTORY:  reports that she has never smoked. She has never used smokeless tobacco. She reports that she does not drink alcohol or use illicit drugs.    Current Outpatient Prescriptions   Medication Sig Dispense Refill      ACETAMINOPHEN PO Take 500 mg by mouth 2 times daily as needed for pain       ACETAMINOPHEN PO Take 1,000 mg by mouth 3 times daily       bisacodyl (DULCOLAX) 10 MG Suppository Place 10 mg rectally daily as needed for constipation       HALOPERIDOL PO Take 2 mg by mouth every 6 hours as needed for agitation       MORPHINE SULFATE PO Take 5 mg by mouth every hour as needed for moderate to severe pain       nitroglycerin (NITROSTAT) 0.4 MG SL tablet Place 1 tablet (0.4 mg) under the tongue See Admin Instructions EVERY 5 MIN AS NEEDED, UP TO 3 PER EPISODE 90 tablet 0       ROS:  4 point ROS including Respiratory, CV, GI and , other than that noted in the HPI,  is negative    Exam:  /61  Pulse 116  Temp 98  F (36.7  C)  Resp 24  Wt 109 lb 9.6 oz (49.7 kg)  SpO2 92%  BMI 22.14 kg/m2  GENERAL APPEARANCE:  Alert, oriented to person and place.   RESP:  respiratory effort and palpation of chest normal, no respiratory distress, lungs sounds clear  CV:  Palpation and auscultation of heart done , regular rate and rhythm, no murmur, rub, or gallop, edema none  ABDOMEN:  normal bowel sounds, soft, nontender, no hepatosplenomegaly or other masses  M/S:  Gait and station observed in wheelchair, uses so for transfers,   SKIN:  Inspection and palpation of skin and subcutaneous tissue at - bottom not observed. Dependent rubor present of left LE  PSYCH:  insight and judgement, memory forgetful, affect and mood normal     Lab/Diagnostic data: Facility labs reviewed.    ASSESSMENT/PLAN:  Chronic combined systolic and diastolic congestive heart failure (H)  Did not take any food today or yesterday. Small sips of water only. No medications given except comfort meds.  - continue comfort medications only.     Generalized pain / confusion  10/19 New onset of confusion noted yesterday after patient received 3 doses of her tramadol. She was delirous and stated people were trying to harm her. She continues to be upset that  someone gave her that medication after she told them not to. Tramadol has been on hold since yesterday. She relays that she is having some pain in her bottom but it improves after she gets off of it. She states she refused to walk in therapy.   - discontinue tramadol  - continue scheduled tylenol 1,000 mg po TID and additional 500 mg po BID PRN ok to go up to 4 g tyelnol   - labs show no reason for delirium  10/29 spoke with son on 10/26 regarding his concerns about patient delirium. Labs ordered and comfort care approach wanted. Ativan added. Patient had a difficult weakend and on Sunday was seen hitting her head against the table. seroquel was ordered and not effective.   11/9 Haldol was effective but now hospice nurse states she has periods of agitation that are not calmed by the haldol. She will check with hospice provider on appropriate transition to prochlorperazine       Ulcer of left lower extremity, limited to breakdown of skin (H)  Covered. No drainage present. Continue allyvan foam dressing and change every 3 days    Ulcer coccyx  Family requested to no longer have air mattress as they state it is distressing to patient. Coccyx and bottom improved.  - recommend staff reposition patient side to side every 2 hours.     Impaired mobility and activities of daily living  Requires nursing home.   - goal of care is for comfort     Urinary retention  No signs of urinary retention.     Orders:  1. Discharge Flonase, imodium, po tylenol, lasix, metoprolol, omeprazole  2.  mg tylenol every 4 hours prn for fever > 99.5 or pain   Discussed with hospice nurse who will change haldol to compazine.       Total time spent with patient visit was 35 min including patient visit, review of past records and phone call to patient contact. Greater than 50% of total time spent with counseling and coordinating care due to dx.    Electronically signed by:  Katie Calderon NP

## 2018-11-13 ENCOUNTER — TELEPHONE (OUTPATIENT)
Dept: FAMILY MEDICINE | Facility: OTHER | Age: 83
End: 2018-11-13

## 2018-11-13 NOTE — PROGRESS NOTES
[facility] now requests orders and shares plan of care/discharge summaries for some patients through [employer].  Please REPLY TO THIS MESSAGE OR ROUTE BACK TO THE AUTHOR in order to give authorization for orders when needed.  This is considered a verbal order, you will still receive a faxed copy of orders for signature.  Thank you for your assistance in improving collaboration for our patients.      Patient  at 1335 on 18 at [facility].  Death was peaceful and family present at bedside.     Kathi Mcbride RN   [facility]  [phone]